# Patient Record
Sex: FEMALE | Race: WHITE | NOT HISPANIC OR LATINO | URBAN - METROPOLITAN AREA
[De-identification: names, ages, dates, MRNs, and addresses within clinical notes are randomized per-mention and may not be internally consistent; named-entity substitution may affect disease eponyms.]

---

## 2018-12-19 ENCOUNTER — INPATIENT (INPATIENT)
Facility: HOSPITAL | Age: 78
LOS: 7 days | DRG: 216 | End: 2018-12-27
Attending: THORACIC SURGERY (CARDIOTHORACIC VASCULAR SURGERY) | Admitting: THORACIC SURGERY (CARDIOTHORACIC VASCULAR SURGERY)
Payer: MEDICARE

## 2018-12-19 VITALS
DIASTOLIC BLOOD PRESSURE: 61 MMHG | TEMPERATURE: 100 F | HEART RATE: 110 BPM | RESPIRATION RATE: 16 BRPM | SYSTOLIC BLOOD PRESSURE: 83 MMHG

## 2018-12-19 DIAGNOSIS — J18.9 PNEUMONIA, UNSPECIFIED ORGANISM: ICD-10-CM

## 2018-12-19 DIAGNOSIS — K72.00 ACUTE AND SUBACUTE HEPATIC FAILURE WITHOUT COMA: ICD-10-CM

## 2018-12-19 DIAGNOSIS — E87.2 ACIDOSIS: ICD-10-CM

## 2018-12-19 DIAGNOSIS — J90 PLEURAL EFFUSION, NOT ELSEWHERE CLASSIFIED: ICD-10-CM

## 2018-12-19 DIAGNOSIS — J96.00 ACUTE RESPIRATORY FAILURE, UNSPECIFIED WHETHER WITH HYPOXIA OR HYPERCAPNIA: ICD-10-CM

## 2018-12-19 DIAGNOSIS — R04.2 HEMOPTYSIS: ICD-10-CM

## 2018-12-19 DIAGNOSIS — I51.1 RUPTURE OF CHORDAE TENDINEAE, NOT ELSEWHERE CLASSIFIED: ICD-10-CM

## 2018-12-19 DIAGNOSIS — N17.0 ACUTE KIDNEY FAILURE WITH TUBULAR NECROSIS: ICD-10-CM

## 2018-12-19 DIAGNOSIS — B44.9 ASPERGILLOSIS, UNSPECIFIED: ICD-10-CM

## 2018-12-19 DIAGNOSIS — I23.0 HEMOPERICARDIUM AS CURRENT COMPLICATION FOLLOWING ACUTE MYOCARDIAL INFARCTION: ICD-10-CM

## 2018-12-19 DIAGNOSIS — I23.5: ICD-10-CM

## 2018-12-19 LAB
ALBUMIN SERPL ELPH-MCNC: 2.7 G/DL — LOW (ref 3.3–5)
ALP SERPL-CCNC: 80 U/L — SIGNIFICANT CHANGE UP (ref 40–120)
ALT FLD-CCNC: 33 U/L — SIGNIFICANT CHANGE UP (ref 10–45)
ANION GAP SERPL CALC-SCNC: 15 MMOL/L — SIGNIFICANT CHANGE UP (ref 5–17)
APTT BLD: 32.1 SEC — SIGNIFICANT CHANGE UP (ref 27.5–36.3)
AST SERPL-CCNC: 78 U/L — HIGH (ref 10–40)
BASOPHILS NFR BLD AUTO: 0.1 % — SIGNIFICANT CHANGE UP (ref 0–2)
BILIRUB SERPL-MCNC: 0.9 MG/DL — SIGNIFICANT CHANGE UP (ref 0.2–1.2)
BLD GP AB SCN SERPL QL: NEGATIVE — SIGNIFICANT CHANGE UP
BLD GP AB SCN SERPL QL: NEGATIVE — SIGNIFICANT CHANGE UP
BUN SERPL-MCNC: 24 MG/DL — HIGH (ref 7–23)
CALCIUM SERPL-MCNC: 7.5 MG/DL — LOW (ref 8.4–10.5)
CHLORIDE SERPL-SCNC: 103 MMOL/L — SIGNIFICANT CHANGE UP (ref 96–108)
CHOLEST SERPL-MCNC: 106 MG/DL — SIGNIFICANT CHANGE UP (ref 10–199)
CK MB CFR SERPL CALC: 12.2 NG/ML — HIGH (ref 0–6.7)
CK MB CFR SERPL CALC: 17.8 NG/ML — HIGH (ref 0–6.7)
CK SERPL-CCNC: 559 U/L — HIGH (ref 25–170)
CK SERPL-CCNC: 724 U/L — HIGH (ref 25–170)
CO2 SERPL-SCNC: 20 MMOL/L — LOW (ref 22–31)
CREAT SERPL-MCNC: 1.2 MG/DL — SIGNIFICANT CHANGE UP (ref 0.5–1.3)
EOSINOPHIL NFR BLD AUTO: 0.1 % — SIGNIFICANT CHANGE UP (ref 0–6)
GLUCOSE SERPL-MCNC: 115 MG/DL — HIGH (ref 70–99)
HBA1C BLD-MCNC: 5 % — SIGNIFICANT CHANGE UP (ref 4–5.6)
HCT VFR BLD CALC: 31.2 % — LOW (ref 34.5–45)
HDLC SERPL-MCNC: 40 MG/DL — LOW
HGB BLD-MCNC: 10.2 G/DL — LOW (ref 11.5–15.5)
INR BLD: 1.32 — HIGH (ref 0.88–1.16)
LIPID PNL WITH DIRECT LDL SERPL: 54 MG/DL — SIGNIFICANT CHANGE UP
LYMPHOCYTES # BLD AUTO: 6.4 % — LOW (ref 13–44)
MAGNESIUM SERPL-MCNC: 2 MG/DL — SIGNIFICANT CHANGE UP (ref 1.6–2.6)
MCHC RBC-ENTMCNC: 31.5 PG — SIGNIFICANT CHANGE UP (ref 27–34)
MCHC RBC-ENTMCNC: 32.7 G/DL — SIGNIFICANT CHANGE UP (ref 32–36)
MCV RBC AUTO: 96.3 FL — SIGNIFICANT CHANGE UP (ref 80–100)
MONOCYTES NFR BLD AUTO: 13 % — SIGNIFICANT CHANGE UP (ref 2–14)
NEUTROPHILS NFR BLD AUTO: 80.4 % — HIGH (ref 43–77)
PLATELET # BLD AUTO: 143 K/UL — LOW (ref 150–400)
POTASSIUM SERPL-MCNC: 3.7 MMOL/L — SIGNIFICANT CHANGE UP (ref 3.5–5.3)
POTASSIUM SERPL-SCNC: 3.7 MMOL/L — SIGNIFICANT CHANGE UP (ref 3.5–5.3)
PROT SERPL-MCNC: 5.1 G/DL — LOW (ref 6–8.3)
PROTHROM AB SERPL-ACNC: 15.1 SEC — HIGH (ref 10–12.9)
RBC # BLD: 3.24 M/UL — LOW (ref 3.8–5.2)
RBC # FLD: 13.2 % — SIGNIFICANT CHANGE UP (ref 10.3–16.9)
RH IG SCN BLD-IMP: POSITIVE — SIGNIFICANT CHANGE UP
RH IG SCN BLD-IMP: POSITIVE — SIGNIFICANT CHANGE UP
SODIUM SERPL-SCNC: 138 MMOL/L — SIGNIFICANT CHANGE UP (ref 135–145)
TOTAL CHOLESTEROL/HDL RATIO MEASUREMENT: 2.6 RATIO — LOW (ref 3.3–7.1)
TRIGL SERPL-MCNC: 58 MG/DL — SIGNIFICANT CHANGE UP (ref 10–149)
TROPONIN T SERPL-MCNC: 2.42 NG/ML — CRITICAL HIGH (ref 0–0.01)
TROPONIN T SERPL-MCNC: 2.67 NG/ML — CRITICAL HIGH (ref 0–0.01)
TSH SERPL-MCNC: 0.56 UIU/ML — SIGNIFICANT CHANGE UP (ref 0.35–4.94)
WBC # BLD: 14.4 K/UL — HIGH (ref 3.8–10.5)
WBC # FLD AUTO: 14.4 K/UL — HIGH (ref 3.8–10.5)

## 2018-12-19 PROCEDURE — 93010 ELECTROCARDIOGRAM REPORT: CPT

## 2018-12-19 PROCEDURE — 99291 CRITICAL CARE FIRST HOUR: CPT

## 2018-12-19 PROCEDURE — 93306 TTE W/DOPPLER COMPLETE: CPT | Mod: 26

## 2018-12-19 PROCEDURE — 71045 X-RAY EXAM CHEST 1 VIEW: CPT | Mod: 26

## 2018-12-19 PROCEDURE — 36556 INSERT NON-TUNNEL CV CATH: CPT

## 2018-12-19 PROCEDURE — 99222 1ST HOSP IP/OBS MODERATE 55: CPT | Mod: GC

## 2018-12-19 PROCEDURE — 99223 1ST HOSP IP/OBS HIGH 75: CPT | Mod: 24

## 2018-12-19 RX ORDER — BUDESONIDE AND FORMOTEROL FUMARATE DIHYDRATE 160; 4.5 UG/1; UG/1
2 AEROSOL RESPIRATORY (INHALATION)
Qty: 0 | Refills: 0 | Status: DISCONTINUED | OUTPATIENT
Start: 2018-12-19 | End: 2018-12-24

## 2018-12-19 RX ORDER — HEPARIN SODIUM 5000 [USP'U]/ML
1000 INJECTION INTRAVENOUS; SUBCUTANEOUS
Qty: 25000 | Refills: 0 | Status: DISCONTINUED | OUTPATIENT
Start: 2018-12-19 | End: 2018-12-20

## 2018-12-19 RX ORDER — SODIUM CHLORIDE 9 MG/ML
500 INJECTION INTRAMUSCULAR; INTRAVENOUS; SUBCUTANEOUS ONCE
Qty: 0 | Refills: 0 | Status: COMPLETED | OUTPATIENT
Start: 2018-12-19 | End: 2018-12-19

## 2018-12-19 RX ORDER — ONDANSETRON 8 MG/1
1 TABLET, FILM COATED ORAL
Qty: 0 | Refills: 0 | COMMUNITY

## 2018-12-19 RX ORDER — VANCOMYCIN HCL 1 G
1000 VIAL (EA) INTRAVENOUS EVERY 24 HOURS
Qty: 0 | Refills: 0 | Status: DISCONTINUED | OUTPATIENT
Start: 2018-12-19 | End: 2018-12-27

## 2018-12-19 RX ORDER — FLECAINIDE ACETATE 50 MG
1 TABLET ORAL
Qty: 0 | Refills: 0 | COMMUNITY

## 2018-12-19 RX ORDER — MONTELUKAST 4 MG/1
1 TABLET, CHEWABLE ORAL
Qty: 0 | Refills: 0 | COMMUNITY

## 2018-12-19 RX ORDER — LOSARTAN POTASSIUM 100 MG/1
1 TABLET, FILM COATED ORAL
Qty: 0 | Refills: 0 | COMMUNITY

## 2018-12-19 RX ORDER — SODIUM CHLORIDE 9 MG/ML
1000 INJECTION INTRAMUSCULAR; INTRAVENOUS; SUBCUTANEOUS
Qty: 0 | Refills: 0 | Status: DISCONTINUED | OUTPATIENT
Start: 2018-12-19 | End: 2018-12-20

## 2018-12-19 RX ORDER — PANTOPRAZOLE SODIUM 20 MG/1
1 TABLET, DELAYED RELEASE ORAL
Qty: 0 | Refills: 0 | COMMUNITY

## 2018-12-19 RX ORDER — CLOPIDOGREL BISULFATE 75 MG/1
75 TABLET, FILM COATED ORAL DAILY
Qty: 0 | Refills: 0 | Status: DISCONTINUED | OUTPATIENT
Start: 2018-12-20 | End: 2018-12-20

## 2018-12-19 RX ORDER — PIPERACILLIN AND TAZOBACTAM 4; .5 G/20ML; G/20ML
2.25 INJECTION, POWDER, LYOPHILIZED, FOR SOLUTION INTRAVENOUS EVERY 6 HOURS
Qty: 0 | Refills: 0 | Status: DISCONTINUED | OUTPATIENT
Start: 2018-12-19 | End: 2018-12-20

## 2018-12-19 RX ORDER — MONTELUKAST 4 MG/1
10 TABLET, CHEWABLE ORAL DAILY
Qty: 0 | Refills: 0 | Status: DISCONTINUED | OUTPATIENT
Start: 2018-12-19 | End: 2018-12-22

## 2018-12-19 RX ORDER — IPRATROPIUM/ALBUTEROL SULFATE 18-103MCG
3 AEROSOL WITH ADAPTER (GRAM) INHALATION EVERY 6 HOURS
Qty: 0 | Refills: 0 | Status: DISCONTINUED | OUTPATIENT
Start: 2018-12-19 | End: 2018-12-19

## 2018-12-19 RX ORDER — ATORVASTATIN CALCIUM 80 MG/1
80 TABLET, FILM COATED ORAL AT BEDTIME
Qty: 0 | Refills: 0 | Status: DISCONTINUED | OUTPATIENT
Start: 2018-12-19 | End: 2018-12-25

## 2018-12-19 RX ORDER — VORICONAZOLE 10 MG/ML
1 INJECTION, POWDER, LYOPHILIZED, FOR SOLUTION INTRAVENOUS
Qty: 0 | Refills: 0 | COMMUNITY

## 2018-12-19 RX ORDER — NEBIVOLOL HYDROCHLORIDE 5 MG/1
1 TABLET ORAL
Qty: 0 | Refills: 0 | COMMUNITY

## 2018-12-19 RX ORDER — SIMVASTATIN 20 MG/1
1 TABLET, FILM COATED ORAL
Qty: 0 | Refills: 0 | COMMUNITY

## 2018-12-19 RX ORDER — ASPIRIN/CALCIUM CARB/MAGNESIUM 324 MG
81 TABLET ORAL DAILY
Qty: 0 | Refills: 0 | Status: DISCONTINUED | OUTPATIENT
Start: 2018-12-20 | End: 2018-12-20

## 2018-12-19 RX ORDER — POTASSIUM CHLORIDE 20 MEQ
40 PACKET (EA) ORAL ONCE
Qty: 0 | Refills: 0 | Status: COMPLETED | OUTPATIENT
Start: 2018-12-19 | End: 2018-12-19

## 2018-12-19 RX ORDER — TIOTROPIUM BROMIDE 18 UG/1
1 CAPSULE ORAL; RESPIRATORY (INHALATION) DAILY
Qty: 0 | Refills: 0 | Status: DISCONTINUED | OUTPATIENT
Start: 2018-12-19 | End: 2018-12-19

## 2018-12-19 RX ORDER — PANTOPRAZOLE SODIUM 20 MG/1
40 TABLET, DELAYED RELEASE ORAL
Qty: 0 | Refills: 0 | Status: DISCONTINUED | OUTPATIENT
Start: 2018-12-19 | End: 2018-12-20

## 2018-12-19 RX ORDER — IPRATROPIUM/ALBUTEROL SULFATE 18-103MCG
3 AEROSOL WITH ADAPTER (GRAM) INHALATION EVERY 6 HOURS
Qty: 0 | Refills: 0 | Status: DISCONTINUED | OUTPATIENT
Start: 2018-12-19 | End: 2018-12-27

## 2018-12-19 RX ORDER — ALBUTEROL 90 UG/1
1 AEROSOL, METERED ORAL EVERY 4 HOURS
Qty: 0 | Refills: 0 | Status: DISCONTINUED | OUTPATIENT
Start: 2018-12-19 | End: 2018-12-19

## 2018-12-19 RX ORDER — ALENDRONATE SODIUM 70 MG/1
1 TABLET ORAL
Qty: 0 | Refills: 0 | COMMUNITY

## 2018-12-19 RX ORDER — FLUTICASONE FUROATE AND VILANTEROL TRIFENATATE 100; 25 UG/1; UG/1
1 POWDER RESPIRATORY (INHALATION)
Qty: 0 | Refills: 0 | COMMUNITY

## 2018-12-19 RX ADMIN — Medication 40 MILLIEQUIVALENT(S): at 17:56

## 2018-12-19 RX ADMIN — HEPARIN SODIUM 10 UNIT(S)/HR: 5000 INJECTION INTRAVENOUS; SUBCUTANEOUS at 22:09

## 2018-12-19 RX ADMIN — SODIUM CHLORIDE 2000 MILLILITER(S): 9 INJECTION INTRAMUSCULAR; INTRAVENOUS; SUBCUTANEOUS at 16:05

## 2018-12-19 RX ADMIN — Medication 250 MILLIGRAM(S): at 21:42

## 2018-12-19 RX ADMIN — SODIUM CHLORIDE 125 MILLILITER(S): 9 INJECTION INTRAMUSCULAR; INTRAVENOUS; SUBCUTANEOUS at 17:16

## 2018-12-19 RX ADMIN — ATORVASTATIN CALCIUM 80 MILLIGRAM(S): 80 TABLET, FILM COATED ORAL at 21:42

## 2018-12-19 RX ADMIN — PIPERACILLIN AND TAZOBACTAM 200 GRAM(S): 4; .5 INJECTION, POWDER, LYOPHILIZED, FOR SOLUTION INTRAVENOUS at 21:42

## 2018-12-19 NOTE — H&P ADULT - ASSESSMENT
77 yo F w/ HTN, asthma, necrotizing PNA s/p biopsy showing aspergilloma transferred from [place] to [facility] CCU for NSTEMI for     cardiac cath.    Cardiovascular    #NSTEMI  pt had no CP, but had fever, nausea, vomiting, trops I 50, no ekg changes  -consider cardiac cath  -ASA, anti-platelets  -lipitor  -BB  -cbc, bmp, trops, tsh, lipid panel, a1c    #afib  paroxysmal, new onset, chadsvasc score 4  -tele monitoring  -c/w hep gtt    #HTN  -c/w     #HLD  -c/w statin    Pulmonary    #necrotizing PNA  s/p BAL, bx grew aspergillis, originally on amphoteracin c/b HOLA, swithced to voriconazole w/ imrpovement in cr  -c/w voriconazole  -ID consult  -pulm consult Dr. Saucedo  -consider vats  -hikadin for cough (to suppress cough)    #asthma  -duonebs q4 prn    #FENC/ misc  F- PO hydration  E- replete as needed  N- dash diet  C- FC  DVt ppx: hep drip  A as tolerated  dispo: CCU    #JULIO CESAR  Dr. Madeline Smith- [phone] 79 yo F w/ HTN, asthma, necrotizing PNA s/p biopsy showing aspergilloma transferred from [place] to [facility] CCU for NSTEMI for     cardiac cath.    Cardiovascular    #NSTEMI  pt had no CP, but had fever, nausea, vomiting, trops I 50, no ekg changes, transferred to [facility] for NSTEMI  -cardiac cath 12/20  -c/w ASA  -c/w plavix  -c/w lipitor 80  -BB  -cbc, bmp, trops, tsh, lipid panel, a1c    #afib  paroxysmal, new onset, chadsvasc score 4  -tele monitoring  -c/w hep gtt    #HTN  hx of hypertension, now norm/hypo-tensive  -hold anti-hypertensives in setting of normo-hypotension    #HLD  -c/w statin as above    Pulmonary    #necrotizing PNA  s/p BAL, bx grew aspergillis, originally on amphoteracin c/b HOLA, swithced to voriconazole w/ imrpovement in cr  -c/w voriconazole  -ID consult  -pulm consult Dr. Saucedo, f/u recs  -consider vats  -hikadin for cough (to suppress cough)    #asthma  -duonebs q4 prn    #FENC/ misc  F- PO hydration  E- replete as needed  N- dash diet  C- FC  DVt ppx: hep drip  A as tolerated  dispo: CCU    #JULIO CESAR  Dr. Madeline Smith- [phone] 79 yo F w/ HTN, asthma, necrotizing PNA s/p biopsy showing aspergilloma transferred from [place] to [facility] CCU for NSTEMI for     cardiac cath.    Cardiovascular    #NSTEMI  pt had no CP, but had fever, nausea, vomiting, trops I 50, no ekg changes, transferred to [facility] for NSTEMI  -cardiac cath 12/20  -c/w ASA  -c/w plavix  -c/w lipitor 80  -BB  -cbc, bmp, trops, tsh, lipid panel, a1c    #afib  paroxysmal, new onset, chadsvasc score 4  -tele monitoring  -c/w hep gtt    #HTN  hx of hypertension, now norm/hypo-tensive  -hold anti-hypertensives in setting of normo-hypotension    #HLD  -c/w statin as above    Pulmonary    #necrotizing PNA  s/p BAL, bx grew aspergillis, originally on amphoteracin c/b HOLA, swithced to voriconazole w/ imrpovement in cr  -c/w voriconazole  -ID consult  -fungitell, aspergillis galactomannan antigen  -pulm consult Dr. Saucedo, f/u recs  -consider vats  -hikadin for cough (to suppress cough)    #asthma  -duonebs q4 prn    #FENC/ misc  F- PO hydration  E- replete as needed  N- dash diet  C- FC  DVt ppx: hep drip  A as tolerated  dispo: CCU    #JULIO CESAR  Dr. Madeline Smith- [phone]  Pulmonologist- Dr. Escobar  Cardiologist- Dr. Taylor 77 yo F w/ HTN, asthma, necrotizing PNA s/p biopsy showing aspergilloma transferred from [place] to [facility] CCU for NSTEMI for     cardiac cath.    Cardiovascular    #NSTEMI  pt had no CP, but had fever, nausea, vomiting, trops I 50, no ekg changes, transferred to [facility] for NSTEMI  -cardiac cath 12/20  -c/w ASA  -c/w plavix  -c/w lipitor 80  -hold BB in setting of sepsis  -cbc, bmp, trops, tsh, lipid panel, a1c    #afib  paroxysmal, new onset, chadsvasc score 4  -tele monitoring  -c/w hep gtt    #HTN  hx of hypertension, now norm/hypo-tensive  -hold anti-hypertensives in setting of normo-hypotension    #HLD  -c/w statin as above    ID    #sepsis  POA, WBC elevated and tachycardia, source is likely lung infection. BCx ngtd at [place]  -ID consult  -hold BB, and losartan in setting of sepsis  -monitor CBC  -f/u Bcx from [facility] ([facility])    Pulmonary    #necrotizing PNA  s/p BAL, bx grew aspergillis, originally on amphoteracin c/b HOLA, swithced to voriconazole w/ imrpovement in cr  -ID consulted  -fungitell, aspergillis galactomannan antigen  -pulm consult Dr. Saucedo, f/u recs  -hycodan PRN for cough    #asthma  on home albuterol and brep-ellipta   -duonebs q4 prn  -symbicort 160 BID  (interchange for home breo-ellipta)    #FENC/ misc  F- IVF bolus now, then PO hydration  E- replete as needed  N- dash diet, npo at 12  C- FC  DVt ppx: hep drip  A as tolerated  dispo: CCU    #JULIO CESAR  Dr. Madeline Smith- [phone]  Pulmonologist- Dr. Escobar  Cardiologist- Dr. Taylor 77 yo F w/ HTN, asthma, necrotizing PNA s/p biopsy showing aspergilloma transferred from [place] to [facility] CCU for NSTEMI for     cardiac cath.    Cardiovascular    #NSTEMI  pt had no CP, but had fever, nausea, vomiting, trops I 50, no ekg changes, transferred to [facility] for NSTEMI  -cardiac cath 12/20  -npo after 12am  -c/w ASA  -c/w plavix  -c/w lipitor 80  -hold BB in setting of sepsis  -trops, tsh, lipid panel, a1c    #afib  paroxysmal, new onset, chadsvasc score 4  -tele monitoring  -c/w hep gtt    #HTN  hx of hypertension, now norm/hypo-tensive  -hold anti-hypertensives in setting of normo-hypotension    #HLD  -c/w statin as above    ID    #sepsis  POA, WBC elevated and tachycardia, source is likely lung infection. BCx ngtd at [place]. UA neg/ no urinary sx. lactate 1.6 at [place]  -ID consulted, f.u recs  -hold BB, and losartan in setting of sepsis and hypo-normotension  -monitor CBC, fever  -f/u Bcx from [facility] ([facility])    Pulmonary    #necrotizing PNA  CXR 10/31 showing RUL large opacity/consolidation. 12/13 CT chest showing focal consolidation in the right upper/middle lobes with adjacent tree-in-bud nodularity as well as mild bronchiectatic changes, suspicious for    a low-grade necrotizing pneumonia, a few mildly prominent low density lymph nodes measuring up to 7 mm. pt s/p Bronchoscopy w/ BAL and biopsy 10/31 neg for AFB. Repeat bronch at [facility] 1 week ago, unknown results at this time. pt was treated for aspergillosis at [place] w/ amphotericin c/b HOLA, switched to voriconazole w/ improvement in cr.  -call for collateral in am about repeat BAL results  -ID consulted  -fungitell, aspergillis galactomannan antigen  -pulm consult Dr. Saucedo, f/u recs  -hycodan PRN for cough  -no abx at this time as no culture/BAL data and aspergilloma not treated w/ po meds but with resection per ID    #asthma  on home albuterol and brep-ellipta   -duonebs q4 prn  -symbicort 160 BID  (interchange for home breo-ellipta)    #FENC/ misc  F- IVF bolus now, then PO hydration  E- replete as needed  N- dash diet, npo at 12  C- FC  DVt ppx: hep drip  A as tolerated  dispo: CCU    #JULIO CESAR  Dr. Madeline Smith- [phone]  Pulmonologist- Dr. Escobar  Cardiologist- Dr. Taylor 77 yo F w/ HTN, asthma, necrotizing PNA s/p biopsy showing aspergilloma transferred from [place] to [facility] CCU for NSTEMI for     cardiac cath.    Cardiovascular    #NSTEMI  pt had no CP, but had fever, nausea, vomiting, trops I 50, no ekg changes, transferred to [facility] for NSTEMI. trops T at [facility] ~2. a1c 5. ldl 54.  -cardiac cath 12/20  -npo after 12am  -c/w ASA  -c/w plavix  -c/w lipitor 80  -hold BB in setting of sepsis  - tsh  -trend trops to peak    #afib  paroxysmal, new onset, chadsvasc score 4  -tele monitoring  -c/w hep gtt    #HTN  hx of hypertension, now norm/hypo-tensive  -hold anti-hypertensives in setting of normo-hypotension    #HLD  -c/w statin as above    ID    #sepsis  POA, WBC elevated and tachycardia, source is likely lung infection. BCx ngtd at [place]. UA neg/ no urinary sx. lactate 1.6 at [place]  -ID consulted, f.u recs  -hold BB, and losartan in setting of sepsis and hypo-normotension  -monitor CBC, fever  -f/u Bcx from [facility] ([facility]  -see below under nec pna    #leukocytosis  likely 2/2 sepsis from invasive aspergillosis vs HCAP vs reactive from NSTEMI  -monitor CBC    Pulmonary    #necrotizing PNA  CXR 10/31 showing RUL large opacity/consolidation. 12/13 CT chest showing focal consolidation in the right upper/middle lobes with adjacent tree-in-bud nodularity as well as mild bronchiectatic changes, suspicious for    a low-grade necrotizing pneumonia, a few mildly prominent low density lymph nodes measuring up to 7 mm. pt s/p Bronchoscopy w/ BAL and biopsy 10/31 neg for AFB. Repeat bronch at [facility] 1 week ago, showed aspergillis, given hemoptysis pt has invasive aspergillosis. pt was treated for aspergillosis at [place] w/ amphotericin b (while flecanide cleared system) c/b HOLA, switched to voriconazole w/ improvement in cr.  -call for collateral in am about repeat BAL results  -ID consulted  -f/u fungitell, aspergillis galactomannan antigen  -pulm consult Dr. Saucedo, f/u recs  -hycodan PRN for cough  -empiric tx w/ vanc zosyn per pulm for HCAP  -hold flecainide as interacts w/ voriconazole    #asthma  on home albuterol and brep-ellipta   -duonebs q4 prn  -symbicort 160 BID  (interchange for home breo-ellipta)    Heme    #anemia  normocytic, no known baseline  -monitor CBC  -type and Screen    #thrombocytopenia  -monitor CBC    Other    #FENC/ misc  F- IVF bolus now, then PO hydration  E- replete as needed  N- dash diet, npo at 12  C- FC  DVt ppx: hep drip  A as tolerated  dispo: CCU    #JULIO CESAR  Dr. Madeline Smith- [phone]  Pulmonologist- Dr. Escobra  Cardiologist- Dr. Taylor 79 yo F w/ HTN, asthma, necrotizing PNA s/p biopsy showing aspergilloma transferred from [place] to [facility] CCU for NSTEMI for     cardiac cath.    Cardiovascular    #NSTEMI  pt had no CP, but had fever, nausea, vomiting, trops I 50, no ekg changes, transferred to [facility] for NSTEMI. trops T at [facility] ~2. a1c 5. ldl 54.  -cardiac cath 12/20  -npo after 12am  -c/w ASA  -c/w plavix  -c/w lipitor 80  -hold BB in setting of sepsis  - tsh  -trend trops to peak    #afib  paroxysmal, new onset, chadsvasc score 4  -tele monitoring  -c/w hep gtt    #HTN  hx of hypertension, now norm/hypo-tensive  -hold anti-hypertensives in setting of normo-hypotension    #HLD  -c/w statin as above    ID    #sepsis  POA, WBC elevated and tachycardia, source is likely lung infection. BCx ngtd at [place]. UA neg/ no urinary sx. lactate 1.6 at [place]  -ID consulted, f.u recs  -hold BB, and losartan in setting of sepsis and hypo-normotension  -monitor CBC, fever  -f/u Bcx from [facility] ([facility])  -see below under nec pna    #leukocytosis  likely 2/2 sepsis from invasive aspergillosis vs HCAP vs reactive from NSTEMI  -monitor CBC    Pulmonary    #necrotizing PNA  CXR 10/31 showing RUL large opacity/consolidation. 12/13 CT chest showing focal consolidation in the right upper/middle lobes with adjacent tree-in-bud nodularity as well as mild bronchiectatic changes, suspicious for    a low-grade necrotizing pneumonia, a few mildly prominent low density lymph nodes measuring up to 7 mm. pt s/p Bronchoscopy w/ BAL and biopsy 10/31 neg for AFB. Repeat bronch at [facility] 1 week ago, showed aspergillis, given hemoptysis pt has invasive aspergillosis. pt was treated for aspergillosis at [place] w/ amphotericin b (while flecanide cleared system) c/b HOLA, switched to voriconazole w/ improvement in cr.  -call for collateral in am about repeat BAL results  -ID consulted  -f/u fungitell, aspergillis galactomannan antigen  -pulm consult Dr. Saucedo, f/u recs  -hycodan PRN for cough  -empiric tx w/ vanc zosyn per pulm for HCAP  -hold flecainide as interacts w/ voriconazole    #asthma  on home albuterol and brep-ellipta   -duonebs q4 prn  -symbicort 160 BID  (interchange for home breo-ellipta)    Heme    #anemia  normocytic, 14 on admission to [place], pt s/p hemoptysis now resolved  -monitor CBC  -monitor for s/s bleed  -active type and Screen    #thrombocytopenia  baseline ~200 at [place] admission now 143, likely from acute illness causing myelosuppresion  -monitor CBC    Other    #FENC/ misc  F- IVF bolus now, then PO hydration  E- replete as needed  N- dash diet, npo at 12  C- FC  DVt ppx: hep drip  A as tolerated  dispo: CCU    #JULIO CESAR  Dr. Madeline Smith- [phone]  Pulmonologist- Dr. Escobar  Cardiologist- Dr. Taylor 79 yo F w/ HTN, asthma, HLD, GERD, SVT, MVP, osteoporosis, lung nodule (RUL, for 2 years), necrotizing PNA s/p biopsy showing aspergillosis transferred from [facility] w/ NSTEMI for cardiac cath.     Cardiovascular    #NSTEMI  pt had no CP, but had fever, nausea, vomiting, trops I 50, no ekg changes, transferred to [facility] for NSTEMI. trops T at [facility] ~2. a1c 5. ldl 54.  -cardiac cath 12/20  -npo after 12am  -c/w ASA  -c/w plavix  -c/w lipitor 80  -hold BB in setting of sepsis  - tsh  -trend trops to peak    #afib  paroxysmal, new onset, chadsvasc score 4  -tele monitoring  -c/w hep gtt    #HTN  hx of hypertension, now norm/hypo-tensive  -hold anti-hypertensives in setting of normo-hypotension    #HLD  -c/w statin as above    ID    #sepsis  POA, WBC elevated and tachycardia, source is likely lung infection. BCx ngtd, lactate 1.6 at [facility]. UA neg/ no urinary sx.   -ID consulted, f.u recs  -hold BB, and losartan in setting of sepsis and hypo-normotension  -monitor CBC, fever  -f/u Bcx from [facility] ([facility])  -see below under nec pna    #leukocytosis  likely 2/2 sepsis from invasive aspergillosis vs HCAP vs reactive from NSTEMI  -monitor CBC    Pulmonary    #necrotizing PNA  CXR 10/31 showing RUL large opacity/consolidation. 12/13 CT chest showing focal consolidation in the right upper/middle lobes with adjacent tree-in-bud nodularity as well as mild bronchiectatic changes, suspicious for    a low-grade necrotizing pneumonia, a few mildly prominent low density lymph nodes measuring up to 7 mm. pt s/p Bronchoscopy w/ BAL and biopsy 10/31 neg for AFB. Repeat bronch at [facility] 1 week ago, showed aspergillis, given hemoptysis pt has invasive aspergillosis. pt was treated for aspergillosis [facility] w/ amphotericin b (while flecanide cleared system) c/b HOLA, switched to voriconazole w/ improvement in cr.  -call for collateral in am about repeat BAL results  -ID consulted  -f/u fungitell, aspergillis galactomannan antigen  -pulm consult Dr. Saucedo, f/u recs  -hycodan PRN for cough  -empiric tx w/ vanc zosyn per pulm for HCAP  -hold flecainide as interacts w/ voriconazole    #asthma  on home albuterol and brep-ellipta   -duonebs q4 prn  -symbicort 160 BID  (interchange for home breo-ellipta)    Heme    #anemia  normocytic, 14 on admission to [facility], pt s/p hemoptysis now resolved  -monitor CBC  -monitor for s/s bleed  -active type and Screen    #thrombocytopenia  baseline ~200 at [facility] admission now 143, likely from acute illness causing myelosuppression  -monitor CBC    Other    #FENC/ misc  F- IVF bolus now, then PO hydration  E- replete as needed  N- dash diet, npo at 12  C- FC  DVt ppx: hep drip  A as tolerated  dispo: CCU    #JULIO CESAR  Dr. Madeline Smith- [phone]  Pulmonologist- Dr. Escobar  Cardiologist- Dr. Taylor 79 yo F w/ HTN, asthma, HLD, GERD, SVT, MVP, osteoporosis, lung nodule (RUL, for 2 years), necrotizing PNA s/p biopsy showing aspergillosis transferred from [facility] w/ NSTEMI for cardiac cath.     Cardiovascular    #NSTEMI  pt had no CP, but had fever, nausea, vomiting, trops I 50, no ekg changes, transferred to [facility] for NSTEMI. loaded w/ asa and plavix,  trops T at [facility] ~2. a1c 5. ldl 54.  -cardiac cath 12/20  -npo after 12am  -c/w ASA  -c/w plavix  -c/w lipitor 80  -hold BB in setting of sepsis  -f/u tsh  -trend trops to peak  -hep gtt     #afib  paroxysmal, s/p dilt 14mg IVPB x 1, new onset, chadsvasc score 4  -tele monitoring  -c/w hep gtt    #HTN  hx of hypertension, now norm/hypo-tensive  -hold anti-hypertensives in setting of normo-hypotension    #HLD  -c/w statin as above    ID    #sepsis  POA, WBC elevated and tachycardia, source is likely lung infection. BCx ngtd, lactate 1.6 at [facility]. UA neg/ no urinary sx.   -ID consulted, f.u recs  -hold BB, and losartan in setting of sepsis and hypo-normotension  -monitor CBC, fever  -f/u Bcx from [facility]  -see below under nec pna    #leukocytosis  likely 2/2 sepsis from invasive aspergillosis vs HCAP vs reactive from NSTEMI  -monitor CBC    Pulmonary    #necrotizing PNA  CXR 10/31 showing RUL large opacity/consolidation. bronch at [facility] 2 weeks ago, FNA cytopathology showed aspergillis.  12/13 CT chest showing focal consolidation in the right upper/middle lobes with adjacent tree-in-bud nodularity as well as mild bronchiectatic changes, suspicious foa low-grade necrotizing pneumonia, a few mildly prominent low density lymph nodes measuring up to 7 mm. given hemoptysis pt was treated for invasive aspergillosis, initially w/ amphotericin b (while flecainide cleared system) c/b HOLA, switched to voriconazole w/ improvement in cr.  -ID consulted, f/u recs  -f/u fungitell, aspergillis galactomannan antigen  -pulm consult Dr. Saucedo, f/u recs  -hycodan PRN for cough  -hold flecainide as interacts w/ voriconazole    #asthma  on home albuterol and breo-ellipta   -symbicort 160 BID  (interchange for home breo-ellipta)  -c/w singulair  -duonebs prn q4    Heme    #anemia  hb 10.2, normocytic, 14 on admission to [facility], pt s/p hemoptysis now resolved  -monitor CBC  -monitor for s/s bleed  -active type and Screen    #thrombocytopenia  baseline ~200 at [facility] admission now 143, likely from acute illness causing myelosuppression  -monitor CBC  -transfuse <10, or <50 w/ active bleeding    Other    #FENC/ misc  F- s/p 500cc bolus, IVF NS 125cc/hours for 4 hours  E- replete as needed  N- dash diet, npo at 12  C- Full Code  DVt ppx: hep drip  Activity as tolerated  dispo: CCU    #JULIO CESAR  Dr. Madeline Smith- [phone]  Pulmonologist- Dr. Escobar  Cardiologist- Dr. Taylor 79 yo F w/ HTN, asthma, HLD, GERD, SVT, MVP, osteoporosis, lung nodule (RUL, for 2 years), necrotizing PNA s/p biopsy showing aspergillosis transferred from [facility] w/ NSTEMI for cardiac cath.     Cardiovascular    #NSTEMI  pt had no CP, but had fever, nausea, vomiting, trops I 50, no ekg changes, transferred to [facility] for NSTEMI. loaded w/ asa and plavix,  trops T at [facility] ~2. a1c 5. ldl 54. tsh wnl. echo showed nl EF 60%, and nl except for atrial dilation b/l  -cardiac cath 12/20  -npo after 12am  -c/w ASA  -c/w plavix  -c/w lipitor 80  -hold BB in setting of sepsis  -trend trops to peak  -hep gtt     #afib  paroxysmal, s/p dilt 14mg IVPB x 1, new onset, chadsvasc score 4  -tele monitoring  -c/w hep gtt    #HTN  hx of hypertension, now norm/hypo-tensive  -hold anti-hypertensives in setting of normo-hypotension    #HLD  -c/w statin as above    ID    #sepsis  POA, WBC elevated and tachycardia, source is likely lung infection. BCx ngtd, lactate 1.6 at [facility]. UA neg/ no urinary sx.   -ID consulted, f.u recs  -hold BB, and losartan in setting of sepsis and hypo-normotension  -monitor CBC, fever  -f/u Bcx from [facility] ([facility])  -see below under nec pna    #leukocytosis  likely 2/2 sepsis from invasive aspergillosis vs HCAP vs reactive from NSTEMI  -monitor CBC    Pulmonary    #necrotizing PNA  CXR 10/31 showing RUL large opacity/consolidation. bronch at [facility] 2 weeks ago, FNA cytopathology showed aspergillis.  12/13 CT chest showing focal consolidation in the right upper/middle lobes with adjacent tree-in-bud nodularity as well as mild bronchiectatic changes, suspicious foa low-grade necrotizing pneumonia, a few mildly prominent low density lymph nodes measuring up to 7 mm. given hemoptysis pt was treated for invasive aspergillosis, initially w/ amphotericin b (while flecainide cleared system) c/b HOLA, switched to voriconazole w/ improvement in cr.  -ID consulted, f/u recs  -f/u fungitell, aspergillis galactomannan antigen  -pulm consult Dr. Saucedo, f/u recs  -hycodan PRN for cough  -hold flecainide as interacts w/ voriconazole    #asthma  on home albuterol and breo-ellipta   -symbicort 160 BID  (interchange for home breo-ellipta)  -c/w singulair  -duonebs prn q4    Heme    #anemia  hb 10.2, normocytic, 14 on admission to [facility], pt s/p hemoptysis now resolved  -monitor CBC  -monitor for s/s bleed  -active type and Screen    #thrombocytopenia  baseline ~200 at [facility] admission now 143, likely from acute illness causing myelosuppression  -monitor CBC  -transfuse <10, or <50 w/ active bleeding    Other    #FENC/ misc  F- s/p 500cc bolus, IVF NS 125cc/hours for 4 hours  E- replete as needed  N- dash diet, npo at 12  C- Full Code  DVt ppx: hep drip  Activity as tolerated  dispo: CCU    #JULIO CESAR  Dr. Madeline Smith- [phone]  Pulmonologist- Dr. Escobar  Cardiologist- Dr. Taylor

## 2018-12-19 NOTE — H&P ADULT - NSHPLABSRESULTS_GEN_ALL_CORE
.  LABS:                         RADIOLOGY, EKG & ADDITIONAL TESTS: Reviewed. .  LABS:                         10.2   14.4  )-----------( 143      ( 19 Dec 2018 17:10 )             31.2           PT/INR - ( 19 Dec 2018 17:09 )   PT: 15.1 sec;   INR: 1.32          PTT - ( 19 Dec 2018 17:09 )  PTT:32.1 sec              RADIOLOGY, EKG & ADDITIONAL TESTS: Reviewed. .  LABS:                         10.2   14.4  )-----------( 143      ( 19 Dec 2018 17:10 )             31.2     12-19    138  |  103  |  24<H>  ----------------------------<  115<H>  3.7   |  20<L>  |  1.20    Ca    7.5<L>      19 Dec 2018 17:09  Mg     2.0     12-19    TPro  5.1<L>  /  Alb  2.7<L>  /  TBili  0.9  /  DBili  x   /  AST  78<H>  /  ALT  33  /  AlkPhos  80  12-19    PT/INR - ( 19 Dec 2018 17:09 )   PT: 15.1 sec;   INR: 1.32          PTT - ( 19 Dec 2018 17:09 )  PTT:32.1 sec              RADIOLOGY, EKG & ADDITIONAL TESTS: Reviewed.

## 2018-12-19 NOTE — H&P ADULT - HISTORY OF PRESENT ILLNESS
77 yo F w/ HTN, asthma, HLD, necrotizing PNA s/p biopsy showing aspergilloma transferred from [place] w/ NSTEMI for cardiac cath. Pt had     cough w/ hemopytsis outpt, w/u revealed necrotizing PNA s/p biopsy showing aspergilloma. Started on amphoteracin c/b renal failure,     and n/v. Switched to Voriconazole, w/ improvment in Cr. Night of 12/18, pt spiked a fever to 102 with n/v. Bcx drawn, one set neg,     one pending. Trops I were 50, no EKG changes, no CP. Started on coreg, lipitor,lovenox, loaded w/ ASA, plavix. S/p zosyn for     empiric tx of fever, depsite voriconazole therapy. Defervesced, hemodynamicvally stable, 1 episode of paraoxysmal afib that self-    resolved, now in NSR. Hemopytsis resolved. Transferred to [facility] for cardiac cath.     On arrival, pt denied CP, SOB, fevers, chills, reports stable cough, no hemoptysis. Denies pain. 77 yo F w/ HTN, asthma, HLD, GERD, SVT, MVP, osteoporosis, lung nodule (RUL, for 2 years), necrotizing PNA s/p biopsy showing aspergilloma transferred from [place] w/ NSTEMI for cardiac cath. In October, pt had routine CT scan for monitoring of a lung nodule, that showed increase in size. She had a bronchoscopy 1 week ago at [facility] which revealed aspergilloma. She followed w/ her outpt pulmonologist 12/13 because was having hemoptysis and cough (filled 2 tissues with blood), who referred her to the ED. Pt went to [facility] for hemoptysis, pt w/ "moderate hemopysis" at [place]. CT chest noncon showing cavitary lesion concerning for necrotizing PNA. ID consulted at [place] Pt was started on amphotericin B, c/b HOLA, n/v so switched to voriconazole, w/ improvement    Cx from bronch  at [facility] neg for fungal and AFB    CT chest shows:     s/p BAL which revealed necrotizing PNA s/p biopsy showing aspergilloma. S   Night of 12/18, pt spiked a fever to 102 with n/v. Bcx drawn, one set neg,     one pending. Trops I were 50, no EKG changes, no CP. Started on coreg, lipitor,lovenox, loaded w/ ASA, plavix. S/p zosyn for     empiric tx of fever, despite voriconazole therapy. Defervesced, hemodynamicvally stable, 1 episode of paraoxysmal afib that self-    resolved, now in NSR. Hemopytsis resolved. Transferred to [facility] for cardiac cath.     Pt denied CP, SOB, fevers, chills, reports stable cough, no current hemoptysis. Denies pain, dysuria, urinary frequency change, diarrhea. 77 yo F w/ HTN, asthma, HLD, GERD, SVT, MVP, osteoporosis, lung nodule (RUL, for 2 years), necrotizing PNA s/p biopsy showing aspergilloma transferred from [place] w/ NSTEMI for cardiac cath. In October, pt had routine CXR  for monitoring of a RUL lung nodule, that showed increase in size, s/p bronchoscopy at that time w/ negative bx and cx. Pt had repeat bronchoscopy 1 week ago at [facility] which per [facility] revealed aspergillis infection. She followed up w/ her outpt pulmonologist 12/13 because she was having hemoptysis and cough (filled 2 tissues with blood), who referred her to the ED. Pt went to [facility] for hemoptysis, pt w/ "moderate hemopysis" at [facility]. CT chest noncon showing cavitary lesion concerning for necrotizing PNA. ID consulted at [place], Bcx neg, and pt was started on amphotericin B, c/b HOLA, n/v so switched to voriconazole, w/ improvement in Cr. On the night prior to transfer to [facility],, pt spiked fever of 102 with n/v. Bcx drawn, trops I were 50. Pt had no EKG changes, CP, or SOB. Started on coreg, lipitor, lovenox, loaded w/ ASA, plavix. S/p zosyn for fever. Pt defervesced, hemodynamically stable, 1 episode of paraoxysmal afib that self-resolved. Transferred to [facility] for cardiac cath in setting of NSTEMI.     On arrival, pt afebrile, tachycardic, with BP in 100s.  Pt denied CP, SOB, fevers, chills, reports stable cough, no current hemoptysis. Denies pain, dysuria, urinary frequency change, diarrhea. Pt denies cardiac history. 77 yo F w/ HTN, asthma, HLD, GERD, SVT, MVP, osteoporosis, lung nodule (RUL, for 2 years), necrotizing PNA s/p biopsy showing aspergillosis transferred from [place] w/ NSTEMI for cardiac cath. In october, pt had routine CXR for monitoring of a RUL lung nodule (that shes has had for two years), that showed increase in size. Pt s/p bronchoscopy 10/31 w/ negative biopsy and culture. Pt had repeat bronchoscopy 2 weeks ago at [facility] which revealed aspergillosis. She followed up w/ her outpt pulmonologist 12/13 because she was having hemoptysis and cough (filled 2 tissues with blood), who referred her to [place] ED. Pt admitted to [place], w/ "moderate hemopysis," and CT chest showing cavitary lesion concerning for necrotizing PNA. ID consulted at [place], Bcx neg, and pt was started on amphotericin B (bc voriconazole interacts w/ flecainide, so used while cleared her system) for invasive aspergillosis c/b HOLA, n/v. Switched to voriconazole when flecainide cleared her two days later, w/ improvement in Cr. On the night prior to transfer to [facility],, pt spiked fever of 102 with n/v. Bcx drawn, trops I were 50. Pt had no EKG changes, CP, or SOB. Started on coreg, lipitor, lovenox, loaded w/ ASA, plavix. S/p zosyn x1 am of admission for fever. Pt defervesced, hemodynamically stable, 1 episode of paroxysmal afib that resolved s/p diltiazem. Transferred to [facility] for cardiac cath in setting of NSTEMI. On arrival, pt afebrile, tachycardic, with BP in 100s.  Pt denied CP, SOB, fevers, chills, reports stable cough, no current hemoptysis. Denies pain, dysuria, urinary frequency change, diarrhea, cardiac history. 77 yo F w/ HTN, asthma, HLD, GERD, SVT, MVP, osteoporosis, lung nodule (RUL, for 2 years), necrotizing PNA s/p biopsy showing aspergillosis transferred from [facility] w/ NSTEMI for cardiac cath. In october, pt had routine CXR for monitoring of a RUL lung nodule (that shes has had for two years), that showed increase in size. Pt s/p bronchoscopy 10/31 w/ negative biopsy and culture. Pt had repeat bronchoscopy 2 weeks ago at [facility] which revealed aspergillosis. She followed up w/ her outpt pulmonologist 12/13 because she was having hemoptysis and cough (filled 2 tissues with blood), who referred her to [facility] ED. Pt admitted for w/ "moderate hemoptysis" and CT chest showing cavitary lesion concerning for necrotizing PNA. ID consulted, Bcx neg, and pt was started on amphotericin B (bc voriconazole interacts w/ flecainide, so used while cleared her system) for invasive aspergillosis c/b HOLA, n/v. Switched to voriconazole when flecainide cleared her two days later, w/ improvement in Cr. On the night prior to transfer to [facility],, pt spiked fever of 102 with n/v. Bcx drawn, trops I were 50. Pt had no EKG changes, CP, or SOB. Started on coreg, lipitor, lovenox, loaded w/ ASA, plavix. S/p zosyn x1 am of admission for fever. Pt defervesced, hemodynamically stable, 1 episode of paroxysmal afib that resolved s/p diltiazem. Transferred to [facility] for cardiac cath in setting of NSTEMI. On arrival, pt afebrile, tachycardic, with BP in 100s.  Pt denied CP, SOB, fevers, chills, reports stable cough, no current hemoptysis. Denies pain, dysuria, urinary frequency change, diarrhea, cardiac history.

## 2018-12-19 NOTE — CONSULT NOTE ADULT - SUBJECTIVE AND OBJECTIVE BOX
Surgeon: Dr. Christian    Requesting Physician: Dr. Olivier    HISTORY OF PRESENT ILLNESS: ***Incomplete note.  Pt to be seen tomorrow***  79 yo F w/ HTN, asthma, HLD, GERD, SVT, MVP, osteoporosis, lung nodule (RUL, for 2 years), necrotizing PNA s/p biopsy showing aspergillosis transferred from [facility] w/ NSTEMI for cardiac cath. In october, pt had routine CXR for monitoring of a RUL lung nodule (that shes has had for two years), that showed increase in size. Pt s/p bronchoscopy 10/31 w/ negative biopsy and culture. Pt had repeat bronchoscopy 2 weeks ago at [facility] which revealed aspergillosis. She followed up w/ her out pt pulmonologist 12/13 because she was having hemoptysis and cough (filled 2 tissues with blood), who referred her to [facility] ED. Pt admitted for w/ "moderate hemoptysis" and CT chest showing cavitary lesion concerning for necrotizing PNA. ID consulted, Bcx neg, and pt was started on amphotericin B (bc voriconazole interacts w/ flecainide, so used while cleared her system) for invasive aspergillosis c/b HOLA, n/v. Switched to voriconazole when flecainide cleared her two days later, w/ improvement in Cr. On the night prior to transfer to [facility],, pt spiked fever of 102 with n/v. Bcx drawn, trops I were 50. Pt had no EKG changes, CP, or SOB. Started on coreg, lipitor, lovenox, loaded w/ ASA, plavix. S/p zosyn x1 am of admission for fever. Pt defervesced, hemodynamically stable, 1 episode of paroxysmal afib that resolved s/p diltiazem. Transferred to [facility] for cardiac cath in setting of NSTEMI. On arrival, pt afebrile, tachycardic, with BP in 100s.  Pt denied CP, SOB, fevers, chills, reports stable cough, no current hemoptysis. Denies pain, dysuria, urinary frequency change, diarrhea, cardiac history.  Called to consult for the PNA/recent hemoptysis.      PAST MEDICAL & SURGICAL HISTORY:  Asthma, unspecified asthma severity, unspecified whether complicated, unspecified whether persistent  High cholesterol  Hypertension      MEDICATIONS  (STANDING):  atorvastatin 80 milliGRAM(s) Oral at bedtime  buDESOnide 160 MICROgram(s)/formoterol 4.5 MICROgram(s) Inhaler 2 Puff(s) Inhalation two times a day  heparin  Infusion 1000 Unit(s)/Hr (10 mL/Hr) IV Continuous <Continuous>  montelukast 10 milliGRAM(s) Oral daily  pantoprazole    Tablet 40 milliGRAM(s) Oral before breakfast  sodium chloride 0.9%. 1000 milliLiter(s) (125 mL/Hr) IV Continuous <Continuous>    MEDICATIONS  (PRN):  ALBUTerol/ipratropium for Nebulization 3 milliLiter(s) Nebulizer every 6 hours PRN Shortness of Breath and/or Wheezing  HYDROcodone/homatropine Syrup 5 milliLiter(s) Oral every 4 hours PRN Cough      Allergies    No Known Allergies    Intolerances        SOCIAL HISTORY:  No drug use, no alcohol use. Former smoker, quit 50 years ago. 7 pack year hx.    FAMILY HISTORY:  No pertinent family history in first degree relatives      Review of Systems  CONSTITUTIONAL:  Fevers / chills, sweats, fatigue, weight loss, weight gain                                    NEGATIVE  NEURO:  parathesias, seizures, syncope, confusion                                                                               NEGATIVE  EYES:  Blurry vision, discharge, pain, loss of vision                                                                                  NEGATIVE  ENMT:  Difficulty hearing, vertigo, dysphagia, epistaxis, recent dental work                                     NEGATIVE  CV:  Chest pain, palpitations, BARCENAS, orthopnea                                                                                         NEGATIVE  RESPIRATORY:  +hemoptysis. Wheezing, SOB, cough / sputum,                                                         POSITIVE  GI:  Nausea, vommiting, diarrhea, constipation, melena                                                                        NEGATIVE  : Hematuria, dysuria, urgency, incontinence                                                                                       NEGATIVE  MUSKULOSKELETAL:  arthritis, joint swelling, muscle weakness                                                           NEGATIVE  SKIN/BREAST:  rash, itching, ashley loss, masses                                                                                            NEGATIVE  PSYCH:  depresion, anxiety, suicidal ideation                                                                                             NEGATIVE  HEME/LYMPH:  bruises easily, enlarged lymph nodes, tender lymph nodes                                        NEGATIVE  ENDOCRINE:  cold intolerance, heat intolerance, polydipsia                                                                   NEGATIVE    PHYSICAL EXAM  Vital Signs Last 24 Hrs  T(C): 37.8 (19 Dec 2018 15:52), Max: 37.8 (19 Dec 2018 15:00)  T(F): 100 (19 Dec 2018 15:52), Max: 100 (19 Dec 2018 15:00)  HR: 108 (19 Dec 2018 17:00) (108 - 116)  BP: 105/75 (19 Dec 2018 17:00) (83/61 - 105/75)  BP(mean): 68 (19 Dec 2018 17:00) (67 - 85)  RR: 27 (19 Dec 2018 17:00) (16 - 27)  SpO2: 97% (19 Dec 2018 17:00) (96% - 97%)    To be completed  GEN: NAD, looks comfortable  Psych: Mood appropriate  Neuro: A&Ox3.  No focal deficits.  Moving all extremities.   HEENT: No obvious abnormalities  CV: S1S2, regular, no murmurs appreciated.  No carotid bruits.  No JVD  Lungs: Clear B/L.  No wheezing, rales or rhonchi  ABD: Soft, non-tender, non-distended.  +Bowel sounds  EXT: Warm and well perfused.  No peripheral edema noted  Musculoskeletal: Moving all extremities with normal ROM, no joint swelling  PV: Pedal pulses palpable                                                            LABS:                        10.2   14.4  )-----------( 143      ( 19 Dec 2018 17:10 )             31.2     12-19    138  |  103  |  24<H>  ----------------------------<  115<H>  3.7   |  20<L>  |  1.20    Ca    7.5<L>      19 Dec 2018 17:09  Mg     2.0     12-19    TPro  5.1<L>  /  Alb  2.7<L>  /  TBili  0.9  /  DBili  x   /  AST  78<H>  /  ALT  33  /  AlkPhos  80  12-19    PT/INR - ( 19 Dec 2018 17:09 )   PT: 15.1 sec;   INR: 1.32          PTT - ( 19 Dec 2018 17:09 )  PTT:32.1 sec    CARDIAC MARKERS ( 19 Dec 2018 17:09 )  x     / 2.67 ng/mL / 724 U/L / x     / 17.8 ng/mL          RADIOLOGY & ADDITIONAL STUDIES:  CXR shows B/L haziness in the bases; possible right sided mid-lung infiltrate; Official reading pending.     < from: Echocardiogram (12.19.18 @ 16:56) >  The left atrium is dilated. The right atrium is dilated.There is mild   aortic   valve thickening. No aortic regurgitation noted. No hemodynamically   significant valvular aortic stenosis.  There is mild mitral valve   thickening.   Posterior mitral leaflet not well visualized - probably diminutive.   There is   atleast moderate eccentric mitral regurgitation. Severity might be   underestimated due to eccentricity. The mean pressure gradient is 8 mmHg.   Structurally normal tricuspid valve. There is moderate to severe   tricuspid   regurgitation.  The pulmonary artery systolic pressure is estimated to   be 47   mmHg.The pulmonic valve is not well visualized.The right ventricle is   not well   visualized. The right ventricular systolic function is normal.  Left   ventricular hypertrophy present Accurate assessment of regional wall   motion   and LV ejection fraction is limited due to poor endocardial delineation,   off   axis views and tachycardia. The left ventricular ejection fraction is   estimated to be 55-60%  No aortic root dilatation.A trivial pericardial   effusion noted.Left pleural effusion noted.    < end of copied text >      Cardiac Cath: Planned for tomorrow.

## 2018-12-19 NOTE — PROGRESS NOTE ADULT - SUBJECTIVE AND OBJECTIVE BOX
INFECTIOUS DISEASE SERVICE INITIAL CONSULT NOTE    HPI:  77 yo F w/ HTN, Asthma, HLD, GERD, SVT, MVP, osteoporosis, lung nodule (RUL, for 2 years), necrotizing PNA s/p biopsy showing aspergillosis transferred from [facility] w/ NSTEMI for cardiac cath. In october, pt had routine CXR for monitoring of a RUL lung nodule (that shes has had for two years), that showed increase in size. Pt s/p bronchoscopy 10/31 w/ negative biopsy and culture. Pt had repeat bronchoscopy 2 weeks ago at [facility] which revealed aspergillosis. She followed up w/ her outpt pulmonologist 12/13 because she was having hemoptysis and cough (filled 2 tissues with blood), who referred her to [facility] ED. Pt admitted for w/ "moderate hemoptysis" and CT chest showing cavitary lesion concerning for necrotizing PNA. ID consulted, Bcx neg, and pt was started on amphotericin B (bc voriconazole interacts w/ flecainide, so used while cleared her system) for invasive aspergillosis c/b HOLA, n/v. Switched to voriconazole when flecainide cleared her two days later, w/ improvement in Cr. On the night prior to transfer to [facility],, pt spiked fever of 102 with n/v. Bcx drawn, trops I were 50. Pt had no EKG changes, CP, or SOB. Started on coreg, lipitor, lovenox, loaded w/ ASA, plavix. S/p zosyn x1 am of admission for fever. Pt defervesced, hemodynamically stable, 1 episode of paroxysmal afib that resolved s/p diltiazem. Transferred to [facility] for cardiac cath in setting of NSTEMI. On arrival, pt afebrile, tachycardic, with BP in 100s.  Pt denied CP, SOB, fevers, chills, reports stable cough, no current hemoptysis. Denies pain, dysuria, urinary frequency change, diarrhea, cardiac history. (19 Dec 2018 16:03)      ADDITIONAL ID HPI:  In talking to Pulmonology, who was able to retrieve some records - the second Bronchoscopy done at [facility] revealed Aspergillus based on an FNA obtained and showed fungal elements with 45 and 90 degree branching most consistent with aspergillus (information obtained from Dr Palacios from [facility]). No treatment was pursued at this time. Patient developed hemoptysis 12/13, with no further incidence after. She was initially on Amphotericin given c/f drug interaction between Voriconazole and Flecainide. Transitioned to Voriconazole after Flecainide was stopped, seems to have completed 6 days treatment until medication stopped due to N/V. Her CT at OSH was being read as having a cavitary lesion with aspergilloma, but this is not the case.      Patient has a history of Asthma but no risk factors for invasive Aspergillosus such as chronic steroid use or immunosuppression.      PAST MEDICAL & SURGICAL HISTORY:  Asthma, unspecified asthma severity, unspecified whether complicated, unspecified whether persistent  High cholesterol  Hypertension    REVIEW OF SYSTEMS:  Otherwise negative other than what is stated in the HPI.    ACTIVE ANTIMICROBIAL/ANTIBIOTIC MEDICATIONS:  S/p Amphotericin (2 days) and Voriconazole.     OTHER MEDICATIONS:  ALBUTerol/ipratropium for Nebulization 3 milliLiter(s) Nebulizer every 6 hours PRN  atorvastatin 80 milliGRAM(s) Oral at bedtime  buDESOnide 160 MICROgram(s)/formoterol 4.5 MICROgram(s) Inhaler 2 Puff(s) Inhalation two times a day  heparin  Infusion 1000 Unit(s)/Hr IV Continuous <Continuous>  HYDROcodone/homatropine Syrup 5 milliLiter(s) Oral every 4 hours PRN  montelukast 10 milliGRAM(s) Oral daily  pantoprazole    Tablet 40 milliGRAM(s) Oral before breakfast  sodium chloride 0.9%. 1000 milliLiter(s) IV Continuous <Continuous>      ALLERGIES:  Allergies  No Known Allergies  Intolerances      SOCIAL HISTORY:  , lives [facility]. No drug use, no alcohol use. Former smoker, quit 50 years ago. 7 pack year hx.    FAMILY HISTORY:  No pertinent family history in first degree relatives      VITAL SIGNS:  ICU Vital Signs Last 24 Hrs  T(C): 37.8 (19 Dec 2018 15:52), Max: 37.8 (19 Dec 2018 15:00)  T(F): 100 (19 Dec 2018 15:52), Max: 100 (19 Dec 2018 15:00)  HR: 120 (19 Dec 2018 18:00) (108 - 120)  BP: 87/72 (19 Dec 2018 18:00) (83/61 - 105/75)  BP(mean): 79 (19 Dec 2018 18:00) (67 - 85)  RR: 27 (19 Dec 2018 18:00) (16 - 27)  SpO2: 94% (19 Dec 2018 18:00) (94% - 97%)      PHYSICAL EXAM:  Constitutional: No acute distress.   Head: NC/AT  Eyes: PERRL; anicteric sclera  ENMT: no rhinorrhea; no sinus tenderness on palpation; no oropharyngeal lesions, erythema, or exudates  Neck: supple; prominent JVD  Respiratory: Normal inspiratory effort. Crackles at R lung base.   Cardiovascular: Tachycardic, irregular rhythm. S1S2, no murmurs.   Gastrointestinal: soft, NT/ND; intact BS; no HSM  Extremities: WWP; no clubbing, cyanosis, or edema  Vascular: 2+ radial, DP/PT pulses B/L  Dermatologic: skin warm and dry; no visible rashes or lesions  Neurologic: AAOx3; no focal deficits    LABS:                        10.2   14.4  )-----------( 143      ( 19 Dec 2018 17:10 )             31.2     12-19    138  |  103  |  24<H>  ----------------------------<  115<H>  3.7   |  20<L>  |  1.20    Ca    7.5<L>      19 Dec 2018 17:09  Mg     2.0     12-19    TPro  5.1<L>  /  Alb  2.7<L>  /  TBili  0.9  /  DBili  x   /  AST  78<H>  /  ALT  33  /  AlkPhos  80  12-19    PT/INR - ( 19 Dec 2018 17:09 )   PT: 15.1 sec;   INR: 1.32       PTT - ( 19 Dec 2018 17:09 )  PTT:32.1 sec      MICROBIOLOGY: None. Need to obtain records from second bronchoscopy in [facility].     RADIOLOGY & ADDITIONAL STUDIES:  < from: Echocardiogram (12.19.18 @ 16:56) >  The left atrium is dilated. The right atrium is dilated.There is mild   aortic   valve thickening. No aortic regurgitation noted. No hemodynamically   significant valvular aortic stenosis.  There is mild mitral valve   thickening.   Posterior mitral leaflet not well visualized - probably diminutive.   There is   atleast moderate eccentric mitral regurgitation. Severity might be   underestimated due to eccentricity. The mean pressure gradient is 8 mmHg.   Structurally normal tricuspid valve. There is moderate to severe   tricuspid   regurgitation.  The pulmonary artery systolic pressure is estimated to   be 47   mmHg.The pulmonic valve is not well visualized.The right ventricle is   not well   visualized. The right ventricular systolic function is normal.  Left   ventricular hypertrophy present Accurate assessment of regional wall   motion   and LV ejection fraction is limited due to poor endocardial delineation,   off   axis views and tachycardia. The left ventricular ejection fraction is   estimated to be 55-60%  No aortic root dilatation.A trivial pericardial   effusion noted.Left pleural effusion noted. INFECTIOUS DISEASE SERVICE INITIAL CONSULT NOTE    HPI:  79 yo F w/ HTN, Asthma, HLD, GERD, SVT, MVP, osteoporosis, lung nodule (RUL, for 2 years), necrotizing PNA s/p biopsy showing aspergillosis transferred from [facility] w/ NSTEMI for cardiac cath. In october, pt had routine CXR for monitoring of a RUL lung nodule (that shes has had for two years), that showed increase in size. Pt s/p bronchoscopy 10/31 w/ negative biopsy and culture. Pt had repeat bronchoscopy 2 weeks ago at [facility] which revealed aspergillosis. She followed up w/ her outpt pulmonologist 12/13 because she was having hemoptysis and cough (filled 2 tissues with blood), who referred her to [facility] ED. Pt admitted for w/ "moderate hemoptysis" and CT chest showing cavitary lesion concerning for necrotizing PNA. ID consulted, Bcx neg, and pt was started on amphotericin B (bc voriconazole interacts w/ flecainide, so used while cleared her system) for invasive aspergillosis c/b HOLA, n/v. Switched to voriconazole when flecainide cleared her two days later, w/ improvement in Cr. On the night prior to transfer to [facility],, pt spiked fever of 102 with n/v. Bcx drawn, trops I were 50. Pt had no EKG changes, CP, or SOB. Started on coreg, lipitor, lovenox, loaded w/ ASA, plavix. S/p zosyn x1 am of admission for fever. Pt defervesced, hemodynamically stable, 1 episode of paroxysmal afib that resolved s/p diltiazem. Transferred to [facility] for cardiac cath in setting of NSTEMI. On arrival, pt afebrile, tachycardic, with BP in 100s.  Pt denied CP, SOB, fevers, chills, reports stable cough, no current hemoptysis. Denies pain, dysuria, urinary frequency change, diarrhea, cardiac history. (19 Dec 2018 16:03)      ADDITIONAL ID HPI:  In talking to Pulmonology, who was able to retrieve some records - the second Bronchoscopy done at [facility] revealed Aspergillus based on an FNA obtained and showed fungal elements with 45 and 90 degree branching most consistent with aspergillus (information obtained from Dr Palacios from [facility]). No treatment was pursued at this time. Patient developed hemoptysis 12/13, with no further incidence after. Her CT at OSH was being read as having a focal pneumonia in the RUL/RML suspicious for a low grade necrotizing pneumonia. She was initially on Amphotericin given c/f drug interaction between Voriconazole and Flecainide. Transitioned to Voriconazole after Flecainide was stopped, seems to have completed 6 days treatment until medication stopped due to N/V.      Patient has a history of Asthma but no risk factors for invasive Aspergillosus such as chronic steroid use or immunosuppression.      PAST MEDICAL & SURGICAL HISTORY:  Asthma, unspecified asthma severity, unspecified whether complicated, unspecified whether persistent  High cholesterol  Hypertension    REVIEW OF SYSTEMS:  Otherwise negative other than what is stated in the HPI.    ACTIVE ANTIMICROBIAL/ANTIBIOTIC MEDICATIONS:  S/p Amphotericin (2 days) and Voriconazole.     OTHER MEDICATIONS:  ALBUTerol/ipratropium for Nebulization 3 milliLiter(s) Nebulizer every 6 hours PRN  atorvastatin 80 milliGRAM(s) Oral at bedtime  buDESOnide 160 MICROgram(s)/formoterol 4.5 MICROgram(s) Inhaler 2 Puff(s) Inhalation two times a day  heparin  Infusion 1000 Unit(s)/Hr IV Continuous <Continuous>  HYDROcodone/homatropine Syrup 5 milliLiter(s) Oral every 4 hours PRN  montelukast 10 milliGRAM(s) Oral daily  pantoprazole    Tablet 40 milliGRAM(s) Oral before breakfast  sodium chloride 0.9%. 1000 milliLiter(s) IV Continuous <Continuous>      ALLERGIES:  Allergies  No Known Allergies  Intolerances      SOCIAL HISTORY:  , lives [facility]. No drug use, no alcohol use. Former smoker, quit 50 years ago. 7 pack year hx.    FAMILY HISTORY:  No pertinent family history in first degree relatives      VITAL SIGNS:  ICU Vital Signs Last 24 Hrs  T(C): 37.8 (19 Dec 2018 15:52), Max: 37.8 (19 Dec 2018 15:00)  T(F): 100 (19 Dec 2018 15:52), Max: 100 (19 Dec 2018 15:00)  HR: 120 (19 Dec 2018 18:00) (108 - 120)  BP: 87/72 (19 Dec 2018 18:00) (83/61 - 105/75)  BP(mean): 79 (19 Dec 2018 18:00) (67 - 85)  RR: 27 (19 Dec 2018 18:00) (16 - 27)  SpO2: 94% (19 Dec 2018 18:00) (94% - 97%)      PHYSICAL EXAM:  Constitutional: No acute distress.   Head: NC/AT  Eyes: PERRL; anicteric sclera  ENMT: no rhinorrhea; no sinus tenderness on palpation; no oropharyngeal lesions, erythema, or exudates  Neck: supple; prominent JVD  Respiratory: Normal inspiratory effort. Crackles at R lung base.   Cardiovascular: Tachycardic, irregular rhythm. S1S2, no murmurs.   Gastrointestinal: soft, NT/ND; intact BS; no HSM  Extremities: WWP; no clubbing, cyanosis, or edema  Vascular: 2+ radial, DP/PT pulses B/L  Dermatologic: skin warm and dry; no visible rashes or lesions  Neurologic: AAOx3; no focal deficits    LABS:                        10.2   14.4  )-----------( 143      ( 19 Dec 2018 17:10 )             31.2     12-19    138  |  103  |  24<H>  ----------------------------<  115<H>  3.7   |  20<L>  |  1.20    Ca    7.5<L>      19 Dec 2018 17:09  Mg     2.0     12-19    TPro  5.1<L>  /  Alb  2.7<L>  /  TBili  0.9  /  DBili  x   /  AST  78<H>  /  ALT  33  /  AlkPhos  80  12-19    PT/INR - ( 19 Dec 2018 17:09 )   PT: 15.1 sec;   INR: 1.32       PTT - ( 19 Dec 2018 17:09 )  PTT:32.1 sec      MICROBIOLOGY: None. Need to obtain records from second bronchoscopy in [facility].     RADIOLOGY & ADDITIONAL STUDIES:  < from: Echocardiogram (12.19.18 @ 16:56) >  The left atrium is dilated. The right atrium is dilated.There is mild   aortic   valve thickening. No aortic regurgitation noted. No hemodynamically   significant valvular aortic stenosis.  There is mild mitral valve   thickening.   Posterior mitral leaflet not well visualized - probably diminutive.   There is   atleast moderate eccentric mitral regurgitation. Severity might be   underestimated due to eccentricity. The mean pressure gradient is 8 mmHg.   Structurally normal tricuspid valve. There is moderate to severe   tricuspid   regurgitation.  The pulmonary artery systolic pressure is estimated to   be 47   mmHg.The pulmonic valve is not well visualized.The right ventricle is   not well   visualized. The right ventricular systolic function is normal.  Left   ventricular hypertrophy present Accurate assessment of regional wall   motion   and LV ejection fraction is limited due to poor endocardial delineation,   off   axis views and tachycardia. The left ventricular ejection fraction is   estimated to be 55-60%  No aortic root dilatation.A trivial pericardial   effusion noted.Left pleural effusion noted.

## 2018-12-19 NOTE — PATIENT PROFILE ADULT - NSPROSPIRITUALVALUESFT_GEN_A_NUR
CARDIOVASCULAR - ADULT     Maintains optimal cardiac output and hemodynamic stability Progressing     Absence of cardiac dysrhythmias or at baseline rhythm Progressing        DISCHARGE PLANNING     Discharge to home or other facility with appropriate resources Progressing        HEMATOLOGIC - ADULT     Maintains hematologic stability Progressing        Knowledge Deficit     Patient/family/caregiver demonstrates understanding of disease process, treatment plan, medications, and discharge instructions Progressing        PAIN - ADULT     Verbalizes/displays adequate comfort level or baseline comfort level Progressing        SAFETY ADULT     Patient will remain free of falls Progressing     Maintain or return to baseline ADL function Progressing     Maintain or return mobility status to optimal level Progressing
none

## 2018-12-19 NOTE — CONSULT NOTE ADULT - SUBJECTIVE AND OBJECTIVE BOX
Patient is a 78y old  Female who presents with a chief complaint of NSTEMI.      HPI:  This is a 77 y/o woman with a hx of  HTN, asthma, HLD, and recently diagnosed Aspergillus necrotizing PNA  that was diagnosed in early december on bronchoscopic biopsy. The patient had a hx of a RUL pulmonary nodule that was being followed by her pulmonologist and on 10/31/18 she had a repeat CXR that showed progression of the nodule.         cough w/ hemopytsis outpt, w/u revealed necrotizing PNA s/p biopsy showing aspergilloma. Started on amphoteracin c/b renal failure,     and n/v. Switched to Voriconazole, w/ improvment in Cr. Night of 12/18, pt spiked a fever to 102 with n/v. Bcx drawn, one set neg,     one pending. Trops I were 50, no EKG changes, no CP. Started on coreg, lipitor,lovenox, loaded w/ ASA, plavix. S/p zosyn for     empiric tx of fever, depsite voriconazole therapy. Defervesced, hemodynamicvally stable, 1 episode of paraoxysmal afib that self-    resolved, now in NSR. Hemopytsis resolved. Transferred to [facility] for cardiac cath.     On arrival, pt denied CP, SOB, fevers, chills, reports stable cough, no hemoptysis. Denies pain. (19 Dec 2018 16:03)      PAST MEDICAL & SURGICAL HISTORY:  Asthma, unspecified asthma severity, unspecified whether complicated, unspecified whether persistent  High cholesterol  Hypertension      FAMILY HISTORY:  No pertinent family history in first degree relatives      SOCIAL HISTORY:  Smoking Status: [ ] Current, [ ] Former, [ ] Never  Pack Years:    MEDICATIONS:  Pulmonary:  buDESOnide 160 MICROgram(s)/formoterol 4.5 MICROgram(s) Inhaler 2 Puff(s) Inhalation two times a day  HYDROcodone/homatropine Syrup 5 milliLiter(s) Oral every 4 hours PRN  montelukast 10 milliGRAM(s) Oral daily    Antimicrobials:    Anticoagulants:    Onc:    GI/:  pantoprazole    Tablet 40 milliGRAM(s) Oral before breakfast    Endocrine:  atorvastatin 80 milliGRAM(s) Oral at bedtime    Cardiac:    Other Medications:  atorvastatin 80 milliGRAM(s) Oral at bedtime  sodium chloride 0.9% Bolus 500 milliLiter(s) IV Bolus once  sodium chloride 0.9%. 1000 milliLiter(s) IV Continuous <Continuous>      Allergies    No Known Allergies    Intolerances        Vital Signs Last 24 Hrs  T(C): 37.8 (19 Dec 2018 15:52), Max: 37.8 (19 Dec 2018 15:00)  T(F): 100 (19 Dec 2018 15:52), Max: 100 (19 Dec 2018 15:00)  HR: 116 (19 Dec 2018 16:00) (110 - 116)  BP: 105/75 (19 Dec 2018 16:00) (83/61 - 105/75)  BP(mean): 85 (19 Dec 2018 16:00) (67 - 85)  RR: 21 (19 Dec 2018 16:00) (16 - 21)  SpO2: 96% (19 Dec 2018 16:00) (96% - 96%)        LABS:                                RADIOLOGY & ADDITIONAL STUDIES (The following images were personally reviewed): Patient is a 78y old  Female who presents with a chief complaint of NSTEMI.      HPI:  This is a 79 y/o woman with a hx of  HTN, asthma, HLD, and recently diagnosed Aspergillus necrotizing PNA  that was diagnosed in early december on bronchoscopic biopsy. The patient had a hx of a RUL pulmonary nodule that was being followed by her pulmonologist and on 10/31/18 she had a repeat CXR that showed progression of the nodule. The patient went to her pulmonologists office and was found to have worsening of her X-ray and was sent to  ED for further workup. Per the records at [facility] the patient was found to have Aspergillus on a biopsy. The patient was treated with Amphotericin, and had her flecainide discontinued. The patient delevoped        cough w/ hemopytsis outpt, w/u revealed necrotizing PNA s/p biopsy showing aspergilloma. Started on amphoteracin c/b renal failure,     and n/v. Switched to Voriconazole, w/ improvment in Cr. Night of 12/18, pt spiked a fever to 102 with n/v. Bcx drawn, one set neg,     one pending. Trops I were 50, no EKG changes, no CP. Started on coreg, lipitor,lovenox, loaded w/ ASA, plavix. S/p zosyn for     empiric tx of fever, depsite voriconazole therapy. Defervesced, hemodynamicvally stable, 1 episode of paraoxysmal afib that self-    resolved, now in NSR. Hemopytsis resolved. Transferred to [facility] for cardiac cath.     On arrival, pt denied CP, SOB, fevers, chills, reports stable cough, no hemoptysis. Denies pain. (19 Dec 2018 16:03)      PAST MEDICAL & SURGICAL HISTORY:  Asthma, unspecified asthma severity, unspecified whether complicated, unspecified whether persistent  High cholesterol  Hypertension      FAMILY HISTORY:  No pertinent family history in first degree relatives      SOCIAL HISTORY:  Smoking Status: [ ] Current, [ ] Former, [ ] Never  Pack Years:    MEDICATIONS:  Pulmonary:  buDESOnide 160 MICROgram(s)/formoterol 4.5 MICROgram(s) Inhaler 2 Puff(s) Inhalation two times a day  HYDROcodone/homatropine Syrup 5 milliLiter(s) Oral every 4 hours PRN  montelukast 10 milliGRAM(s) Oral daily    Antimicrobials:    Anticoagulants:    Onc:    GI/:  pantoprazole    Tablet 40 milliGRAM(s) Oral before breakfast    Endocrine:  atorvastatin 80 milliGRAM(s) Oral at bedtime    Cardiac:    Other Medications:  atorvastatin 80 milliGRAM(s) Oral at bedtime  sodium chloride 0.9% Bolus 500 milliLiter(s) IV Bolus once  sodium chloride 0.9%. 1000 milliLiter(s) IV Continuous <Continuous>      Allergies    No Known Allergies    Intolerances        Vital Signs Last 24 Hrs  T(C): 37.8 (19 Dec 2018 15:52), Max: 37.8 (19 Dec 2018 15:00)  T(F): 100 (19 Dec 2018 15:52), Max: 100 (19 Dec 2018 15:00)  HR: 116 (19 Dec 2018 16:00) (110 - 116)  BP: 105/75 (19 Dec 2018 16:00) (83/61 - 105/75)  BP(mean): 85 (19 Dec 2018 16:00) (67 - 85)  RR: 21 (19 Dec 2018 16:00) (16 - 21)  SpO2: 96% (19 Dec 2018 16:00) (96% - 96%)        LABS:                                RADIOLOGY & ADDITIONAL STUDIES (The following images were personally reviewed): Patient is a 78y old  Female who presents with a chief complaint of NSTEMI.      HPI:  This is a 77 y/o woman with a hx of  HTN, asthma, HLD, and recently diagnosed Aspergillus necrotizing PNA  that was diagnosed in early december on a radial EBUS Transbronchial done at [facility]. The patient had a hx of a RUL pulmonary nodule that was being followed by her pulmonologist and on 10/31/18 she had a repeat CXR that showed progression of the nodule. A bronchoscopy was done with BAL in November which was negative and thus the Dr. Palacios (Pulmonologist) referred the pt to thoracic surgery at [facility] for biopsy. The biopsy done in early December showed fungal elements with 45 and 90 degree branching most consistent with aspergillus (per discussion on phone with Dr. Palacios who will fax over the records). The patient then presented with hemoptysis to his office and given the recent diagnosis the patient was sent to the ED. The patient was on flecanide for her hx of SVT and due to concern for drug interactions with voriconazole the flecainide was held with the intention of starting voriconazole after 48 hours off of flecainide. The patient was started on amphotericin in the interim given the patients hemoptysis which was noted to be a moderate amount of angelo blood. The patient developed HOLA on amphotericin and was switched to voriconazole which the patient received for 6 days. The patient overall tolerated the medication however did suffer from nausea while on it. The patient developed Afib RVR, had fever (was started on zosyn), and was found to have a troponin of 50 and was found to have an NSTEMI and thus was transferred to [facility] for further management of her cardiac issues. The pt has been ASA and Plavix loaded and received a dose of lovenox and is to be started on a heparin gtt for her NSTEMI. The pulmonary service was consulted to help assess this patients pneumonia. The patient overall has a flat affect and her Ex- was present at bedside and assisted in providing much of the history.       The patient was denies any acute complaints of SOB, however she does note a slight cough. The patient notes that the last episode of hemoptysis was at this point almost a week ago. The patient denies feeling feverish, chills, nausea, chest pain, palpitations, or abdominal pain. Patient denies wheezing.     ROS:  A 14 point ROS was reviewed with the patient and was negative except for what is mentioned above.       PAST MEDICAL & SURGICAL HISTORY:  Asthma, unspecified asthma severity, unspecified whether complicated, unspecified whether persistent  High cholesterol  Hypertension      FAMILY HISTORY:  No pertinent family history in first degree relatives      SOCIAL HISTORY:  Smoking Status:  [X ] Former, quit over 50 years ago.     MEDICATIONS:  Pulmonary:  buDESOnide 160 MICROgram(s)/formoterol 4.5 MICROgram(s) Inhaler 2 Puff(s) Inhalation two times a day  HYDROcodone/homatropine Syrup 5 milliLiter(s) Oral every 4 hours PRN  montelukast 10 milliGRAM(s) Oral daily    Antimicrobials:    Anticoagulants:    Onc:    GI/:  pantoprazole    Tablet 40 milliGRAM(s) Oral before breakfast    Endocrine:  atorvastatin 80 milliGRAM(s) Oral at bedtime    Cardiac:    Other Medications:  atorvastatin 80 milliGRAM(s) Oral at bedtime  sodium chloride 0.9% Bolus 500 milliLiter(s) IV Bolus once  sodium chloride 0.9%. 1000 milliLiter(s) IV Continuous <Continuous>      Allergies    No Known Allergies    Intolerances        Vital Signs Last 24 Hrs  T(C): 37.8 (19 Dec 2018 15:52), Max: 37.8 (19 Dec 2018 15:00)  T(F): 100 (19 Dec 2018 15:52), Max: 100 (19 Dec 2018 15:00)  HR: 116 (19 Dec 2018 16:00) (110 - 116)  BP: 105/75 (19 Dec 2018 16:00) (83/61 - 105/75)  BP(mean): 85 (19 Dec 2018 16:00) (67 - 85)  RR: 21 (19 Dec 2018 16:00) (16 - 21)  SpO2: 96% (19 Dec 2018 16:00) (96% - 96%)    General: NAD, AAO x3, Flat affect  HEENT: No icterus,. Moist mucous membranes, Mallampati IV.   Neck: Mild JVD noted. Supple, no meningismus  Cardio: S1, S2 noted, irregularly irregular, tachycardic. No murmurs, rubs or gallops  Resp: Slight rales at right base, no wheezes, rales, or rhonchi. No adventitious sounds  Abdo: Soft, NT, bowel sounds present. No organomegaly  Extremities: No edema noted. Pulses present b/l  Neuro: AAO x3, grossly normal motor strength.  Lymphnodes: no lymphadenopathy identified.  Skin: Dry, no rashes    LABS:                                RADIOLOGY & ADDITIONAL STUDIES (The following images were personally reviewed):

## 2018-12-19 NOTE — H&P ADULT - PMH
Asthma, unspecified asthma severity, unspecified whether complicated, unspecified whether persistent    High cholesterol    Hypertension

## 2018-12-19 NOTE — PROGRESS NOTE ADULT - ASSESSMENT
77 yo F w/ HTN, Asthma (not on steroids), HLD, GERD, SVT (on Flecainide), MVP, Osteoporosis, known Lung nodule (RUL, for 2 years) - who presents as a transfer from OSH for NSTEMI management. Patient presented initially to OSH for hemoptysis in the context of a Bronchoscopy performed 2 weeks prior that reportedly was positive on FNA/tissue biopsy for Aspergillus. This pulmonary workup was being performed for evaluation of the known lung nodule that had been found to be enlarging. During hospitalization, patient was started on anti-fungal treatment (Amphotericin initially, then switched to Voriconazole) given c/f invasive aspergillosus based on report of prior bronch, but treatments were halted 2/2 intolerance of side effects. Patient has no obvious risk factors for the development of an invasive aspergillosus (no chronic steroid use, immunocompromise). Would also persist with HCAP coverage at this time as it can not be excluded that patient has a bacterial pneumonia.     Suggest:  - Hold off on anti-fungal therapy at this time until records can be obtained from the second bronchoscopy from [facility]. Team will also work on trying to obtain these.     - C/w HCAP treatment - Vancomycin 1g q 24 based on Cr Cl and Zosyn 2.25q6 hours.

## 2018-12-19 NOTE — H&P ADULT - NSHPPHYSICALEXAM_GEN_ALL_CORE
.  VITAL SIGNS:  T(C): 37.8 (12-19-18 @ 15:52), Max: 37.8 (12-19-18 @ 15:52)  T(F): 100 (12-19-18 @ 15:52), Max: 100 (12-19-18 @ 15:52)  HR: --  BP: --  BP(mean): --  RR: --  SpO2: --  Wt(kg): --    PHYSICAL EXAM:    Constitutional: WDWN resting comfortably in bed; NAD  Head: NC/AT  Eyes: PERRL, EOMI, clear conjunctiva  ENT: no nasal discharge; uvula midline, no oropharyngeal erythema or exudates; MMM  Neck: supple; no JVD or thyromegaly  Respiratory: CTA B/L; no W/R/R, no retractions  Cardiac: +S1/S2; RRR; no M/R/G;  Gastrointestinal: soft, NT/ND; no rebound or guarding; +BSx4  Extremities: WWP, no clubbing or cyanosis; no peripheral edema  Musculoskeletal: NROM x4; no joint swelling, tenderness or erythema  Vascular: 2+ radial, femoral, DP/PT pulses B/L  Dermatologic: skin warm, dry and intact; no rashes, wounds, or scars  Neurologic: AAOx3; CNII-XII grossly intact; no focal deficits  Psychiatric: affect and characteristics of appearance, verbalizations, behaviors are appropriate Vitals:  T(C): 37.8 (12-19-18 @ 15:52), Max: 37.8 (12-19-18 @ 15:00)  HR: 116 (12-19-18 @ 16:00) (110 - 116)  BP: 105/75 (12-19-18 @ 16:00) (83/61 - 105/75)  RR: 21 (12-19-18 @ 16:00) (16 - 21)  SpO2: 96% (12-19-18 @ 16:00) (96% - 96%)      PHYSICAL EXAM:  Constitutional: WDWN resting comfortably in bed; NAD  Head: NC/AT  Eyes: PERRL, EOMI, clear conjunctiva  ENT: no nasal discharge; uvula midline, no oropharyngeal erythema or exudates; MMM  Neck: supple; no JVD or thyromegaly  Respiratory: CTA B/L; no W/R/R, no retractions  Cardiac: +S1/S2; RRR; no M/R/G;  Gastrointestinal: soft, NT/ND; no rebound or guarding; +BSx4  Extremities: WWP, no clubbing or cyanosis; no peripheral edema  Musculoskeletal: NROM x4; no joint swelling, tenderness or erythema  Vascular: 2+ radial, femoral, DP/PT pulses B/L  Dermatologic: skin warm, dry and intact; no rashes, wounds, or scars  Neurologic: AAOx3; CNII-XII grossly intact; no focal deficits  Psychiatric: affect and characteristics of appearance, verbalizations, behaviors are appropriate Vitals:  T(C): 37.8 (12-19-18 @ 15:52), Max: 37.8 (12-19-18 @ 15:00)  HR: 116 (12-19-18 @ 16:00) (110 - 116)  BP: 105/75 (12-19-18 @ 16:00) (83/61 - 105/75)  RR: 21 (12-19-18 @ 16:00) (16 - 21)  SpO2: 96% (12-19-18 @ 16:00) (96% - 96%)      PHYSICAL EXAM:  Constitutional: WDWN resting comfortably in bed; NAD  Head: NC/AT  Eyes: PERRL, EOMI, clear conjunctiva  ENT: no nasal discharge; uvula midline, no oropharyngeal erythema or exudates; MMM  Neck: supple; no JVD or thyromegaly  Respiratory: CTA B/L; no W/R/R, no retractions  Cardiac: +S1/S2; irregular rhythm; no M/R/G;  Gastrointestinal: soft, NT/ND; no rebound or guarding; +BSx4  Extremities: WWP, no clubbing or cyanosis; 1+ peripheral edema  Vascular: 2+ radial, femoral, DP/PT pulses B/L  Dermatologic: skin warm, dry and intact; no rashes, wounds, or scars  Neurologic: AAOx3; CNII-XII grossly intact; no focal deficits  Psychiatric: odd affect

## 2018-12-20 ENCOUNTER — RESULT REVIEW (OUTPATIENT)
Age: 78
End: 2018-12-20

## 2018-12-20 ENCOUNTER — APPOINTMENT (OUTPATIENT)
Dept: CARDIOTHORACIC SURGERY | Facility: HOSPITAL | Age: 78
End: 2018-12-20
Payer: MEDICARE

## 2018-12-20 PROBLEM — Z00.00 ENCOUNTER FOR PREVENTIVE HEALTH EXAMINATION: Status: ACTIVE | Noted: 2018-12-20

## 2018-12-20 LAB
ALBUMIN SERPL ELPH-MCNC: 2 G/DL — LOW (ref 3.3–5)
ALBUMIN SERPL ELPH-MCNC: 2.2 G/DL — LOW (ref 3.3–5)
ALBUMIN SERPL ELPH-MCNC: 2.6 G/DL — LOW (ref 3.3–5)
ALP SERPL-CCNC: 112 U/L — SIGNIFICANT CHANGE UP (ref 40–120)
ALP SERPL-CCNC: 80 U/L — SIGNIFICANT CHANGE UP (ref 40–120)
ALP SERPL-CCNC: 97 U/L — SIGNIFICANT CHANGE UP (ref 40–120)
ALT FLD-CCNC: 1711 U/L — HIGH (ref 10–45)
ALT FLD-CCNC: 4487 U/L — HIGH (ref 10–45)
ALT FLD-CCNC: 5274 U/L — HIGH (ref 10–45)
ANION GAP SERPL CALC-SCNC: 15 MMOL/L — SIGNIFICANT CHANGE UP (ref 5–17)
ANION GAP SERPL CALC-SCNC: 16 MMOL/L — SIGNIFICANT CHANGE UP (ref 5–17)
ANION GAP SERPL CALC-SCNC: 18 MMOL/L — HIGH (ref 5–17)
ANION GAP SERPL CALC-SCNC: 21 MMOL/L — HIGH (ref 5–17)
ANION GAP SERPL CALC-SCNC: 23 MMOL/L — HIGH (ref 5–17)
APPEARANCE UR: CLEAR — SIGNIFICANT CHANGE UP
APTT BLD: 36.8 SEC — HIGH (ref 27.5–36.3)
APTT BLD: 40.3 SEC — HIGH (ref 27.5–36.3)
APTT BLD: 47.1 SEC — HIGH (ref 27.5–36.3)
APTT BLD: 87 SEC — HIGH (ref 27.5–36.3)
AST SERPL-CCNC: 2205 U/L — HIGH (ref 10–40)
AST SERPL-CCNC: >7000 U/L — HIGH (ref 10–40)
AST SERPL-CCNC: >7000 U/L — SIGNIFICANT CHANGE UP (ref 10–40)
BASE EXCESS BLDA CALC-SCNC: -14.4 MMOL/L — LOW (ref -2–3)
BASE EXCESS BLDA CALC-SCNC: -15.7 MMOL/L — LOW (ref -2–3)
BASE EXCESS BLDA CALC-SCNC: -16.3 MMOL/L — LOW (ref -2–3)
BASE EXCESS BLDMV CALC-SCNC: -14.7 MMOL/L — SIGNIFICANT CHANGE UP
BASE EXCESS BLDV CALC-SCNC: -3.9 MMOL/L — SIGNIFICANT CHANGE UP
BASOPHILS NFR BLD AUTO: 0.1 % — SIGNIFICANT CHANGE UP (ref 0–2)
BILIRUB SERPL-MCNC: 1.3 MG/DL — HIGH (ref 0.2–1.2)
BILIRUB SERPL-MCNC: 2.8 MG/DL — HIGH (ref 0.2–1.2)
BILIRUB SERPL-MCNC: 3.1 MG/DL — HIGH (ref 0.2–1.2)
BILIRUB UR-MCNC: NEGATIVE — SIGNIFICANT CHANGE UP
BUN SERPL-MCNC: 25 MG/DL — HIGH (ref 7–23)
BUN SERPL-MCNC: 26 MG/DL — HIGH (ref 7–23)
BUN SERPL-MCNC: 27 MG/DL — HIGH (ref 7–23)
BUN SERPL-MCNC: 28 MG/DL — HIGH (ref 7–23)
BUN SERPL-MCNC: 30 MG/DL — HIGH (ref 7–23)
CALCIUM SERPL-MCNC: 6.4 MG/DL — CRITICAL LOW (ref 8.4–10.5)
CALCIUM SERPL-MCNC: 7.1 MG/DL — LOW (ref 8.4–10.5)
CALCIUM SERPL-MCNC: 8.4 MG/DL — SIGNIFICANT CHANGE UP (ref 8.4–10.5)
CALCIUM SERPL-MCNC: 8.7 MG/DL — SIGNIFICANT CHANGE UP (ref 8.4–10.5)
CALCIUM SERPL-MCNC: 8.7 MG/DL — SIGNIFICANT CHANGE UP (ref 8.4–10.5)
CHLORIDE SERPL-SCNC: 105 MMOL/L — SIGNIFICANT CHANGE UP (ref 96–108)
CHLORIDE SERPL-SCNC: 108 MMOL/L — SIGNIFICANT CHANGE UP (ref 96–108)
CHLORIDE SERPL-SCNC: 109 MMOL/L — HIGH (ref 96–108)
CHLORIDE SERPL-SCNC: 110 MMOL/L — HIGH (ref 96–108)
CHLORIDE SERPL-SCNC: 112 MMOL/L — HIGH (ref 96–108)
CO2 SERPL-SCNC: 11 MMOL/L — LOW (ref 22–31)
CO2 SERPL-SCNC: 13 MMOL/L — LOW (ref 22–31)
CO2 SERPL-SCNC: 18 MMOL/L — LOW (ref 22–31)
CO2 SERPL-SCNC: 18 MMOL/L — LOW (ref 22–31)
CO2 SERPL-SCNC: 19 MMOL/L — LOW (ref 22–31)
COLOR SPEC: YELLOW — SIGNIFICANT CHANGE UP
CREAT SERPL-MCNC: 1.41 MG/DL — HIGH (ref 0.5–1.3)
CREAT SERPL-MCNC: 1.51 MG/DL — HIGH (ref 0.5–1.3)
CREAT SERPL-MCNC: 1.63 MG/DL — HIGH (ref 0.5–1.3)
CREAT SERPL-MCNC: 1.63 MG/DL — HIGH (ref 0.5–1.3)
CREAT SERPL-MCNC: 1.66 MG/DL — HIGH (ref 0.5–1.3)
DIFF PNL FLD: ABNORMAL
EOSINOPHIL NFR BLD AUTO: 0.1 % — SIGNIFICANT CHANGE UP (ref 0–6)
GAS PNL BLDA: SIGNIFICANT CHANGE UP
GAS PNL BLDMV: SIGNIFICANT CHANGE UP
GLUCOSE BLDC GLUCOMTR-MCNC: 198 MG/DL — HIGH (ref 70–99)
GLUCOSE BLDC GLUCOMTR-MCNC: 210 MG/DL — HIGH (ref 70–99)
GLUCOSE SERPL-MCNC: 122 MG/DL — HIGH (ref 70–99)
GLUCOSE SERPL-MCNC: 134 MG/DL — HIGH (ref 70–99)
GLUCOSE SERPL-MCNC: 149 MG/DL — HIGH (ref 70–99)
GLUCOSE SERPL-MCNC: 204 MG/DL — HIGH (ref 70–99)
GLUCOSE SERPL-MCNC: 453 MG/DL — CRITICAL HIGH (ref 70–99)
GLUCOSE UR QL: NEGATIVE — SIGNIFICANT CHANGE UP
HCO3 BLDA-SCNC: 11 MMOL/L — LOW (ref 21–28)
HCO3 BLDA-SCNC: 11 MMOL/L — LOW (ref 21–28)
HCO3 BLDA-SCNC: 12 MMOL/L — LOW (ref 21–28)
HCO3 BLDMV-SCNC: 15 MMOL/L — SIGNIFICANT CHANGE UP
HCO3 BLDV-SCNC: 21 MMOL/L — SIGNIFICANT CHANGE UP (ref 20–27)
HCT VFR BLD CALC: 27.8 % — LOW (ref 34.5–45)
HCT VFR BLD CALC: 33.4 % — LOW (ref 34.5–45)
HCT VFR BLD CALC: 33.5 % — LOW (ref 34.5–45)
HCT VFR BLD CALC: 33.6 % — LOW (ref 34.5–45)
HCT VFR BLD CALC: 34.7 % — SIGNIFICANT CHANGE UP (ref 34.5–45)
HGB BLD-MCNC: 10.5 G/DL — LOW (ref 11.5–15.5)
HGB BLD-MCNC: 11.1 G/DL — LOW (ref 11.5–15.5)
HGB BLD-MCNC: 11.4 G/DL — LOW (ref 11.5–15.5)
HGB BLD-MCNC: 11.5 G/DL — SIGNIFICANT CHANGE UP (ref 11.5–15.5)
HGB BLD-MCNC: 9.6 G/DL — LOW (ref 11.5–15.5)
INR BLD: 2.36 — HIGH (ref 0.88–1.16)
INR BLD: 2.52 — HIGH (ref 0.88–1.16)
INR BLD: 2.62 — HIGH (ref 0.88–1.16)
KETONES UR-MCNC: NEGATIVE — SIGNIFICANT CHANGE UP
LACTATE SERPL-SCNC: 6.1 MMOL/L — CRITICAL HIGH (ref 0.5–2)
LACTATE SERPL-SCNC: 6.5 MMOL/L — CRITICAL HIGH (ref 0.5–2)
LACTATE SERPL-SCNC: 6.9 MMOL/L — CRITICAL HIGH (ref 0.5–2)
LACTATE SERPL-SCNC: 7 MMOL/L — CRITICAL HIGH (ref 0.5–2)
LEUKOCYTE ESTERASE UR-ACNC: NEGATIVE — SIGNIFICANT CHANGE UP
LYMPHOCYTES # BLD AUTO: 8.2 % — LOW (ref 13–44)
MAGNESIUM SERPL-MCNC: 2.3 MG/DL — SIGNIFICANT CHANGE UP (ref 1.6–2.6)
MAGNESIUM SERPL-MCNC: 2.3 MG/DL — SIGNIFICANT CHANGE UP (ref 1.6–2.6)
MAGNESIUM SERPL-MCNC: 2.5 MG/DL — SIGNIFICANT CHANGE UP (ref 1.6–2.6)
MCHC RBC-ENTMCNC: 29.6 PG — SIGNIFICANT CHANGE UP (ref 27–34)
MCHC RBC-ENTMCNC: 29.9 PG — SIGNIFICANT CHANGE UP (ref 27–34)
MCHC RBC-ENTMCNC: 30.2 PG — SIGNIFICANT CHANGE UP (ref 27–34)
MCHC RBC-ENTMCNC: 31.3 G/DL — LOW (ref 32–36)
MCHC RBC-ENTMCNC: 31.3 PG — SIGNIFICANT CHANGE UP (ref 27–34)
MCHC RBC-ENTMCNC: 31.6 PG — SIGNIFICANT CHANGE UP (ref 27–34)
MCHC RBC-ENTMCNC: 32 G/DL — SIGNIFICANT CHANGE UP (ref 32–36)
MCHC RBC-ENTMCNC: 34 G/DL — SIGNIFICANT CHANGE UP (ref 32–36)
MCHC RBC-ENTMCNC: 34.4 G/DL — SIGNIFICANT CHANGE UP (ref 32–36)
MCHC RBC-ENTMCNC: 34.5 G/DL — SIGNIFICANT CHANGE UP (ref 32–36)
MCV RBC AUTO: 100.3 FL — HIGH (ref 80–100)
MCV RBC AUTO: 86.1 FL — SIGNIFICANT CHANGE UP (ref 80–100)
MCV RBC AUTO: 86.6 FL — SIGNIFICANT CHANGE UP (ref 80–100)
MCV RBC AUTO: 88.6 FL — SIGNIFICANT CHANGE UP (ref 80–100)
MCV RBC AUTO: 98.9 FL — SIGNIFICANT CHANGE UP (ref 80–100)
MONOCYTES NFR BLD AUTO: 1.3 % — LOW (ref 2–14)
NEUTROPHILS NFR BLD AUTO: 90.3 % — HIGH (ref 43–77)
NITRITE UR-MCNC: NEGATIVE — SIGNIFICANT CHANGE UP
O2 CT VFR BLD CALC: 35 MMHG — SIGNIFICANT CHANGE UP (ref 30–65)
PCO2 BLDA: 30 MMHG — LOW (ref 32–45)
PCO2 BLDA: 33 MMHG — SIGNIFICANT CHANGE UP (ref 32–45)
PCO2 BLDA: 33 MMHG — SIGNIFICANT CHANGE UP (ref 32–45)
PCO2 BLDMV: 48 MMHG — SIGNIFICANT CHANGE UP (ref 30–65)
PCO2 BLDV: 37 MMHG — LOW (ref 41–51)
PH BLDA: 7.15 — CRITICAL LOW (ref 7.35–7.45)
PH BLDA: 7.19 — CRITICAL LOW (ref 7.35–7.45)
PH BLDA: 7.2 — CRITICAL LOW (ref 7.35–7.45)
PH BLDMV: 7.1 — LOW (ref 7.2–7.45)
PH BLDV: 7.37 — SIGNIFICANT CHANGE UP (ref 7.32–7.43)
PH UR: 5.5 — SIGNIFICANT CHANGE UP (ref 5–8)
PHOSPHATE SERPL-MCNC: 5.6 MG/DL — HIGH (ref 2.5–4.5)
PHOSPHATE SERPL-MCNC: 6 MG/DL — HIGH (ref 2.5–4.5)
PLATELET # BLD AUTO: 112 K/UL — LOW (ref 150–400)
PLATELET # BLD AUTO: 152 K/UL — SIGNIFICANT CHANGE UP (ref 150–400)
PLATELET # BLD AUTO: 39 K/UL — LOW (ref 150–400)
PLATELET # BLD AUTO: 48 K/UL — LOW (ref 150–400)
PLATELET # BLD AUTO: 62 K/UL — LOW (ref 150–400)
PO2 BLDA: 74 MMHG — LOW (ref 83–108)
PO2 BLDA: 75 MMHG — LOW (ref 83–108)
PO2 BLDA: 88 MMHG — SIGNIFICANT CHANGE UP (ref 83–108)
PO2 BLDV: 40 MMHG — SIGNIFICANT CHANGE UP
POTASSIUM SERPL-MCNC: 4 MMOL/L — SIGNIFICANT CHANGE UP (ref 3.5–5.3)
POTASSIUM SERPL-MCNC: 4.2 MMOL/L — SIGNIFICANT CHANGE UP (ref 3.5–5.3)
POTASSIUM SERPL-MCNC: 4.2 MMOL/L — SIGNIFICANT CHANGE UP (ref 3.5–5.3)
POTASSIUM SERPL-MCNC: 5.8 MMOL/L — HIGH (ref 3.5–5.3)
POTASSIUM SERPL-MCNC: 6.1 MMOL/L — HIGH (ref 3.5–5.3)
POTASSIUM SERPL-SCNC: 4 MMOL/L — SIGNIFICANT CHANGE UP (ref 3.5–5.3)
POTASSIUM SERPL-SCNC: 4.2 MMOL/L — SIGNIFICANT CHANGE UP (ref 3.5–5.3)
POTASSIUM SERPL-SCNC: 4.2 MMOL/L — SIGNIFICANT CHANGE UP (ref 3.5–5.3)
POTASSIUM SERPL-SCNC: 5.8 MMOL/L — HIGH (ref 3.5–5.3)
POTASSIUM SERPL-SCNC: 6.1 MMOL/L — HIGH (ref 3.5–5.3)
PROT SERPL-MCNC: 3.8 G/DL — LOW (ref 6–8.3)
PROT SERPL-MCNC: 3.8 G/DL — LOW (ref 6–8.3)
PROT SERPL-MCNC: 4.2 G/DL — LOW (ref 6–8.3)
PROT UR-MCNC: NEGATIVE MG/DL — SIGNIFICANT CHANGE UP
PROTHROM AB SERPL-ACNC: 27.4 SEC — HIGH (ref 10–12.9)
PROTHROM AB SERPL-ACNC: 29.3 SEC — HIGH (ref 10–12.9)
PROTHROM AB SERPL-ACNC: 30.5 SEC — HIGH (ref 10–12.9)
RBC # BLD: 3.21 M/UL — LOW (ref 3.8–5.2)
RBC # BLD: 3.35 M/UL — LOW (ref 3.8–5.2)
RBC # BLD: 3.51 M/UL — LOW (ref 3.8–5.2)
RBC # BLD: 3.78 M/UL — LOW (ref 3.8–5.2)
RBC # BLD: 3.88 M/UL — SIGNIFICANT CHANGE UP (ref 3.8–5.2)
RBC # FLD: 13.4 % — SIGNIFICANT CHANGE UP (ref 10.3–16.9)
RBC # FLD: 13.4 % — SIGNIFICANT CHANGE UP (ref 10.3–16.9)
RBC # FLD: 16.4 % — SIGNIFICANT CHANGE UP (ref 10.3–16.9)
RBC # FLD: 16.5 % — SIGNIFICANT CHANGE UP (ref 10.3–16.9)
RBC # FLD: 16.8 % — SIGNIFICANT CHANGE UP (ref 10.3–16.9)
SAO2 % BLDA: 91 % — LOW (ref 95–100)
SAO2 % BLDA: 92 % — LOW (ref 95–100)
SAO2 % BLDA: 94 % — LOW (ref 95–100)
SAO2 % BLDMV: 48 % — SIGNIFICANT CHANGE UP
SAO2 % BLDV: 74 % — SIGNIFICANT CHANGE UP
SODIUM SERPL-SCNC: 137 MMOL/L — SIGNIFICANT CHANGE UP (ref 135–145)
SODIUM SERPL-SCNC: 139 MMOL/L — SIGNIFICANT CHANGE UP (ref 135–145)
SODIUM SERPL-SCNC: 146 MMOL/L — HIGH (ref 135–145)
SODIUM SERPL-SCNC: 147 MMOL/L — HIGH (ref 135–145)
SODIUM SERPL-SCNC: 147 MMOL/L — HIGH (ref 135–145)
SP GR SPEC: 1.02 — SIGNIFICANT CHANGE UP (ref 1–1.03)
UROBILINOGEN FLD QL: 0.2 E.U./DL — SIGNIFICANT CHANGE UP
WBC # BLD: 12.9 K/UL — HIGH (ref 3.8–10.5)
WBC # BLD: 12.9 K/UL — HIGH (ref 3.8–10.5)
WBC # BLD: 13.1 K/UL — HIGH (ref 3.8–10.5)
WBC # BLD: 13.2 K/UL — HIGH (ref 3.8–10.5)
WBC # BLD: 15 K/UL — HIGH (ref 3.8–10.5)
WBC # FLD AUTO: 12.9 K/UL — HIGH (ref 3.8–10.5)
WBC # FLD AUTO: 12.9 K/UL — HIGH (ref 3.8–10.5)
WBC # FLD AUTO: 13.1 K/UL — HIGH (ref 3.8–10.5)
WBC # FLD AUTO: 13.2 K/UL — HIGH (ref 3.8–10.5)
WBC # FLD AUTO: 15 K/UL — HIGH (ref 3.8–10.5)

## 2018-12-20 PROCEDURE — 71045 X-RAY EXAM CHEST 1 VIEW: CPT | Mod: 26,77,76

## 2018-12-20 PROCEDURE — 99233 SBSQ HOSP IP/OBS HIGH 50: CPT | Mod: GC

## 2018-12-20 PROCEDURE — 99291 CRITICAL CARE FIRST HOUR: CPT

## 2018-12-20 PROCEDURE — 33430 REPLACEMENT OF MITRAL VALVE: CPT

## 2018-12-20 PROCEDURE — 93306 TTE W/DOPPLER COMPLETE: CPT | Mod: 26

## 2018-12-20 PROCEDURE — 71045 X-RAY EXAM CHEST 1 VIEW: CPT | Mod: 26,76

## 2018-12-20 PROCEDURE — 33967 INSERT I-AORT PERCUT DEVICE: CPT

## 2018-12-20 PROCEDURE — 99292 CRITICAL CARE ADDL 30 MIN: CPT

## 2018-12-20 PROCEDURE — 33430 REPLACEMENT OF MITRAL VALVE: CPT | Mod: 80

## 2018-12-20 PROCEDURE — 93454 CORONARY ARTERY ANGIO S&I: CPT | Mod: 26

## 2018-12-20 PROCEDURE — 93010 ELECTROCARDIOGRAM REPORT: CPT

## 2018-12-20 PROCEDURE — 93010 ELECTROCARDIOGRAM REPORT: CPT | Mod: 77

## 2018-12-20 RX ORDER — MAGNESIUM SULFATE 500 MG/ML
2 VIAL (ML) INJECTION ONCE
Qty: 0 | Refills: 0 | Status: COMPLETED | OUTPATIENT
Start: 2018-12-20 | End: 2018-12-20

## 2018-12-20 RX ORDER — HEPARIN SODIUM 5000 [USP'U]/ML
5000 INJECTION INTRAVENOUS; SUBCUTANEOUS EVERY 8 HOURS
Qty: 0 | Refills: 0 | Status: DISCONTINUED | OUTPATIENT
Start: 2018-12-20 | End: 2018-12-20

## 2018-12-20 RX ORDER — PROPOFOL 10 MG/ML
10 INJECTION, EMULSION INTRAVENOUS
Qty: 1000 | Refills: 0 | Status: DISCONTINUED | OUTPATIENT
Start: 2018-12-20 | End: 2018-12-24

## 2018-12-20 RX ORDER — SODIUM BICARBONATE 1 MEQ/ML
50 SYRINGE (ML) INTRAVENOUS ONCE
Qty: 0 | Refills: 0 | Status: COMPLETED | OUTPATIENT
Start: 2018-12-20 | End: 2018-12-20

## 2018-12-20 RX ORDER — PANTOPRAZOLE SODIUM 20 MG/1
8 TABLET, DELAYED RELEASE ORAL
Qty: 80 | Refills: 0 | Status: DISCONTINUED | OUTPATIENT
Start: 2018-12-20 | End: 2018-12-26

## 2018-12-20 RX ORDER — CALCIUM CHLORIDE
100 POWDER (GRAM) MISCELLANEOUS ONCE
Qty: 0 | Refills: 0 | Status: COMPLETED | OUTPATIENT
Start: 2018-12-20 | End: 2018-12-20

## 2018-12-20 RX ORDER — DOBUTAMINE HCL 250MG/20ML
3 VIAL (ML) INTRAVENOUS
Qty: 500 | Refills: 0 | Status: DISCONTINUED | OUTPATIENT
Start: 2018-12-20 | End: 2018-12-27

## 2018-12-20 RX ORDER — MILRINONE LACTATE 1 MG/ML
0.25 INJECTION, SOLUTION INTRAVENOUS
Qty: 20 | Refills: 0 | Status: DISCONTINUED | OUTPATIENT
Start: 2018-12-20 | End: 2018-12-22

## 2018-12-20 RX ORDER — PROPOFOL 10 MG/ML
5 INJECTION, EMULSION INTRAVENOUS
Qty: 1000 | Refills: 0 | Status: DISCONTINUED | OUTPATIENT
Start: 2018-12-20 | End: 2018-12-20

## 2018-12-20 RX ORDER — IPRATROPIUM/ALBUTEROL SULFATE 18-103MCG
3 AEROSOL WITH ADAPTER (GRAM) INHALATION ONCE
Qty: 0 | Refills: 0 | Status: COMPLETED | OUTPATIENT
Start: 2018-12-20 | End: 2018-12-20

## 2018-12-20 RX ORDER — CALCIUM GLUCONATE 100 MG/ML
2 VIAL (ML) INTRAVENOUS ONCE
Qty: 0 | Refills: 0 | Status: COMPLETED | OUTPATIENT
Start: 2018-12-20 | End: 2018-12-20

## 2018-12-20 RX ORDER — MIDAZOLAM HYDROCHLORIDE 1 MG/ML
2 INJECTION, SOLUTION INTRAMUSCULAR; INTRAVENOUS ONCE
Qty: 0 | Refills: 0 | Status: DISCONTINUED | OUTPATIENT
Start: 2018-12-20 | End: 2018-12-20

## 2018-12-20 RX ORDER — MEROPENEM 1 G/30ML
1000 INJECTION INTRAVENOUS EVERY 8 HOURS
Qty: 0 | Refills: 0 | Status: DISCONTINUED | OUTPATIENT
Start: 2018-12-20 | End: 2018-12-20

## 2018-12-20 RX ORDER — SODIUM CHLORIDE 9 MG/ML
1000 INJECTION INTRAMUSCULAR; INTRAVENOUS; SUBCUTANEOUS
Qty: 0 | Refills: 0 | Status: DISCONTINUED | OUTPATIENT
Start: 2018-12-20 | End: 2018-12-27

## 2018-12-20 RX ORDER — EPINEPHRINE 0.3 MG/.3ML
0.04 INJECTION INTRAMUSCULAR; SUBCUTANEOUS
Qty: 4 | Refills: 0 | Status: DISCONTINUED | OUTPATIENT
Start: 2018-12-20 | End: 2018-12-21

## 2018-12-20 RX ORDER — MEROPENEM 1 G/30ML
1000 INJECTION INTRAVENOUS EVERY 8 HOURS
Qty: 0 | Refills: 0 | Status: DISCONTINUED | OUTPATIENT
Start: 2018-12-20 | End: 2018-12-21

## 2018-12-20 RX ORDER — HYDROCORTISONE 20 MG
50 TABLET ORAL ONCE
Qty: 0 | Refills: 0 | Status: COMPLETED | OUTPATIENT
Start: 2018-12-20 | End: 2018-12-20

## 2018-12-20 RX ORDER — NOREPINEPHRINE BITARTRATE/D5W 8 MG/250ML
0.05 PLASTIC BAG, INJECTION (ML) INTRAVENOUS
Qty: 16 | Refills: 0 | Status: DISCONTINUED | OUTPATIENT
Start: 2018-12-20 | End: 2018-12-24

## 2018-12-20 RX ORDER — ALBUMIN HUMAN 25 %
250 VIAL (ML) INTRAVENOUS ONCE
Qty: 0 | Refills: 0 | Status: COMPLETED | OUTPATIENT
Start: 2018-12-20 | End: 2018-12-20

## 2018-12-20 RX ORDER — AMIODARONE HYDROCHLORIDE 400 MG/1
150 TABLET ORAL ONCE
Qty: 0 | Refills: 0 | Status: COMPLETED | OUTPATIENT
Start: 2018-12-20 | End: 2018-12-20

## 2018-12-20 RX ORDER — DEXTROSE 50 % IN WATER 50 %
50 SYRINGE (ML) INTRAVENOUS
Qty: 0 | Refills: 0 | Status: DISCONTINUED | OUTPATIENT
Start: 2018-12-20 | End: 2018-12-27

## 2018-12-20 RX ORDER — VASOPRESSIN 20 [USP'U]/ML
0.01 INJECTION INTRAVENOUS
Qty: 100 | Refills: 0 | Status: DISCONTINUED | OUTPATIENT
Start: 2018-12-20 | End: 2018-12-27

## 2018-12-20 RX ORDER — PHENYLEPHRINE HYDROCHLORIDE 10 MG/ML
0.5 INJECTION INTRAVENOUS
Qty: 40 | Refills: 0 | Status: DISCONTINUED | OUTPATIENT
Start: 2018-12-20 | End: 2018-12-20

## 2018-12-20 RX ORDER — INSULIN HUMAN 100 [IU]/ML
10 INJECTION, SOLUTION SUBCUTANEOUS ONCE
Qty: 0 | Refills: 0 | Status: COMPLETED | OUTPATIENT
Start: 2018-12-20 | End: 2018-12-20

## 2018-12-20 RX ORDER — DEXTROSE 50 % IN WATER 50 %
50 SYRINGE (ML) INTRAVENOUS ONCE
Qty: 0 | Refills: 0 | Status: COMPLETED | OUTPATIENT
Start: 2018-12-20 | End: 2018-12-20

## 2018-12-20 RX ORDER — PHENYLEPHRINE HYDROCHLORIDE 10 MG/ML
0.5 INJECTION INTRAVENOUS
Qty: 160 | Refills: 0 | Status: DISCONTINUED | OUTPATIENT
Start: 2018-12-20 | End: 2018-12-20

## 2018-12-20 RX ORDER — HYDROCORTISONE 20 MG
75 TABLET ORAL EVERY 8 HOURS
Qty: 0 | Refills: 0 | Status: DISCONTINUED | OUTPATIENT
Start: 2018-12-20 | End: 2018-12-26

## 2018-12-20 RX ORDER — ALBUMIN HUMAN 25 %
250 VIAL (ML) INTRAVENOUS
Qty: 0 | Refills: 0 | Status: COMPLETED | OUTPATIENT
Start: 2018-12-20 | End: 2018-12-20

## 2018-12-20 RX ORDER — INSULIN HUMAN 100 [IU]/ML
2 INJECTION, SOLUTION SUBCUTANEOUS
Qty: 100 | Refills: 0 | Status: DISCONTINUED | OUTPATIENT
Start: 2018-12-20 | End: 2018-12-27

## 2018-12-20 RX ORDER — NOREPINEPHRINE BITARTRATE/D5W 8 MG/250ML
0.05 PLASTIC BAG, INJECTION (ML) INTRAVENOUS
Qty: 8 | Refills: 0 | Status: DISCONTINUED | OUTPATIENT
Start: 2018-12-20 | End: 2018-12-20

## 2018-12-20 RX ORDER — SODIUM CHLORIDE 9 MG/ML
1000 INJECTION INTRAMUSCULAR; INTRAVENOUS; SUBCUTANEOUS ONCE
Qty: 0 | Refills: 0 | Status: COMPLETED | OUTPATIENT
Start: 2018-12-20 | End: 2018-12-20

## 2018-12-20 RX ORDER — FUROSEMIDE 40 MG
40 TABLET ORAL ONCE
Qty: 0 | Refills: 0 | Status: COMPLETED | OUTPATIENT
Start: 2018-12-20 | End: 2018-12-20

## 2018-12-20 RX ORDER — PANTOPRAZOLE SODIUM 20 MG/1
40 TABLET, DELAYED RELEASE ORAL DAILY
Qty: 0 | Refills: 0 | Status: DISCONTINUED | OUTPATIENT
Start: 2018-12-20 | End: 2018-12-20

## 2018-12-20 RX ORDER — DOBUTAMINE HCL 250MG/20ML
2.5 VIAL (ML) INTRAVENOUS
Qty: 500 | Refills: 0 | Status: DISCONTINUED | OUTPATIENT
Start: 2018-12-20 | End: 2018-12-20

## 2018-12-20 RX ORDER — CALCIUM GLUCONATE 100 MG/ML
2 VIAL (ML) INTRAVENOUS ONCE
Qty: 0 | Refills: 0 | Status: COMPLETED | OUTPATIENT
Start: 2018-12-20 | End: 2018-12-21

## 2018-12-20 RX ADMIN — AMIODARONE HYDROCHLORIDE 200 MILLIGRAM(S): 400 TABLET ORAL at 19:17

## 2018-12-20 RX ADMIN — Medication 50 GRAM(S): at 03:47

## 2018-12-20 RX ADMIN — VASOPRESSIN 0.6 UNIT(S)/MIN: 20 INJECTION INTRAVENOUS at 23:30

## 2018-12-20 RX ADMIN — SODIUM CHLORIDE 4000 MILLILITER(S): 9 INJECTION INTRAMUSCULAR; INTRAVENOUS; SUBCUTANEOUS at 03:30

## 2018-12-20 RX ADMIN — Medication 40 MILLIGRAM(S): at 06:56

## 2018-12-20 RX ADMIN — MIDAZOLAM HYDROCHLORIDE 2 MILLIGRAM(S): 1 INJECTION, SOLUTION INTRAMUSCULAR; INTRAVENOUS at 06:15

## 2018-12-20 RX ADMIN — PROPOFOL 3.62 MICROGRAM(S)/KG/MIN: 10 INJECTION, EMULSION INTRAVENOUS at 19:16

## 2018-12-20 RX ADMIN — PROPOFOL 1.81 MICROGRAM(S)/KG/MIN: 10 INJECTION, EMULSION INTRAVENOUS at 03:49

## 2018-12-20 RX ADMIN — INSULIN HUMAN 10 UNIT(S): 100 INJECTION, SOLUTION SUBCUTANEOUS at 08:09

## 2018-12-20 RX ADMIN — PIPERACILLIN AND TAZOBACTAM 200 GRAM(S): 4; .5 INJECTION, POWDER, LYOPHILIZED, FOR SOLUTION INTRAVENOUS at 01:03

## 2018-12-20 RX ADMIN — Medication 250 MILLIGRAM(S): at 20:07

## 2018-12-20 RX ADMIN — VASOPRESSIN 0.6 UNIT(S)/MIN: 20 INJECTION INTRAVENOUS at 07:16

## 2018-12-20 RX ADMIN — Medication 50 MILLIEQUIVALENT(S): at 08:39

## 2018-12-20 RX ADMIN — Medication 4.52 MICROGRAM(S)/KG/MIN: at 07:20

## 2018-12-20 RX ADMIN — Medication 200 GRAM(S): at 17:54

## 2018-12-20 RX ADMIN — PIPERACILLIN AND TAZOBACTAM 200 GRAM(S): 4; .5 INJECTION, POWDER, LYOPHILIZED, FOR SOLUTION INTRAVENOUS at 19:35

## 2018-12-20 RX ADMIN — Medication 5.65 MICROGRAM(S)/KG/MIN: at 03:59

## 2018-12-20 RX ADMIN — Medication 3 MILLILITER(S): at 02:55

## 2018-12-20 RX ADMIN — Medication 125 MILLILITER(S): at 19:18

## 2018-12-20 RX ADMIN — MILRINONE LACTATE 4.52 MICROGRAM(S)/KG/MIN: 1 INJECTION, SOLUTION INTRAVENOUS at 23:29

## 2018-12-20 RX ADMIN — Medication 75 MILLIGRAM(S): at 23:28

## 2018-12-20 RX ADMIN — EPINEPHRINE 9.04 MICROGRAM(S)/KG/MIN: 0.3 INJECTION INTRAMUSCULAR; SUBCUTANEOUS at 19:18

## 2018-12-20 RX ADMIN — MILRINONE LACTATE 4.52 MICROGRAM(S)/KG/MIN: 1 INJECTION, SOLUTION INTRAVENOUS at 18:02

## 2018-12-20 RX ADMIN — Medication 2.83 MICROGRAM(S)/KG/MIN: at 19:13

## 2018-12-20 RX ADMIN — Medication 2.83 MICROGRAM(S)/KG/MIN: at 08:32

## 2018-12-20 RX ADMIN — PANTOPRAZOLE SODIUM 10 MG/HR: 20 TABLET, DELAYED RELEASE ORAL at 23:29

## 2018-12-20 RX ADMIN — Medication 50 MILLILITER(S): at 08:08

## 2018-12-20 RX ADMIN — VASOPRESSIN 0.6 UNIT(S)/MIN: 20 INJECTION INTRAVENOUS at 19:14

## 2018-12-20 RX ADMIN — INSULIN HUMAN 2 UNIT(S)/HR: 100 INJECTION, SOLUTION SUBCUTANEOUS at 23:29

## 2018-12-20 RX ADMIN — Medication 125 MILLILITER(S): at 14:00

## 2018-12-20 RX ADMIN — Medication 40 MILLIGRAM(S): at 03:47

## 2018-12-20 RX ADMIN — Medication 125 MILLILITER(S): at 19:45

## 2018-12-20 RX ADMIN — PANTOPRAZOLE SODIUM 40 MILLIGRAM(S): 20 TABLET, DELAYED RELEASE ORAL at 17:09

## 2018-12-20 RX ADMIN — Medication 50 MILLIEQUIVALENT(S): at 07:15

## 2018-12-20 RX ADMIN — Medication 125 MILLILITER(S): at 23:00

## 2018-12-20 RX ADMIN — Medication 125 MILLILITER(S): at 22:30

## 2018-12-20 RX ADMIN — Medication 50 MILLIGRAM(S): at 06:56

## 2018-12-20 RX ADMIN — Medication 100 MILLIGRAM(S): at 07:01

## 2018-12-20 RX ADMIN — Medication 50 MILLIEQUIVALENT(S): at 08:40

## 2018-12-20 RX ADMIN — PIPERACILLIN AND TAZOBACTAM 200 GRAM(S): 4; .5 INJECTION, POWDER, LYOPHILIZED, FOR SOLUTION INTRAVENOUS at 06:56

## 2018-12-20 NOTE — PROGRESS NOTE ADULT - SUBJECTIVE AND OBJECTIVE BOX
CTICU  CRITICAL  CARE  attending     Hand off received 					   Pertinent clinical, laboratory, radiographic, hemodynamic, echocardiographic, respiratory data, microbiologic data and chart were reviewed and analyzed frequently throughout the course of the day and night  Patient seen and examined with CTS/ SH attending at bedside    Pt is a 78y , Female, s/p emergent MVR for acute MR with cardiogenic shock;  h/o recent NSTEMI  HLOA prior to OR      Hemodynamic instability  admitted with multiple inotropes/pressors  IABP placed preop  lactic acidosis  Anupam      Hemoptysis: Hemoptysis  Pneumonia: Pneumonia      , FAMILY HISTORY:  No pertinent family history in first degree relatives  PAST MEDICAL & SURGICAL HISTORY:  Asthma, unspecified asthma severity, unspecified whether complicated, unspecified whether persistent  High cholesterol  Hypertension    Patient is a 78y old  Female who presents with a chief complaint of NSTEMI (20 Dec 2018 13:34)      14 system review was unremarkable  acute changes include acute respiratory failure  Vital signs, hemodynamic and respiratory parameters were reviewed from the bedside nursing flowsheet.  ICU Vital Signs Last 24 Hrs  T(C): 36.6 (20 Dec 2018 06:00), Max: 37.1 (20 Dec 2018 00:52)  T(F): 97.8 (20 Dec 2018 06:00), Max: 98.8 (20 Dec 2018 00:52)  HR: 64 (20 Dec 2018 17:00) (64 - 120)  BP: 73/52 (20 Dec 2018 05:00) (59/29 - 124/83)  BP(mean): 64 (20 Dec 2018 14:30) (28 - 108)  ABP: 148/38 (20 Dec 2018 17:00) (56/34 - 164/43)  ABP(mean): 84 (20 Dec 2018 17:00) (42 - 99)  RR: 14 (20 Dec 2018 17:00) (14 - 41)  SpO2: 100% (20 Dec 2018 17:00) (73% - 100%)    Adult Advanced Hemodynamics Last 24 Hrs  CVP(mm Hg): --  CVP(cm H2O): --  CO: --  CI: --  PA: --  PA(mean): --  PCWP: --  SVR: --  SVRI: --  PVR: --  PVRI: --, ABG - ( 20 Dec 2018 15:08 )  pH, Arterial: 7.44  pH, Blood: x     /  pCO2: 30    /  pO2: 117   / HCO3: 20    / Base Excess: -3.7  /  SaO2: 98                Mode: AC/ CMV (Assist Control/ Continuous Mandatory Ventilation)  RR (machine): 16  TV (machine): 450  FiO2: 100  PEEP: 8  ITime: 1  MAP: 12  PIP: 23    Intake and output was reviewed and the fluid balance was calculated  Daily     Daily   I&O's Summary    19 Dec 2018 07:01  -  20 Dec 2018 07:00  --------------------------------------------------------  IN: 1775.4 mL / OUT: 270 mL / NET: 1505.4 mL    20 Dec 2018 07:01  -  20 Dec 2018 17:05  --------------------------------------------------------  IN: 406.5 mL / OUT: 1085 mL / NET: -678.5 mL        All lines and drain sites were assessed  Glycemic trend was reviewedCAPILLARY BLOOD GLUCOSE        No acute change in mental status  Auscultation of the chest reveals equal bs  Abdomen is soft  Extremities are warm and well perfused  Wounds appear clean and unremarkable  Antibiotics are periop    labs  CBC Full  -  ( 20 Dec 2018 14:13 )  WBC Count : 13.2 K/uL  Hemoglobin : 11.4 g/dL  Hematocrit : 33.5 %  Platelet Count - Automated : 62 K/uL  Mean Cell Volume : 88.6 fL  Mean Cell Hemoglobin : 30.2 pg  Mean Cell Hemoglobin Concentration : 34.0 g/dL  Auto Neutrophil # : x  Auto Lymphocyte # : x  Auto Monocyte # : x  Auto Eosinophil # : x  Auto Basophil # : x  Auto Neutrophil % : x  Auto Lymphocyte % : x  Auto Monocyte % : x  Auto Eosinophil % : x  Auto Basophil % : x    12-20    147<H>  |  108  |  25<H>  ----------------------------<  122<H>  4.2   |  18<L>  |  1.41<H>    Ca    8.7      20 Dec 2018 14:13  Mg     2.5     12-20    TPro  4.2<L>  /  Alb  2.0<L>  /  TBili  1.3<H>  /  DBili  x   /  AST  2205<H>  /  ALT  1711<H>  /  AlkPhos  112  12-20    PT/INR - ( 20 Dec 2018 14:13 )   PT: 27.4 sec;   INR: 2.36          PTT - ( 20 Dec 2018 14:13 )  PTT:47.1 sec  The current medications were reviewed   MEDICATIONS  (STANDING):  atorvastatin 80 milliGRAM(s) Oral at bedtime  buDESOnide 160 MICROgram(s)/formoterol 4.5 MICROgram(s) Inhaler 2 Puff(s) Inhalation two times a day  calcium gluconate IVPB 2 Gram(s) IV Intermittent once  dextrose 50% Injectable 50 milliLiter(s) IV Push every 15 minutes  DOBUTamine Infusion 2.5 MICROgram(s)/kG/Min (4.522 mL/Hr) IV Continuous <Continuous>  heparin  Injectable 5000 Unit(s) SubCutaneous every 8 hours  insulin Infusion 2 Unit(s)/Hr (2 mL/Hr) IV Continuous <Continuous>  montelukast 10 milliGRAM(s) Oral daily  norepinephrine Infusion 0.05 MICROgram(s)/kG/Min (2.827 mL/Hr) IV Continuous <Continuous>  pantoprazole  Injectable 40 milliGRAM(s) IV Push daily  piperacillin/tazobactam IVPB. 2.25 Gram(s) IV Intermittent every 6 hours  sodium chloride 0.9%. 1000 milliLiter(s) (10 mL/Hr) IV Continuous <Continuous>  vancomycin  IVPB 1000 milliGRAM(s) IV Intermittent every 24 hours  vasopressin Infusion 0.01 Unit(s)/Min (0.6 mL/Hr) IV Continuous <Continuous>    MEDICATIONS  (PRN):  ALBUTerol/ipratropium for Nebulization 3 milliLiter(s) Nebulizer every 6 hours PRN Shortness of Breath and/or Wheezing       PROBLEM LIST/ ASSESSMENT:  HEALTH ISSUES - PROBLEM Dx:    Hemodynamic instability  lactic acidosis  s/p MVR  Hemoptysis: Hemoptysis  Pneumonia: Pneumonia      ,   Patient is a 78y old  Female who presents with a chief complaint of NSTEMI (20 Dec 2018 13:34)     s/p MVR ( emergent for acute MR)      My plan includes :  close hemodynamic, ventilatory and drain monitoring and management per post op routine    Monitor for arrhythmias and monitor parameters for organ perfusion  monitor neurologic status  Head of the bed should remain elevated to 45 deg .   chest PT and IS will be encouraged  monitor adequacy of oxygenation and ventilation and attempt to wean oxygen  Nutritional goals will be met using po eventually , ensure adequate caloric intake and montior the same  Stress ulcer and VTE prophylaxis will be achieved    Glycemic control is satisfactory  Electrolytes have been repleted as necessary and wound care has been carried out. Pain control has been achieved.   agressive physical therapy and early mobility and ambulation goals will be met   The family was updated about the course and plan  CRITICAL CARE TIME SPENT in evaluation and management, reassessments, review and interpretation of labs and x-rays, ventilator and hemodynamic management, formulating a plan and coordinating care: ___90____ MIN.  Time does not include procedural time.  CTICU ATTENDING     					    James Londono MD

## 2018-12-20 NOTE — PROGRESS NOTE ADULT - SUBJECTIVE AND OBJECTIVE BOX
INFECTIOUS DISEASE CONSULT PROGRESS NOTE    INTERVAL HPI/OVERNIGHT EVENTS:  Patient went into Cardiogenic Shock in the setting of NSTEMI w/ e/o mitral valve papillary muscle rupture on ECHO. Patient with minimal improvement on pressors and ionotrope. Underwent mitral valve repair in OR this morning with CT Surgery, returned with b/l mediastinal drains after estimated 1L blood loss.       ACTIVE ANTIMICROBIALS/ANTIBIOTICS:  piperacillin/tazobactam IVPB. 2.25 Gram(s) IV Intermittent every 6 hours  vancomycin  IVPB 1000 milliGRAM(s) IV Intermittent every 24 hours    VITAL SIGNS:  T(C): 36.6 (20 Dec 2018 06:00), Max: 37.8 (19 Dec 2018 15:00)  T(F): 97.8 (20 Dec 2018 06:00), Max: 100 (19 Dec 2018 15:00)  HR: 74 (20 Dec 2018 08:40) (72 - 120)  BP: 73/52 (20 Dec 2018 05:00) (59/29 - 124/83)  BP(mean): 66 (20 Dec 2018 05:00) (28 - 108)  ABP: 78/50 (20 Dec 2018 08:00) (56/34 - 84/62)  ABP(mean): 58 (20 Dec 2018 08:00) (42 - 70)  RR: 30 (20 Dec 2018 08:40) (16 - 41)  SpO2: 100% (20 Dec 2018 08:40) (73% - 100%)      PHYSICAL EXAM:  Constitutional: WDWN  Head: NC/AT  Eyes: PERRL, anicteric sclera  ENMT: no rhinorrhea; clear oropharynx; MMM  Neck: supple  Respiratory: CTA B/L  Cardiovascular: +S1/S2, RRR  Gastrointestinal: soft, NT/ND; intact BS  Extremities: WWP; no clubbing, cyanosis, or edema  Vascular: 2+ radial, DP/PT pulses B/L  Dermatologic: skin warm and dry; no visible rashes or lesions  Neurologic: AAOx3    LABS:                        10.5   13.1  )-----------( 112      ( 20 Dec 2018 08:28 )             33.6       12-20    137  |  109<H>  |  27<H>  ----------------------------<  453<HH>  5.8<H>   |  13<L>  |  1.63<H>    Ca    7.1<L>      20 Dec 2018 08:28  Mg     2.5         TPro  4.2<L>  /  Alb  2.0<L>  /  TBili  1.3<H>  /  DBili  x   /  AST  2205<H>  /  ALT  1711<H>  /  AlkPhos  112  -    Urinalysis Basic - ( 20 Dec 2018 04:08 )    Color: Yellow / Appearance: Clear / S.020 / pH: x  Gluc: x / Ketone: NEGATIVE  / Bili: Negative / Urobili: 0.2 E.U./dL   Blood: x / Protein: NEGATIVE mg/dL / Nitrite: NEGATIVE   Leuk Esterase: NEGATIVE / RBC: < 5 /HPF / WBC < 5 /HPF   Sq Epi: x / Non Sq Epi: 0-5 /HPF / Bacteria: Present /HPF    Lactate, Blood: 6.9 mmoL/L (18 @ 13:01)  Lactate, Blood: 6.1 mmoL/L (18 @ 08:27)  Lactate, Blood: 6.5 mmoL/L (18 @ 05:45)      MICROBIOLOGY:      RADIOLOGY & ADDITIONAL STUDIES: INFECTIOUS DISEASE CONSULT PROGRESS NOTE    INTERVAL HPI/OVERNIGHT EVENTS:  Patient went into Cardiogenic Shock in the setting of NSTEMI w/ e/o mitral valve papillary muscle rupture on ECHO. Patient with minimal improvement on pressors and ionotrope. Underwent mitral valve repair and IABP placement given cardiogenic shock in OR this morning with CT Surgery, returned with b/l mediastinal drains after estimated 1L blood loss, w/ active transfusion of 6u PRBC. Patient is intubated, sedated - on Levophed, Vasopressin - Milrinone being started in the evening.     Reports recovered from [facility] with confirmation on tissue sample of Aspergillus. To discuss with Pulmonology regarding anti-fungal therapy.      ACTIVE ANTIMICROBIALS/ANTIBIOTICS:  piperacillin/tazobactam IVPB. 2.25 Gram(s) IV Intermittent every 6 hours  vancomycin  IVPB 1000 milliGRAM(s) IV Intermittent every 24 hours    VITAL SIGNS:  T(C): 36.6 (20 Dec 2018 06:00), Max: 37.8 (19 Dec 2018 15:00)  T(F): 97.8 (20 Dec 2018 06:00), Max: 100 (19 Dec 2018 15:00)  HR: 74 (20 Dec 2018 08:40) (72 - 120)  BP: 73/52 (20 Dec 2018 05:00) (59/29 - 124/83)  BP(mean): 66 (20 Dec 2018 05:00) (28 - 108)  ABP: 78/50 (20 Dec 2018 08:00) (56/34 - 84/62)  ABP(mean): 58 (20 Dec 2018 08:00) (42 - 70)  RR: 30 (20 Dec 2018 08:40) (16 - 41)  SpO2: 100% (20 Dec 2018 08:40) (73% - 100%)      PHYSICAL EXAM:  Constitutional: Patient is intubated and sedated.   Head: NC/AT  Eyes: PERRL, anicteric sclera  ENMT: Edematous, bruised tongue.   Neck: No LAD  Respiratory: Coarse breath sounds b/l - no e/o wheeze   Cardiovascular: Loud S1 S2 likely amplified by presence of IABP   Gastrointestinal: 3 Mediastinal/pleural drains, draining blood. via MAURICE drains.   Extremities: Cool to touch. Non-edematous   Vascular: 2+ PT B/l, DP difficult to appreciate.   Dermatologic: skin warm and dry; no visible rashes or lesions  Neurologic: Sedated. No obvious cranial nerve deficit on observation.     LABS:                        10.5   13.1  )-----------( 112      ( 20 Dec 2018 08:28 )             33.6           137  |  109<H>  |  27<H>  ----------------------------<  453<HH>  5.8<H>   |  13<L>  |  1.63<H>    Ca    7.1<L>      20 Dec 2018 08:28  Mg     2.5         TPro  4.2<L>  /  Alb  2.0<L>  /  TBili  1.3<H>  /  DBili  x   /  AST  2205<H>  /  ALT  1711<H>  /  AlkPhos  112      Urinalysis Basic - ( 20 Dec 2018 04:08 )    Color: Yellow / Appearance: Clear / S.020 / pH: x  Gluc: x / Ketone: NEGATIVE  / Bili: Negative / Urobili: 0.2 E.U./dL   Blood: x / Protein: NEGATIVE mg/dL / Nitrite: NEGATIVE   Leuk Esterase: NEGATIVE / RBC: < 5 /HPF / WBC < 5 /HPF   Sq Epi: x / Non Sq Epi: 0-5 /HPF / Bacteria: Present /HPF    Lactate, Blood: 6.9 mmoL/L (18 @ 13:01)  Lactate, Blood: 6.1 mmoL/L (18 @ 08:27)  Lactate, Blood: 6.5 mmoL/L (18 @ 05:45)      MICROBIOLOGY:  Pending.     RADIOLOGY & ADDITIONAL STUDIES:  < from: Xray Chest 1 View- PORTABLE-Urgent (18 @ 07:21) >  FINDINGS: Appropriate positioning right IJ central venous catheter, ETT   and enteric tube. The cardiovascular silhouette is unchanged. No change   in severe pulmonary edema. No pleural effusions.

## 2018-12-20 NOTE — PROGRESS NOTE ADULT - SUBJECTIVE AND OBJECTIVE BOX
Surgeon: Gary    Requesting Physician: Soy    HISTORY OF PRESENT ILLNESS:  79 y/o female with hx asthma, HLD, GERD, SVG, MVP, Osteoporosis, Lung Nodule, Necrotizing Pneumonia s/p biopsy showing aspergillosis who was transferred from [facility] with NSTEMI.  Patient was admitted to [facility] with hemoptysis and cough.  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  Patient switched to Voriconazole.  Patient became unstable at [facility], Troponins drawn and peaked at 50.  Patient had no EKG changes at that time.  Prior to transfer patient did have 1 intermittent episode of A.Fib which converted with initiation of Cardizem.  Patient did remain hemodynamically stable.  Patient was then transferred to [facility] under care of CCU.      Patient arrived to CCU.  Arterial line placed.  Patient decompensated acutely overnight 18 into 18.  Patient required high doses of vasopressors and inotropes.  Patient was emergently intubated.  Bedside echocardiogram showed severe MR and acute rupture of papillary muscle.  Cardiac surgery emergently consulted for evaluation.      On initial evaluation, patient intubated, ABG 7.15/33/75/11.  O2 sat 80.  Patient requiring Vasopressin 0.06, Norepinephrine 1.6 mcg/kg/min, Dobutamine 5 and systolic blood pressure 80mmHg.  Patient not responding hemodynamically to high dose pressors.  Patient lactate 6.5.      Patient taken emergently to cath lab for emergent cath and IABP placement.        PAST MEDICAL & SURGICAL HISTORY:  Asthma, unspecified asthma severity, unspecified whether complicated, unspecified whether persistent  High cholesterol  Hypertension      MEDICATIONS  (STANDING):  aspirin enteric coated 81 milliGRAM(s) Oral daily  atorvastatin 80 milliGRAM(s) Oral at bedtime  buDESOnide 160 MICROgram(s)/formoterol 4.5 MICROgram(s) Inhaler 2 Puff(s) Inhalation two times a day  clopidogrel Tablet 75 milliGRAM(s) Oral daily  DOBUTamine Infusion 2.5 MICROgram(s)/kG/Min (4.522 mL/Hr) IV Continuous <Continuous>  heparin  Infusion 1000 Unit(s)/Hr (10 mL/Hr) IV Continuous <Continuous>  montelukast 10 milliGRAM(s) Oral daily  norepinephrine Infusion 0.05 MICROgram(s)/kG/Min (2.827 mL/Hr) IV Continuous <Continuous>  pantoprazole    Tablet 40 milliGRAM(s) Oral before breakfast  piperacillin/tazobactam IVPB. 2.25 Gram(s) IV Intermittent every 6 hours  propofol Infusion 5 MICROgram(s)/kG/Min (1.809 mL/Hr) IV Continuous <Continuous>  vancomycin  IVPB 1000 milliGRAM(s) IV Intermittent every 24 hours  vasopressin Infusion 0.01 Unit(s)/Min (0.6 mL/Hr) IV Continuous <Continuous>    MEDICATIONS  (PRN):  ALBUTerol/ipratropium for Nebulization 3 milliLiter(s) Nebulizer every 6 hours PRN Shortness of Breath and/or Wheezing  HYDROcodone/homatropine Syrup 5 milliLiter(s) Oral every 4 hours PRN Cough      Allergies    No Known Allergies    Intolerances        SOCIAL HISTORY:  Unable to obtain as patient intubated/sedated.    Ex  is healthcare proxy    FAMILY HISTORY:  No pertinent family history in first degree relatives      Review of Systems  Unable to obtain as patient intubated/sedated     PHYSICAL EXAM  Vital Signs Last 24 Hrs  T(C): 36.6 (20 Dec 2018 06:00), Max: 37.8 (19 Dec 2018 15:00)  T(F): 97.8 (20 Dec 2018 06:00), Max: 100 (19 Dec 2018 15:00)  HR: 92 (20 Dec 2018 08:00) (72 - 120)  BP: 73/52 (20 Dec 2018 05:00) (59/29 - 124/83)  BP(mean): 66 (20 Dec 2018 05:00) (28 - 108)  RR: 30 (20 Dec 2018 08:00) (16 - 41)  SpO2: 96% (20 Dec 2018 07:00) (73% - 100%)    CONSTITUTIONAL:                                                                          intubated/sedated   NEURO:                                                                                            intubated/sedated                       EYES:                                                                                                 PERRL  ENMT:                                                                                                WNL  CV:                                                                                                     RRR +HIREN L sternal border   RESPIRATORY:                                                                                 coarse breath sounds throughout   GI:                                                                                                       soft, non-tender   : TAYLOR + / -                                                                                 WNL  MUSKULOSKELETAL:                                                                       WNL  SKIN / BREAST:                                                                                 WNL                                                          LABS:                        10.5   13.1  )-----------( 112      ( 20 Dec 2018 08:28 )             33.6     12-20    139  |  112<H>  |  26<H>  ----------------------------<  134<H>  6.1<H>   |  11<L>  |  1.51<H>    Ca    6.4<LL>      20 Dec 2018 05:43  Mg     2.5     1220    TPro  4.6<L>  /  Alb  2.2<L>  /  TBili  1.2  /  DBili  0.4<H>  /  AST  698<H>  /  ALT  547<H>  /  AlkPhos  105  12-20    PT/INR - ( 19 Dec 2018 17:09 )   PT: 15.1 sec;   INR: 1.32          PTT - ( 20 Dec 2018 05:43 )  PTT:87.0 sec  Urinalysis Basic - ( 20 Dec 2018 04:08 )    Color: Yellow / Appearance: Clear / S.020 / pH: x  Gluc: x / Ketone: NEGATIVE  / Bili: Negative / Urobili: 0.2 E.U./dL   Blood: x / Protein: NEGATIVE mg/dL / Nitrite: NEGATIVE   Leuk Esterase: NEGATIVE / RBC: < 5 /HPF / WBC < 5 /HPF   Sq Epi: x / Non Sq Epi: 0-5 /HPF / Bacteria: Present /HPF      CARDIAC MARKERS ( 20 Dec 2018 05:43 )  x     / x     / 450 U/L / x     / 8.8 ng/mL  CARDIAC MARKERS ( 19 Dec 2018 22:48 )  x     / 2.42 ng/mL / 559 U/L / x     / 12.2 ng/mL  CARDIAC MARKERS ( 19 Dec 2018 17:09 )  x     / 2.67 ng/mL / 724 U/L / x     / 17.8 ng/mL          RADIOLOGY & ADDITIONAL STUDIES:  CAROTID U/S:    CXR:    CT Scan:    EKG:    TTE / BRIAN:    Cardiac Cath:

## 2018-12-20 NOTE — PROVIDER CONTACT NOTE (OTHER) - SITUATION
Pt complains of difficulty of breathing and shortness of breath. Pt started desaturating to high 80s. Dr Ansari called at bedside. Nebs treatment given as ordered. Xray done. BP starts trending down.
pt had BM and when cleaning, noticed bloody stool.

## 2018-12-20 NOTE — PROVIDER CONTACT NOTE (CRITICAL VALUE NOTIFICATION) - DATE AND TIME:
20-Dec-2018 07:36 NEPHROLOGY INTERVAL HPI/OVERNIGHT EVENTS:  comfortably resting.  SOB after startled.  uncomfortable with position placed    HPI:  This patient is a 69 year old male with PMH significant for:                     Hodgkings Disease treated with Radiation and RT                     CAD - s/p cath in , cabg x 4                     chronic pleural effusion Right chest(s/p VATS 2016)                     aspiration Pneumonia 2016,                      ESRD on Dialysis since 2016                     Rash to vanco, or zoloft on steroid taper                     chronic vent dependence                     hypothyroidism  Today in nursing home developed increased HR with cyanosis sat ok  In ED there was high peak pressures, trach may have been positional but wbc has increased to 30, was [facility] at [facility] (31 Aug 2017 16:24)      Patient is a 68y old  Male who presents with a chief complaint of sob, difficulty ventilating (31 Aug 2017 16:24)      MEDICATIONS  (STANDING):  metoprolol 12.5 milliGRAM(s) Oral two times a day  levothyroxine 150 MICROGram(s) Oral daily  heparin  Injectable 5000 Unit(s) SubCutaneous every 12 hours  predniSONE   Tablet 50 milliGRAM(s) Oral daily  DAPTOmycin IVPB 460 milliGRAM(s) IV Intermittent every 48 hours  DAPTOmycin IVPB   IV Intermittent   omeprazole Suspension 40 milliGRAM(s) Oral daily  chlorhexidine 0.12% Liquid 5 milliLiter(s) Swish and Spit two times a day  multivitamin   Solution 5 milliLiter(s) Oral daily  torsemide 40 milliGRAM(s) Oral two times a day  epoetin ramón Injectable 4000 Unit(s) IV Push <User Schedule>  ipratropium 17 MICROgram(s) HFA Inhaler 1 Puff(s) Inhalation every 4 hours    MEDICATIONS  (PRN):  diphenhydrAMINE   Injectable 25 milliGRAM(s) IV Push every 6 hours PRN Rash and/or Itching  ALPRAZolam 0.5 milliGRAM(s) Oral at bedtime PRN sleeep  oxyCODONE    5 mG/acetaminophen 325 mG 1 Tablet(s) Oral every 6 hours PRN Moderate Pain (4 - 6)  polyethylene glycol 3350 17 Gram(s) Oral daily PRN Constipation  ALBUTerol    90 MICROgram(s) HFA Inhaler 2 Puff(s) Inhalation every 6 hours PRN Bronchospasm      Allergies    epinephrine (Unknown)  fentanyl (Unknown)  Reglan (Unknown)  Vancomycin Hydrochloride (Rash)  Zoloft (Unknown)    Intolerances        I&O's Detail    14 Sep 2017 07:01  -  15 Sep 2017 07:00  --------------------------------------------------------  IN:    Vital HN: 480 mL  Total IN: 480 mL    OUT:    PEG (Percutaneous Endoscopic Gastrostomy) Tube: 950 mL    Voided: 750 mL  Total OUT: 1700 mL    Total NET: -1220 mL      15 Sep 2017 07:01  -  15 Sep 2017 12:58  --------------------------------------------------------  IN:  Total IN: 0 mL    OUT:    Voided: 200 mL  Total OUT: 200 mL    Total NET: -200 mL    Vital Signs Last 24 Hrs  T(C): 36.8 (15 Sep 2017 10:12), Max: 36.8 (15 Sep 2017 10:12)  T(F): 98.2 (15 Sep 2017 10:12), Max: 98.2 (15 Sep 2017 10:12)  HR: 95 (15 Sep 2017 12:33) (75 - 114)  BP: 97/59 (15 Sep 2017 12:33) (97/59 - 131/85)  BP(mean): 49 (15 Sep 2017 10:00) (49 - 95)  RR: 11 (15 Sep 2017 12:33) (0 - 34)  SpO2: 100% (15 Sep 2017 10:58) (98% - 100%)  Daily     Daily Weight in k (15 Sep 2017 03:01)    PHYSICAL EXAM:  General: alert. awake Ox3  HEENT: MMM  CV: s1s2 rrr  LUNGS: B/L CTA  EXT: trace edema    LABS:                        9.6    11.1  )-----------( 347      ( 15 Sep 2017 05:30 )             29.4     09-15    135  |  91<L>  |  97<H>  ----------------------------<  83  4.3   |  34<H>  |  2.99<H>    Ca    8.7      15 Sep 2017 05:30  Phos  5.9     09-15    TPro  x   /  Alb  2.3<L>  /  TBili  x   /  DBili  x   /  AST  x   /  ALT  x   /  AlkPhos  x   09-15        Phosphorus Level, Serum: 5.9 mg/dL (09-15 @ 05:30)    seen at HD tolerating tx, revaclear with goal uf fo 850 ml  on 2k bath

## 2018-12-20 NOTE — BRIEF OPERATIVE NOTE - PRE-OP DX
Cardiogenic pulmonary edema  12/20/2018    Oneil Malik  Cardiogenic shock  12/20/2018    Oneil Malik  Mitral valve regurgitation  12/20/2018    Active  Oneil Mar  Ruptured papillary muscle complicating acute MI  12/20/2018    Oneil Malik

## 2018-12-20 NOTE — PROGRESS NOTE ADULT - ASSESSMENT
79 yo F w/ HTN, Asthma (not on steroids), HLD, GERD, SVT (on Flecainide), MVP, Osteoporosis, known Lung nodule (RUL, for 2 years) - who presents as a transfer from OSH for NSTEMI management. Patient presented initially to OSH for hemoptysis in the context of a Bronchoscopy performed 2 weeks prior that reportedly was positive on FNA/tissue biopsy for Aspergillus. This pulmonary workup was being performed for evaluation of the known lung nodule that had been found to be enlarging. During hospitalization, patient was started on anti-fungal treatment (Amphotericin initially, then switched to Voriconazole) given c/f invasive aspergillosus based on report of prior bronch, but treatments were halted 2/2 intolerance of side effects. Reports now obtained from [facility] indicating tissue confirmation of Aspergillus - indicating need for treatment of invasive aspergillus. Patient has no obvious risk factors for the development of an invasive aspergillosus (no chronic steroid use, immunocompromise). Would also persist with HCAP coverage at this time as it can not be excluded that patient has a bacterial pneumonia. Patient decompensated acutely, developing cardiogenic shock 2/2 ruptured papillary muscle in the context of w/u for NSTEMI. Patient emergently taken to OR for mitral valve repair, returned with IABP in place for CO support.     Suggest:  - C/w coverage for Bacterial Pneumonia - Zosyn 2.25 q6 and Vancomycin 1g q 24  - To discuss with Pulmonology regarding starting anti-fungal therapy for the treatment of invasive aspergillosus - concern present for the nephrotoxic nature of IV Voriconazole in a patient with likely ATN in the setting of cardiogenic shock and depleted forward flow for a number of hours, leading to probable ischemic renal injury. 79 yo F w/ HTN, Asthma (not on steroids), HLD, GERD, SVT (on Flecainide), MVP, Osteoporosis, known Lung nodule (RUL, for 2 years) - who presents as a transfer from OSH for NSTEMI management. Patient presented initially to OSH for hemoptysis in the context of a Bronchoscopy performed 2 weeks prior that reportedly was positive on FNA/tissue biopsy for Aspergillus. This pulmonary workup was being performed for evaluation of the known lung nodule that had been found to be enlarging. During hospitalization, patient was started on anti-fungal treatment (Amphotericin initially, then switched to Voriconazole) given c/f invasive aspergillosus based on report of prior bronch, but treatments were halted 2/2 intolerance of side effects. Reports now obtained from [facility] indicating tissue confirmation of Aspergillus - indicating need for treatment of invasive aspergillus. Patient has no obvious risk factors for the development of an invasive aspergillosus (no chronic steroid use, immunocompromise). Would also persist with HCAP coverage at this time as it can not be excluded that patient has a bacterial pneumonia. Patient decompensated acutely, developing cardiogenic shock 2/2 ruptured papillary muscle in the context of w/u for NSTEMI. Patient emergently taken to OR for mitral valve repair, returned with IABP in place for CO support.     Suggest:  - C/w coverage for Bacterial Pneumonia - Zosyn 2.25 q6 and Vancomycin 1g q 24  - To discuss with Pulmonology regarding starting anti-fungal therapy for the treatment of invasive aspergillosis - concern present for the many potential drug interactions of voriconazole in this very critically ill patient, as well as potential for dysrhythmia (QTc 0.47 prior to surgery this morning).  Will consider starting tomorrow if she stabilizes.  Micafungin is an alternative but much less effective.

## 2018-12-20 NOTE — BRIEF OPERATIVE NOTE - PROCEDURE
<<-----Click on this checkbox to enter Procedure Mitral valve replacement  12/20/2018    Active  DARYA Mitral valve replacement  12/20/2018    Active  Oneil Mar

## 2018-12-20 NOTE — BRIEF OPERATIVE NOTE - POST-OP DX
Cardiogenic pulmonary edema  12/20/2018    Active  MacoskeyOneil  Cardiogenic pulmonary edema  12/20/2018    Active  MacpoornimakeyOneil  Mitral regurgitation  12/20/2018    Active  MacoskeyOneil  Papillary muscle rupture  12/20/2018    Active  MacOneil lora

## 2018-12-20 NOTE — CHART NOTE - NSCHARTNOTEFT_GEN_A_CORE
This patient was admitted to CCU for NSTEMI, was increased for high WOB, her BP was low This patient was admitted to CCU for NSTEMI, was increased for high WOB, her BP was low, she was bolused IVF, arterial line placed, levophed increased, vasopressin increased, CVP 12, bicarb given for metabolic acidosis.  The patient was managed at bedside with the cardio team.    A/P  -shock, prob mixed component   -acute respiratory failure  >avoid sedative and opiates  >d/c propofol; may use versed for sedation  >decrease TV to 380ml, increase the RR  >give bicarb and f/u ABG  >MAP 65 to 70mmhg, c/w levophed  >CVP 10 to 12   >give steroids as she was induced with etomidate  >the cardiology fellows are placing a PA catheter  Continued management by cardiology and MICU assistance  D/w MICU fellow and cardiology fellows  CC time 50 mins.

## 2018-12-20 NOTE — PROGRESS NOTE ADULT - ASSESSMENT
77 y/o female with hx asthma, HLD, GERD, SVG, MVP, Osteoporosis, Lung Nodule, Necrotizing Pneumonia s/p biopsy showing aspergillosis who was transferred from [facility] with NSTEMI.  Patient was admitted to [facility] with hemoptysis and cough.  Patient had acute decompensation 12/19/18.  Patient hemodynamically unstable.      1.  Patient seen and assessed by Dr. Vega.  Due to the recent hemoptysis, respiratory failure, acute kidney injury, aspergillins, and current cardiogenic shock requiring Vasopressin, Norepinephrine, Dobutamine, patient has mortality of at least 60%.  Patient is not responding to Vasopressin, Norepinephrine, or Dobutamine and despite treatement with all agents, systolic blood pressure remains 80mmHg.  Dr. Vega and myself spoke with patient's healthcare proxy, her Ex-, Angel Reynolds.  He consented for emergent heart catheterization, intraaortic balloon pump placement, emergent cardiac surgery, mitral valve replacement, possible CABG, possible Impella, possible ECMO.  Patient's  understood risks and consented which is in chart.

## 2018-12-20 NOTE — CHART NOTE - NSCHARTNOTEFT_GEN_A_CORE
Called to bedside at approximately 3am 2/2 to patient complaining of experiencing shortness of breath. Patient's BP was at 59/23 and was saturating at about 88%. On physical exam patient had expiratory wheezes and was using accessory respiratory muscles. Patient was given 40mg of solumedrol and duoneb treatment. Patient was placed on 6L nasal cannula with out improvement in oxygen saturation. Patient w/ hx significant for necrotizing pna w/ hemoptysis so instead of BIPAP patient was sedated w/ etomidate and propofol and was intubated. Placement was confirmed w/ CXR. After intubation oxygen saturation weren't obtainable with pulse ox. Patient's blood pressure continued to be low so was started on levophed drip at 0.05mcg/kg/hr. Arterial line was placed in right femoral artery in right groin BP: 91/62 MAP: 72. Blood gas was obtained and s/f pH: 7.24 pCO2: 29 HCO3: 12 pO2: 66 and lactate: 6.5. Labs were obtained and s/f ........ Called to bedside at approximately 3am 2/2 to patient complaining of experiencing shortness of breath. Patient's BP was at 59/23 and was saturating at about 88%. On physical exam patient had expiratory wheezes and was using accessory respiratory muscles. Patient was given 40mg of solumedrol and duoneb treatment. Patient was placed on 6L nasal cannula with out improvement in oxygen saturation. Patient w/ hx significant for necrotizing pna w/ hemoptysis so instead of BIPAP patient was sedated w/ etomidate and propofol and was intubated. Placement was confirmed w/ CXR. After intubation oxygen saturation weren't obtainable with pulse ox. Patient's blood pressure continued to be low so was started on levophed drip at 0.05mcg/kg/hr. Arterial line was placed in right femoral artery in right groin BP: 91/62 MAP: 72. Blood gas was obtained and s/f pH: 7.24 pCO2: 29 HCO3: 12 pO2: 66 and lactate: 6.5. Labs were obtained and s/f Central line placed in right IJ. Called to bedside at approximately 3am 2/2 to patient complaining of experiencing shortness of breath. Patient's BP was at 59/23 and was desaturating in low 80s and high 70s. On physical exam patient had expiratory wheezes and was using accessory respiratory muscles. Patient was given 40mg of solumedrol and duoneb treatment. Patient was placed on 6L nasal cannula with out improvement in oxygen saturation. Patient w/ hx significant for necrotizing pna w/ hemoptysis so instead of BIPAP patient was sedated w/ etomidate and propofol and was intubated. Placement was confirmed w/ CXR. After intubation oxygen saturation weren't obtainable with pulse ox. Patient's blood pressure continued to be low so was started on levophed drip at 0.05mcg/kg/hr. Arterial line was placed in right femoral artery in right groin BP: 91/62 MAP: 72. Blood gas was obtained and s/f pH: 7.24 pCO2: 29 HCO3: 12 pO2: 66 and lactate: 6.5. Labs were obtained and central line placed in right IJ.

## 2018-12-21 DIAGNOSIS — N17.9 ACUTE KIDNEY FAILURE, UNSPECIFIED: ICD-10-CM

## 2018-12-21 DIAGNOSIS — R57.0 CARDIOGENIC SHOCK: ICD-10-CM

## 2018-12-21 DIAGNOSIS — A41.9 SEPSIS, UNSPECIFIED ORGANISM: ICD-10-CM

## 2018-12-21 LAB
ALBUMIN SERPL ELPH-MCNC: 2.8 G/DL — LOW (ref 3.3–5)
ALBUMIN SERPL ELPH-MCNC: 2.9 G/DL — LOW (ref 3.3–5)
ALBUMIN SERPL ELPH-MCNC: 3.2 G/DL — LOW (ref 3.3–5)
ALBUMIN SERPL ELPH-MCNC: 3.2 G/DL — LOW (ref 3.3–5)
ALP SERPL-CCNC: 111 U/L — SIGNIFICANT CHANGE UP (ref 40–120)
ALP SERPL-CCNC: 82 U/L — SIGNIFICANT CHANGE UP (ref 40–120)
ALP SERPL-CCNC: 89 U/L — SIGNIFICANT CHANGE UP (ref 40–120)
ALP SERPL-CCNC: 97 U/L — SIGNIFICANT CHANGE UP (ref 40–120)
ALT FLD-CCNC: 3850 U/L — HIGH (ref 10–45)
ALT FLD-CCNC: 3873 U/L — HIGH (ref 10–45)
ALT FLD-CCNC: 3948 U/L — HIGH (ref 10–45)
ALT FLD-CCNC: 4062 U/L — HIGH (ref 10–45)
ANION GAP SERPL CALC-SCNC: 18 MMOL/L — HIGH (ref 5–17)
ANION GAP SERPL CALC-SCNC: 20 MMOL/L — HIGH (ref 5–17)
ANION GAP SERPL CALC-SCNC: 21 MMOL/L — HIGH (ref 5–17)
ANION GAP SERPL CALC-SCNC: 22 MMOL/L — HIGH (ref 5–17)
APTT BLD: 33.8 SEC — SIGNIFICANT CHANGE UP (ref 27.5–36.3)
APTT BLD: 36.2 SEC — SIGNIFICANT CHANGE UP (ref 27.5–36.3)
APTT BLD: 37.4 SEC — HIGH (ref 27.5–36.3)
APTT BLD: 38.1 SEC — HIGH (ref 27.5–36.3)
APTT BLD: 49.8 SEC — HIGH (ref 27.5–36.3)
APTT BLD: 50.7 SEC — HIGH (ref 27.5–36.3)
AST SERPL-CCNC: >7000 U/L — HIGH (ref 10–40)
AST SERPL-CCNC: >7000 U/L — SIGNIFICANT CHANGE UP (ref 10–40)
BASE EXCESS BLDA CALC-SCNC: -3.8 MMOL/L — LOW (ref -2–3)
BASE EXCESS BLDA CALC-SCNC: -4 MMOL/L — LOW (ref -2–3)
BASE EXCESS BLDV CALC-SCNC: -12.1 MMOL/L — SIGNIFICANT CHANGE UP
BASE EXCESS BLDV CALC-SCNC: -3.2 MMOL/L — SIGNIFICANT CHANGE UP
BASE EXCESS BLDV CALC-SCNC: -3.8 MMOL/L — SIGNIFICANT CHANGE UP
BASE EXCESS BLDV CALC-SCNC: -4.3 MMOL/L — SIGNIFICANT CHANGE UP
BASE EXCESS BLDV CALC-SCNC: -5.4 MMOL/L — SIGNIFICANT CHANGE UP
BILIRUB SERPL-MCNC: 3.2 MG/DL — HIGH (ref 0.2–1.2)
BILIRUB SERPL-MCNC: 3.5 MG/DL — HIGH (ref 0.2–1.2)
BILIRUB SERPL-MCNC: 3.8 MG/DL — HIGH (ref 0.2–1.2)
BILIRUB SERPL-MCNC: 4.5 MG/DL — HIGH (ref 0.2–1.2)
BUN SERPL-MCNC: 32 MG/DL — HIGH (ref 7–23)
BUN SERPL-MCNC: 33 MG/DL — HIGH (ref 7–23)
BUN SERPL-MCNC: 34 MG/DL — HIGH (ref 7–23)
BUN SERPL-MCNC: 37 MG/DL — HIGH (ref 7–23)
CALCIUM SERPL-MCNC: 8.3 MG/DL — LOW (ref 8.4–10.5)
CALCIUM SERPL-MCNC: 8.5 MG/DL — SIGNIFICANT CHANGE UP (ref 8.4–10.5)
CALCIUM SERPL-MCNC: 8.7 MG/DL — SIGNIFICANT CHANGE UP (ref 8.4–10.5)
CALCIUM SERPL-MCNC: 8.9 MG/DL — SIGNIFICANT CHANGE UP (ref 8.4–10.5)
CHLORIDE SERPL-SCNC: 107 MMOL/L — SIGNIFICANT CHANGE UP (ref 96–108)
CHLORIDE SERPL-SCNC: 108 MMOL/L — SIGNIFICANT CHANGE UP (ref 96–108)
CO2 SERPL-SCNC: 17 MMOL/L — LOW (ref 22–31)
CO2 SERPL-SCNC: 18 MMOL/L — LOW (ref 22–31)
CREAT SERPL-MCNC: 1.74 MG/DL — HIGH (ref 0.5–1.3)
CREAT SERPL-MCNC: 1.85 MG/DL — HIGH (ref 0.5–1.3)
CREAT SERPL-MCNC: 1.98 MG/DL — HIGH (ref 0.5–1.3)
CREAT SERPL-MCNC: 2.13 MG/DL — HIGH (ref 0.5–1.3)
GAS PNL BLDA: SIGNIFICANT CHANGE UP
GAS PNL BLDV: SIGNIFICANT CHANGE UP
GLUCOSE BLDC GLUCOMTR-MCNC: 106 MG/DL — HIGH (ref 70–99)
GLUCOSE BLDC GLUCOMTR-MCNC: 122 MG/DL — HIGH (ref 70–99)
GLUCOSE BLDC GLUCOMTR-MCNC: 125 MG/DL — HIGH (ref 70–99)
GLUCOSE BLDC GLUCOMTR-MCNC: 131 MG/DL — HIGH (ref 70–99)
GLUCOSE BLDC GLUCOMTR-MCNC: 166 MG/DL — HIGH (ref 70–99)
GLUCOSE BLDC GLUCOMTR-MCNC: 169 MG/DL — HIGH (ref 70–99)
GLUCOSE BLDC GLUCOMTR-MCNC: 179 MG/DL — HIGH (ref 70–99)
GLUCOSE BLDC GLUCOMTR-MCNC: 181 MG/DL — HIGH (ref 70–99)
GLUCOSE BLDC GLUCOMTR-MCNC: 181 MG/DL — HIGH (ref 70–99)
GLUCOSE SERPL-MCNC: 131 MG/DL — HIGH (ref 70–99)
GLUCOSE SERPL-MCNC: 139 MG/DL — HIGH (ref 70–99)
GLUCOSE SERPL-MCNC: 160 MG/DL — HIGH (ref 70–99)
GLUCOSE SERPL-MCNC: 179 MG/DL — HIGH (ref 70–99)
HCO3 BLDA-SCNC: 18 MMOL/L — LOW (ref 21–28)
HCO3 BLDA-SCNC: 18 MMOL/L — LOW (ref 21–28)
HCO3 BLDV-SCNC: 18 MMOL/L — LOW (ref 20–27)
HCO3 BLDV-SCNC: 19 MMOL/L — LOW (ref 20–27)
HCO3 BLDV-SCNC: 19 MMOL/L — LOW (ref 20–27)
HCO3 BLDV-SCNC: 21 MMOL/L — SIGNIFICANT CHANGE UP (ref 20–27)
HCO3 BLDV-SCNC: 8 MMOL/L — CRITICAL LOW (ref 20–27)
HCT VFR BLD CALC: 24.8 % — LOW (ref 34.5–45)
HCT VFR BLD CALC: 26.4 % — LOW (ref 34.5–45)
HCT VFR BLD CALC: 28.5 % — LOW (ref 34.5–45)
HCT VFR BLD CALC: 28.9 % — LOW (ref 34.5–45)
HCT VFR BLD CALC: 30.8 % — LOW (ref 34.5–45)
HCT VFR BLD CALC: 32.1 % — LOW (ref 34.5–45)
HGB BLD-MCNC: 10.3 G/DL — LOW (ref 11.5–15.5)
HGB BLD-MCNC: 10.7 G/DL — LOW (ref 11.5–15.5)
HGB BLD-MCNC: 10.9 G/DL — LOW (ref 11.5–15.5)
HGB BLD-MCNC: 9.1 G/DL — LOW (ref 11.5–15.5)
HGB BLD-MCNC: 9.2 G/DL — LOW (ref 11.5–15.5)
HGB BLD-MCNC: 9.9 G/DL — LOW (ref 11.5–15.5)
INR BLD: 2.31 — HIGH (ref 0.88–1.16)
INR BLD: 2.38 — HIGH (ref 0.88–1.16)
INR BLD: 2.5 — HIGH (ref 0.88–1.16)
INR BLD: 2.51 — HIGH (ref 0.88–1.16)
INR BLD: 2.99 — HIGH (ref 0.88–1.16)
INR BLD: 3.47 — HIGH (ref 0.88–1.16)
LACTATE SERPL-SCNC: 4.7 MMOL/L — CRITICAL HIGH (ref 0.5–2)
LACTATE SERPL-SCNC: 4.8 MMOL/L — CRITICAL HIGH (ref 0.5–2)
LACTATE SERPL-SCNC: 5.4 MMOL/L — CRITICAL HIGH (ref 0.5–2)
LACTATE SERPL-SCNC: 6.8 MMOL/L — CRITICAL HIGH (ref 0.5–2)
LACTATE SERPL-SCNC: 7.8 MMOL/L — CRITICAL HIGH (ref 0.5–2)
LACTATE SERPL-SCNC: 7.9 MMOL/L — CRITICAL HIGH (ref 0.5–2)
MAGNESIUM SERPL-MCNC: 2.1 MG/DL — SIGNIFICANT CHANGE UP (ref 1.6–2.6)
MCHC RBC-ENTMCNC: 26.5 PG — LOW (ref 27–34)
MCHC RBC-ENTMCNC: 27.2 PG — SIGNIFICANT CHANGE UP (ref 27–34)
MCHC RBC-ENTMCNC: 27.3 PG — SIGNIFICANT CHANGE UP (ref 27–34)
MCHC RBC-ENTMCNC: 27.3 PG — SIGNIFICANT CHANGE UP (ref 27–34)
MCHC RBC-ENTMCNC: 28.2 PG — SIGNIFICANT CHANGE UP (ref 27–34)
MCHC RBC-ENTMCNC: 28.9 PG — SIGNIFICANT CHANGE UP (ref 27–34)
MCHC RBC-ENTMCNC: 34 G/DL — SIGNIFICANT CHANGE UP (ref 32–36)
MCHC RBC-ENTMCNC: 34.3 G/DL — SIGNIFICANT CHANGE UP (ref 32–36)
MCHC RBC-ENTMCNC: 34.5 G/DL — SIGNIFICANT CHANGE UP (ref 32–36)
MCHC RBC-ENTMCNC: 34.7 G/DL — SIGNIFICANT CHANGE UP (ref 32–36)
MCHC RBC-ENTMCNC: 36.1 G/DL — HIGH (ref 32–36)
MCHC RBC-ENTMCNC: 37.1 G/DL — HIGH (ref 32–36)
MCV RBC AUTO: 78 FL — LOW (ref 80–100)
MCV RBC AUTO: 78.1 FL — LOW (ref 80–100)
MCV RBC AUTO: 78.1 FL — LOW (ref 80–100)
MCV RBC AUTO: 78.2 FL — LOW (ref 80–100)
MCV RBC AUTO: 79.3 FL — LOW (ref 80–100)
MCV RBC AUTO: 79.6 FL — LOW (ref 80–100)
PCO2 BLDA: 24 MMHG — LOW (ref 32–45)
PCO2 BLDA: 24 MMHG — LOW (ref 32–45)
PCO2 BLDV: 12 MMHG — LOW (ref 41–51)
PCO2 BLDV: 28 MMHG — LOW (ref 41–51)
PCO2 BLDV: 29 MMHG — LOW (ref 41–51)
PCO2 BLDV: 29 MMHG — LOW (ref 41–51)
PCO2 BLDV: 34 MMHG — LOW (ref 41–51)
PH BLDA: 7.49 — HIGH (ref 7.35–7.45)
PH BLDA: 7.5 — HIGH (ref 7.35–7.45)
PH BLDV: 7.4 — SIGNIFICANT CHANGE UP (ref 7.32–7.43)
PH BLDV: 7.41 — SIGNIFICANT CHANGE UP (ref 7.32–7.43)
PH BLDV: 7.44 — HIGH (ref 7.32–7.43)
PH BLDV: 7.45 — HIGH (ref 7.32–7.43)
PH BLDV: 7.46 — HIGH (ref 7.32–7.43)
PHOSPHATE SERPL-MCNC: 4.4 MG/DL — SIGNIFICANT CHANGE UP (ref 2.5–4.5)
PHOSPHATE SERPL-MCNC: 4.5 MG/DL — SIGNIFICANT CHANGE UP (ref 2.5–4.5)
PHOSPHATE SERPL-MCNC: 4.8 MG/DL — HIGH (ref 2.5–4.5)
PHOSPHATE SERPL-MCNC: 5.5 MG/DL — HIGH (ref 2.5–4.5)
PLATELET # BLD AUTO: 101 K/UL — LOW (ref 150–400)
PLATELET # BLD AUTO: 58 K/UL — LOW (ref 150–400)
PLATELET # BLD AUTO: 60 K/UL — LOW (ref 150–400)
PLATELET # BLD AUTO: 68 K/UL — LOW (ref 150–400)
PLATELET # BLD AUTO: 69 K/UL — LOW (ref 150–400)
PLATELET # BLD AUTO: 83 K/UL — LOW (ref 150–400)
PO2 BLDA: 114 MMHG — HIGH (ref 83–108)
PO2 BLDA: 94 MMHG — SIGNIFICANT CHANGE UP (ref 83–108)
PO2 BLDV: 32 MMHG — SIGNIFICANT CHANGE UP
PO2 BLDV: 34 MMHG — SIGNIFICANT CHANGE UP
PO2 BLDV: 36 MMHG — SIGNIFICANT CHANGE UP
PO2 BLDV: 37 MMHG — SIGNIFICANT CHANGE UP
PO2 BLDV: 43 MMHG — SIGNIFICANT CHANGE UP
POTASSIUM SERPL-MCNC: 3.7 MMOL/L — SIGNIFICANT CHANGE UP (ref 3.5–5.3)
POTASSIUM SERPL-MCNC: 4.1 MMOL/L — SIGNIFICANT CHANGE UP (ref 3.5–5.3)
POTASSIUM SERPL-MCNC: 4.1 MMOL/L — SIGNIFICANT CHANGE UP (ref 3.5–5.3)
POTASSIUM SERPL-MCNC: 4.2 MMOL/L — SIGNIFICANT CHANGE UP (ref 3.5–5.3)
POTASSIUM SERPL-SCNC: 3.7 MMOL/L — SIGNIFICANT CHANGE UP (ref 3.5–5.3)
POTASSIUM SERPL-SCNC: 4.1 MMOL/L — SIGNIFICANT CHANGE UP (ref 3.5–5.3)
POTASSIUM SERPL-SCNC: 4.1 MMOL/L — SIGNIFICANT CHANGE UP (ref 3.5–5.3)
POTASSIUM SERPL-SCNC: 4.2 MMOL/L — SIGNIFICANT CHANGE UP (ref 3.5–5.3)
PROT SERPL-MCNC: 3.8 G/DL — LOW (ref 6–8.3)
PROT SERPL-MCNC: 3.9 G/DL — LOW (ref 6–8.3)
PROT SERPL-MCNC: 4.1 G/DL — LOW (ref 6–8.3)
PROT SERPL-MCNC: 4.2 G/DL — LOW (ref 6–8.3)
PROTHROM AB SERPL-ACNC: 26.8 SEC — HIGH (ref 10–12.9)
PROTHROM AB SERPL-ACNC: 27.6 SEC — HIGH (ref 10–12.9)
PROTHROM AB SERPL-ACNC: 29.1 SEC — HIGH (ref 10–12.9)
PROTHROM AB SERPL-ACNC: 29.2 SEC — HIGH (ref 10–12.9)
PROTHROM AB SERPL-ACNC: 35 SEC — HIGH (ref 10–12.9)
PROTHROM AB SERPL-ACNC: 40.8 SEC — HIGH (ref 10–12.9)
RBC # BLD: 3.18 M/UL — LOW (ref 3.8–5.2)
RBC # BLD: 3.33 M/UL — LOW (ref 3.8–5.2)
RBC # BLD: 3.63 M/UL — LOW (ref 3.8–5.2)
RBC # BLD: 3.65 M/UL — LOW (ref 3.8–5.2)
RBC # BLD: 3.94 M/UL — SIGNIFICANT CHANGE UP (ref 3.8–5.2)
RBC # BLD: 4.11 M/UL — SIGNIFICANT CHANGE UP (ref 3.8–5.2)
RBC # FLD: 22.2 % — HIGH (ref 10.3–16.9)
RBC # FLD: 22.4 % — HIGH (ref 10.3–16.9)
RBC # FLD: 22.5 % — HIGH (ref 10.3–16.9)
RBC # FLD: 22.6 % — HIGH (ref 10.3–16.9)
RBC # FLD: 23.1 % — HIGH (ref 10.3–16.9)
RBC # FLD: 24.4 % — HIGH (ref 10.3–16.9)
SAO2 % BLDA: 97 % — SIGNIFICANT CHANGE UP (ref 95–100)
SAO2 % BLDA: 98 % — SIGNIFICANT CHANGE UP (ref 95–100)
SAO2 % BLDV: 58 % — SIGNIFICANT CHANGE UP
SAO2 % BLDV: 65 % — SIGNIFICANT CHANGE UP
SAO2 % BLDV: 70 % — SIGNIFICANT CHANGE UP
SAO2 % BLDV: 73 % — SIGNIFICANT CHANGE UP
SODIUM SERPL-SCNC: 144 MMOL/L — SIGNIFICANT CHANGE UP (ref 135–145)
SODIUM SERPL-SCNC: 144 MMOL/L — SIGNIFICANT CHANGE UP (ref 135–145)
SODIUM SERPL-SCNC: 145 MMOL/L — SIGNIFICANT CHANGE UP (ref 135–145)
SODIUM SERPL-SCNC: 146 MMOL/L — HIGH (ref 135–145)
WBC # BLD: 11.8 K/UL — HIGH (ref 3.8–10.5)
WBC # BLD: 12.2 K/UL — HIGH (ref 3.8–10.5)
WBC # BLD: 12.2 K/UL — HIGH (ref 3.8–10.5)
WBC # BLD: 13.1 K/UL — HIGH (ref 3.8–10.5)
WBC # BLD: 14.8 K/UL — HIGH (ref 3.8–10.5)
WBC # BLD: 16.5 K/UL — HIGH (ref 3.8–10.5)
WBC # FLD AUTO: 11.8 K/UL — HIGH (ref 3.8–10.5)
WBC # FLD AUTO: 12.2 K/UL — HIGH (ref 3.8–10.5)
WBC # FLD AUTO: 12.2 K/UL — HIGH (ref 3.8–10.5)
WBC # FLD AUTO: 13.1 K/UL — HIGH (ref 3.8–10.5)
WBC # FLD AUTO: 14.8 K/UL — HIGH (ref 3.8–10.5)
WBC # FLD AUTO: 16.5 K/UL — HIGH (ref 3.8–10.5)

## 2018-12-21 PROCEDURE — 93503 INSERT/PLACE HEART CATHETER: CPT

## 2018-12-21 PROCEDURE — 71045 X-RAY EXAM CHEST 1 VIEW: CPT | Mod: 26,76

## 2018-12-21 PROCEDURE — 99233 SBSQ HOSP IP/OBS HIGH 50: CPT | Mod: GC

## 2018-12-21 PROCEDURE — 71045 X-RAY EXAM CHEST 1 VIEW: CPT | Mod: 26,77

## 2018-12-21 PROCEDURE — 76937 US GUIDE VASCULAR ACCESS: CPT | Mod: 26

## 2018-12-21 PROCEDURE — 32551 INSERTION OF CHEST TUBE: CPT

## 2018-12-21 PROCEDURE — 99292 CRITICAL CARE ADDL 30 MIN: CPT | Mod: 25

## 2018-12-21 PROCEDURE — 99291 CRITICAL CARE FIRST HOUR: CPT | Mod: 25

## 2018-12-21 PROCEDURE — 36556 INSERT NON-TUNNEL CV CATH: CPT | Mod: 59

## 2018-12-21 PROCEDURE — 99232 SBSQ HOSP IP/OBS MODERATE 35: CPT

## 2018-12-21 RX ORDER — FUROSEMIDE 40 MG
20 TABLET ORAL ONCE
Qty: 0 | Refills: 0 | Status: COMPLETED | OUTPATIENT
Start: 2018-12-21 | End: 2018-12-21

## 2018-12-21 RX ORDER — FUROSEMIDE 40 MG
100 TABLET ORAL ONCE
Qty: 0 | Refills: 0 | Status: COMPLETED | OUTPATIENT
Start: 2018-12-21 | End: 2018-12-21

## 2018-12-21 RX ORDER — ALBUMIN HUMAN 25 %
250 VIAL (ML) INTRAVENOUS ONCE
Qty: 0 | Refills: 0 | Status: COMPLETED | OUTPATIENT
Start: 2018-12-21 | End: 2018-12-21

## 2018-12-21 RX ORDER — MICAFUNGIN SODIUM 100 MG/1
100 INJECTION, POWDER, LYOPHILIZED, FOR SOLUTION INTRAVENOUS EVERY 24 HOURS
Qty: 0 | Refills: 0 | Status: DISCONTINUED | OUTPATIENT
Start: 2018-12-21 | End: 2018-12-27

## 2018-12-21 RX ORDER — AMIODARONE HYDROCHLORIDE 400 MG/1
150 TABLET ORAL ONCE
Qty: 0 | Refills: 0 | Status: COMPLETED | OUTPATIENT
Start: 2018-12-21 | End: 2018-12-21

## 2018-12-21 RX ORDER — AMIODARONE HYDROCHLORIDE 400 MG/1
1 TABLET ORAL
Qty: 900 | Refills: 0 | Status: DISCONTINUED | OUTPATIENT
Start: 2018-12-21 | End: 2018-12-25

## 2018-12-21 RX ORDER — FUROSEMIDE 40 MG
60 TABLET ORAL ONCE
Qty: 0 | Refills: 0 | Status: COMPLETED | OUTPATIENT
Start: 2018-12-21 | End: 2018-12-21

## 2018-12-21 RX ORDER — PIPERACILLIN AND TAZOBACTAM 4; .5 G/20ML; G/20ML
3.38 INJECTION, POWDER, LYOPHILIZED, FOR SOLUTION INTRAVENOUS EVERY 6 HOURS
Qty: 0 | Refills: 0 | Status: DISCONTINUED | OUTPATIENT
Start: 2018-12-21 | End: 2018-12-27

## 2018-12-21 RX ORDER — CHLORHEXIDINE GLUCONATE 213 G/1000ML
1 SOLUTION TOPICAL
Qty: 0 | Refills: 0 | Status: DISCONTINUED | OUTPATIENT
Start: 2018-12-21 | End: 2018-12-27

## 2018-12-21 RX ORDER — CHLOROTHIAZIDE 500 MG
500 TABLET ORAL ONCE
Qty: 0 | Refills: 0 | Status: COMPLETED | OUTPATIENT
Start: 2018-12-21 | End: 2018-12-21

## 2018-12-21 RX ORDER — FUROSEMIDE 40 MG
20 TABLET ORAL
Qty: 500 | Refills: 0 | Status: DISCONTINUED | OUTPATIENT
Start: 2018-12-21 | End: 2018-12-24

## 2018-12-21 RX ORDER — FENTANYL CITRATE 50 UG/ML
25 INJECTION INTRAVENOUS ONCE
Qty: 0 | Refills: 0 | Status: DISCONTINUED | OUTPATIENT
Start: 2018-12-21 | End: 2018-12-21

## 2018-12-21 RX ADMIN — Medication 250 MILLIGRAM(S): at 19:40

## 2018-12-21 RX ADMIN — FENTANYL CITRATE 25 MICROGRAM(S): 50 INJECTION INTRAVENOUS at 18:55

## 2018-12-21 RX ADMIN — MICAFUNGIN SODIUM 105 MILLIGRAM(S): 100 INJECTION, POWDER, LYOPHILIZED, FOR SOLUTION INTRAVENOUS at 23:38

## 2018-12-21 RX ADMIN — Medication 125 MILLILITER(S): at 05:30

## 2018-12-21 RX ADMIN — PIPERACILLIN AND TAZOBACTAM 200 GRAM(S): 4; .5 INJECTION, POWDER, LYOPHILIZED, FOR SOLUTION INTRAVENOUS at 18:32

## 2018-12-21 RX ADMIN — Medication 200 GRAM(S): at 02:26

## 2018-12-21 RX ADMIN — Medication 60 MILLIGRAM(S): at 01:10

## 2018-12-21 RX ADMIN — PIPERACILLIN AND TAZOBACTAM 200 GRAM(S): 4; .5 INJECTION, POWDER, LYOPHILIZED, FOR SOLUTION INTRAVENOUS at 11:39

## 2018-12-21 RX ADMIN — AMIODARONE HYDROCHLORIDE 133.33 MILLIGRAM(S): 400 TABLET ORAL at 04:20

## 2018-12-21 RX ADMIN — Medication 75 MILLIGRAM(S): at 22:58

## 2018-12-21 RX ADMIN — AMIODARONE HYDROCHLORIDE 200 MILLIGRAM(S): 400 TABLET ORAL at 14:26

## 2018-12-21 RX ADMIN — FENTANYL CITRATE 25 MICROGRAM(S): 50 INJECTION INTRAVENOUS at 19:38

## 2018-12-21 RX ADMIN — Medication 20 MILLIGRAM(S): at 00:30

## 2018-12-21 RX ADMIN — PIPERACILLIN AND TAZOBACTAM 200 GRAM(S): 4; .5 INJECTION, POWDER, LYOPHILIZED, FOR SOLUTION INTRAVENOUS at 07:44

## 2018-12-21 RX ADMIN — AMIODARONE HYDROCHLORIDE 133.33 MILLIGRAM(S): 400 TABLET ORAL at 03:20

## 2018-12-21 RX ADMIN — PIPERACILLIN AND TAZOBACTAM 200 GRAM(S): 4; .5 INJECTION, POWDER, LYOPHILIZED, FOR SOLUTION INTRAVENOUS at 00:20

## 2018-12-21 RX ADMIN — Medication 75 MILLIGRAM(S): at 07:44

## 2018-12-21 RX ADMIN — Medication 120 MILLIGRAM(S): at 04:00

## 2018-12-21 RX ADMIN — Medication 75 MILLIGRAM(S): at 14:46

## 2018-12-21 RX ADMIN — Medication 100 MILLIGRAM(S): at 02:25

## 2018-12-21 NOTE — CONSULT NOTE ADULT - ASSESSMENT
77 yo F w/ HTN, asthma, HLD, GERD, SVT, MVP, osteoporosis, lung nodule (RUL, for 2 years), necrotizing PNA s/p biopsy showing aspergillosis transferred from [facility] w/ NSTEMI for cardiac cath. In october, pt had routine CXR for monitoring of a RUL lung nodule (that shes has had for two years), that showed increase in size. Pt s/p bronchoscopy 10/31 w/ negative biopsy and culture. Patient treated for invasive aspergillosis requiring antifungal treatment. Patient had NSTEMI requiring cardiac cath and followed by Acute severe MR requiring mitral valve replacement followed by cardiogenic shock requiring multiple IV pressors. Nephrology consulted for CVVHD.
This is a 77 y/o woman with a hx of recently diagnosed invasive aspergillus pneumonia, who presents as a transfer from an outside hospital with an NSTEMI.

## 2018-12-21 NOTE — PROGRESS NOTE ADULT - SUBJECTIVE AND OBJECTIVE BOX
CTICU  CRITICAL  CARE  attending     Hand off received 					   Pertinent clinical, laboratory, radiographic, hemodynamic, echocardiographic, respiratory data, microbiologic data and chart were reviewed and analyzed frequently throughout the course of the day and night  Patient seen and examined with CTS/ SH attending at bedside    Pt is a 78y , Female, post op day # 1 s/p MVR for acute MR from a flial mitral leaflet and cardiogenic shock    currently:    intubated  pressors/inotropes  IABP  CVVH  shock liver        Sepsis: Sepsis  Cardiogenic shock: Cardiogenic shock  HOLA (acute kidney injury): HOLA (acute kidney injury)  Hemoptysis: Hemoptysis  Pneumonia: Pneumonia      , FAMILY HISTORY:  No pertinent family history in first degree relatives  PAST MEDICAL & SURGICAL HISTORY:  Asthma, unspecified asthma severity, unspecified whether complicated, unspecified whether persistent  High cholesterol  Hypertension    Patient is a 78y old  Female who presents with a chief complaint of NSTEMI (21 Dec 2018 13:18)      14 system review was unremarkable  acute changes include acute respiratory failure  Vital signs, hemodynamic and respiratory parameters were reviewed from the bedside nursing flowsheet.  ICU Vital Signs Last 24 Hrs  T(C): 35.1 (21 Dec 2018 17:00), Max: 36.5 (21 Dec 2018 04:00)  T(F): 95.2 (21 Dec 2018 17:00), Max: 97.7 (21 Dec 2018 04:00)  HR: 76 (21 Dec 2018 17:00) (60 - 100)  BP: --  BP(mean): --  ABP: 132/54 (21 Dec 2018 17:00) (100/58 - 154/36)  ABP(mean): 82 (21 Dec 2018 17:00) (64 - 84)  RR: 14 (21 Dec 2018 17:00) (14 - 14)  SpO2: 100% (21 Dec 2018 17:00) (68% - 100%)    Adult Advanced Hemodynamics Last 24 Hrs  CVP(mm Hg): 15 (21 Dec 2018 17:00) (8 - 21)  CVP(cm H2O): --  CO: 3 (21 Dec 2018 15:00) (3 - 8.8)  CI: 1.8 (21 Dec 2018 15:00) (1.8 - 5.2)  PA: --  PA(mean): --  PCWP: --  SVR: 1678 (21 Dec 2018 15:00) (517 - 1678)  SVRI: 2808 (21 Dec 2018 15:00) (786 - 4648)  PVR: --  PVRI: --, ABG - ( 21 Dec 2018 17:12 )  pH, Arterial: 7.49  pH, Blood: x     /  pCO2: 24    /  pO2: 114   / HCO3: 18    / Base Excess: -4.0  /  SaO2: 98                Mode: AC/ CMV (Assist Control/ Continuous Mandatory Ventilation)  RR (machine): 14  TV (machine): 600  FiO2: 60  PEEP: 8  ITime: 1  MAP: 13  PIP: 27    Intake and output was reviewed and the fluid balance was calculated  Daily     Daily Weight in k.1 (21 Dec 2018 12:56)  I&O's Summary    20 Dec 2018 07:  -  21 Dec 2018 07:00  --------------------------------------------------------  IN: 5505.7 mL / OUT: 2505 mL / NET: 3000.7 mL    21 Dec 2018 07:  -  21 Dec 2018 17:18  --------------------------------------------------------  IN: 1004 mL / OUT: 778 mL / NET: 226 mL        All lines and drain sites were assessed  Glycemic trend was reviewedCAPILLARY BLOOD GLUCOSE      POCT Blood Glucose.: 131 mg/dL (21 Dec 2018 15:17)    No acute change in mental status  Auscultation of the chest reveals equal bs  Abdomen is soft  Extremities are warm and well perfused  Wounds appear clean and unremarkable  Antibiotics are periop    labs  CBC Full  -  ( 21 Dec 2018 11:15 )  WBC Count : 14.8 K/uL  Hemoglobin : 10.9 g/dL  Hematocrit : 32.1 %  Platelet Count - Automated : 68 K/uL  Mean Cell Volume : 78.1 fL  Mean Cell Hemoglobin : 26.5 pg  Mean Cell Hemoglobin Concentration : 34.0 g/dL  Auto Neutrophil # : x  Auto Lymphocyte # : x  Auto Monocyte # : x  Auto Eosinophil # : x  Auto Basophil # : x  Auto Neutrophil % : x  Auto Lymphocyte % : x  Auto Monocyte % : x  Auto Eosinophil % : x  Auto Basophil % : x    12    144  |  108  |  37<H>  ----------------------------<  131<H>  4.1   |  18<L>  |  2.13<H>    Ca    8.9      21 Dec 2018 11:15  Phos  4.4       Mg     2.1         TPro  4.2<L>  /  Alb  3.2<L>  /  TBili  4.5<H>  /  DBili  x   /  AST  >7000<H>  /  ALT  3873<H>  /  AlkPhos  111      PT/INR - ( 21 Dec 2018 11:15 )   PT: 29.1 sec;   INR: 2.50          PTT - ( 21 Dec 2018 11:15 )  PTT:37.4 sec  The current medications were reviewed   MEDICATIONS  (STANDING):  amiodarone Infusion 1 mG/Min (33.333 mL/Hr) IV Continuous <Continuous>  atorvastatin 80 milliGRAM(s) Oral at bedtime  buDESOnide 160 MICROgram(s)/formoterol 4.5 MICROgram(s) Inhaler 2 Puff(s) Inhalation two times a day  chlorhexidine 2% Cloths 1 Application(s) Topical <User Schedule>  CRRT Treatment    <Continuous>  dextrose 50% Injectable 50 milliLiter(s) IV Push every 15 minutes  DOBUTamine Infusion 3 MICROgram(s)/kG/Min (5.427 mL/Hr) IV Continuous <Continuous>  furosemide Infusion 20 mG/Hr (10 mL/Hr) IV Continuous <Continuous>  hydrocortisone sodium succinate Injectable 75 milliGRAM(s) IV Push every 8 hours  insulin Infusion 2 Unit(s)/Hr (2 mL/Hr) IV Continuous <Continuous>  milrinone Infusion 0.25 MICROgram(s)/kG/Min (4.522 mL/Hr) IV Continuous <Continuous>  montelukast 10 milliGRAM(s) Oral daily  norepinephrine Infusion 0.05 MICROgram(s)/kG/Min (2.827 mL/Hr) IV Continuous <Continuous>  pantoprazole Infusion 8 mG/Hr (10 mL/Hr) IV Continuous <Continuous>  piperacillin/tazobactam IVPB. 3.375 Gram(s) IV Intermittent every 6 hours  propofol Infusion 10 MICROgram(s)/kG/Min (3.618 mL/Hr) IV Continuous <Continuous>  PureFlow Dialysate RFP-400 (K 2 / Ca 3) 5000 milliLiter(s) (2000 mL/Hr) CRRT <Continuous>  sodium chloride 0.9%. 1000 milliLiter(s) (10 mL/Hr) IV Continuous <Continuous>  vancomycin  IVPB 1000 milliGRAM(s) IV Intermittent every 24 hours  vasopressin Infusion 0.01 Unit(s)/Min (0.6 mL/Hr) IV Continuous <Continuous>    MEDICATIONS  (PRN):  ALBUTerol/ipratropium for Nebulization 3 milliLiter(s) Nebulizer every 6 hours PRN Shortness of Breath and/or Wheezing       PROBLEM LIST/ ASSESSMENT:  HEALTH ISSUES - PROBLEM Dx:  shock liver  hemodynamic instability  IABP support  Sepsis: Sepsis  Cardiogenic shock: Cardiogenic shock  HOLA (acute kidney injury): HOLA (acute kidney injury)  Hemoptysis: Hemoptysis  Pneumonia: Pneumonia      ,   Patient is a 78y old  Female who presents with a chief complaint of NSTEMI (21 Dec 2018 13:18)     s/p MVR  acute changes include acute respiratory failure    My plan includes :  close hemodynamic, ventilatory and drain monitoring and management per post op routine    Monitor for arrhythmias and monitor parameters for organ perfusion  monitor neurologic status  Head of the bed should remain elevated to 45 deg .   chest PT and IS will be encouraged  monitor adequacy of oxygenation and ventilation and attempt to wean oxygen  Nutritional goals will be met using po eventually , ensure adequate caloric intake and montior the same  Stress ulcer and VTE prophylaxis will be achieved    Glycemic control is satisfactory  Electrolytes have been repleted as necessary and wound care has been carried out. Pain control has been achieved.   agressive physical therapy and early mobility and ambulation goals will be met   The family was updated about the course and plan  CRITICAL CARE TIME SPENT in evaluation and management, reassessments, review and interpretation of labs and x-rays, ventilator and hemodynamic management, formulating a plan and coordinating care: ___90____ MIN.  Time does not include procedural time.  CTICU ATTENDING     					    James Londono MD

## 2018-12-21 NOTE — CONSULT NOTE ADULT - PROBLEM SELECTOR RECOMMENDATION 2
Acute Cardiogenic shock due to NSTEMI/acute severe MR s/p MVR on 12/20  now on multiple IV pressors and inotropic support  PLan:  Per CT ICu team
-Has not recurred in over a week however at this point the patient is higher risk for hemoptysis given ASA, Plavix, and Heparin Gtt in the setting of NSTEMI.   -Would recommend aggressive cough suppression with hycodan.   -If any hemoptysis should occur it should be collected so that is may be quantified and monitored.   -Massive hemoptysis would be >50-100cc at any given time or >200cc in 24 hours. If this should occur please call Pulmonary fellow, as well was stat IR consult for embolization.   -If patients airway should become compromised because of hemoptysis the patient may need intubation possibly with a uni-blocker to protect the left lung.

## 2018-12-21 NOTE — CONSULT NOTE ADULT - ATTENDING COMMENTS
I concur with the evaluation of this patient. I have discussed the management of this patient with Lyle Guidry and Ho Christian.  I deeply appreciate the input that Dr. Andrew Esquivel has provided us.  I had a detailed discussion of the management plan with Dr. Qiu as well as the pt and her .  To reiterate the salient features, the patient had hemoptysis that necessitated admission approximately one week ago. CT chest showed some TIB opacities, few dilated airways (minimal bronchiectasis) and a RUL nodule abutting the minor fissure.  A bronchoscopy will be did not demonstrate any pathogens or cytology to reveal a cause of the bleeding. However, radial EBUS demonstrated fungus with acute branching. Of note the fungus was isolated from lung tissue when the right upper lobe nodule was being biopsied.  Since the patient was on flecainide, liposomal amphotericin was begun.  Flecainide was stopped. The patient developed renal insufficiency he also cardiac arrhythmias were noted including atrial fibrillation  Upon transfer to [facility], the patient was found to have elevated troponins. It is possible that the patient has had a myocardial infarction. Thus aspirin, Plavix and intravenous anticoagulation has been initiated. Although the patient has not had active hemoptysis for almost one week, his anticoagulants do increase the risk of recurrent hemoptysis.    Management:  #1: Observe the patient for any hemoptysis in the coronary care unit  #2: Should the patient developed significant/massive hemoptysis, discontinue intravenous anticoagulation.  #3 : secure the airways by intubation  #4: Call thoracic surgery for consideration of airway blocker  #5: Stabilized patient's airways and call interventional radiology for consideration of embolization  #6: Would start voriconazole as soon as possible, bearing in mind that there are multiple interactions with this medication and other medications.  #7: It is unclear why the patient developed invasive aspergillosis. We will try to see if a workup has been initiated in the past. Send IgG specific for aspergillosis, precipitins to aspergillus, IgE antibodies to aspergillus  #8: It is unclear if the patient has a bacterial infection in the lungs. Would empirically start pt on IV antibiotics for HCAP for now  We'll followup closely
Pt seen at bedside on renal rounds.  Case d/w Dr. Parker 9Lach team.  Agree with above.

## 2018-12-21 NOTE — PROGRESS NOTE ADULT - PROBLEM SELECTOR PLAN 1
-Likely the patient has Invasive aspergillus pneumonia in given recent transbronchial biopsy showing fungal elements with 45 and 90 degree branching on cytopathology (official records now in chart sent from pts primary pulmonologist). -The patient may have a concomitant bacterial pneumonia as patient had been spiking temps prior despite having been on voriconazole.   -The CT scan from [facility] was reviewed which shows a RUL nodular opacity without cavitation that abuts the fissure, in addition there are diffuse tree-in-bud opacities.   -The patient had no hx of immunosuppression known, but will need this further evaluated when she is more clinically stable.     Recommend:  -Case discussed with Inectious disease, as the cytopathology report is consistent with invasive aspergillus pneumonia, would recommend treatment. Given that the patient has significant liver injury likely from her shock state, would agree with ID that treatment with micafungin which is not first line would be appropriate for now until the patient has improved at which point she can be reassessed for initiation of voriconazole.   -Agree w/ HCAP coverage. ABX per ID.   -Please reconsult for any acute pulmonary issues.

## 2018-12-21 NOTE — CONSULT NOTE ADULT - PROBLEM SELECTOR RECOMMENDATION 3
History of aspergilosis and sepsis on IV antibiotics  Adjust antibiotics as per CVVHD with goal eGFR <10 ml/min for now as patient anuric at present.  Monitor antibiotic level when appropriate  Consider ID consult and follow cultures

## 2018-12-21 NOTE — PROCEDURE NOTE - ADDITIONAL PROCEDURE DETAILS
swan porfirio cath floated by balloon floatation   PAOP at 450 cms  pa 18/12  paop 8
Called to intubate this patient.  Pre-O2 with 100% O2. Sedated with etomidate.  DL done using MAC #3 with cricoid pressure.  Vocal cords seen, ETT placed atraumatically, attempt x1.  BS (+) b/l, EtCO2 (+). ETT taped. CXR pending.

## 2018-12-21 NOTE — DIETITIAN INITIAL EVALUATION ADULT. - ENERGY NEEDS
Ht 167.64cm; Wt 60.3Kg  IBW 59.1Kg; %%  BMI 21.5    Utilized IBW to calculate needs and adjusted for vent/post-op.

## 2018-12-21 NOTE — CONSULT NOTE ADULT - PROBLEM SELECTOR RECOMMENDATION 9
Anuric HOLA likely ischemic ATN due to severe cardiogenic shock    Plan:  Strict I/o  Daily weight  Avoid Nephrotoxic agents  Patient on multiple IV pressors and IV lasix drip for now to maintain MAP and euvolemic state.  Patient with anuric hola for past few hours for now will initiate CVVHD as per ordered  Monitor electrolytes, BMP, calcium, phos, every 6 hrly while on CVVHD  Goal fluid balance to be net negative in next 24 hours. Current fluid status net positive 3L post OR.
-Likely the patient has Invasive aspergillus pneumonia in given recent transbronchial biopsy showing fungal elements with 45 and 90 degree branching on cytopathology (official records pending transmission from pts primary pulmonologist). Despite treatment with Voriconazole the patient has still spiked temperatures and has leukocytosis. The patient may have a concomitant bacterial pneumonia.   -The CT scan from [facility] was reviewed which shows a RUL nodular opacity without cavitation that abuts the fissure, in addition there are diffuse tree and bud opacities.   -The patient had no hx of immunosuppression known.     Recommend:  -Please f/u fax of collateral information from patients primary pulmonologist.   -Would recommend an infectious disease consult.   -Based on the described report from Dr. Palacios, it would appear that the patient actually does have an invasive aspergillus pneumonia, and thus would require treatment. Would recommend a multidisciplinary approach with ID and initiating an antifungal such as voriconazole.   -If voriconazole is initiated, drug-drug interactions must be carefully monitored, especially before starting any new medications. In addition a voriconazole level should be checked within 2 weeks of starting the medication. Also patient should have LFTs and renal functioned monitored while on the medication.   -Given that the patient is febrile still, would also recommend HCAP coverage.

## 2018-12-21 NOTE — CONSULT NOTE ADULT - SUBJECTIVE AND OBJECTIVE BOX
Patient is a 78y old  Female who presents with a chief complaint of NSTEMI (21 Dec 2018 09:13)      HPI:  77 yo F w/ HTN, asthma, HLD, GERD, SVT, MVP, osteoporosis, lung nodule (RUL, for 2 years), necrotizing PNA s/p biopsy showing aspergillosis transferred from [facility] w/ NSTEMI for cardiac cath. In october, pt had routine CXR for monitoring of a RUL lung nodule (that shes has had for two years), that showed increase in size. Pt s/p bronchoscopy 10/31 w/ negative biopsy and culture. Pt had repeat bronchoscopy 2 weeks ago at [facility] which revealed aspergillosis. She followed up w/ her outpt pulmonologist  because she was having hemoptysis and cough (filled 2 tissues with blood), who referred her to [facility] ED. Pt admitted for w/ "moderate hemoptysis" and CT chest showing cavitary lesion concerning for necrotizing PNA. ID consulted, Bcx neg, and pt was started on amphotericin B (bc voriconazole interacts w/ flecainide, so used while cleared her system) for invasive aspergillosis c/b HOLA, n/v. Switched to voriconazole when flecainide cleared her two days later, w/ improvement in Cr. On the night prior to transfer to [facility],, pt spiked fever of 102 with n/v. Bcx drawn, trops I were 50. Pt had no EKG changes, CP, or SOB. Started on coreg, lipitor, lovenox, loaded w/ ASA, plavix. S/p zosyn x1 am of admission for fever. Pt defervesced, hemodynamically stable, 1 episode of paroxysmal afib that resolved s/p diltiazem. Transferred to [facility] for cardiac cath in setting of NSTEMI. On arrival, pt afebrile, tachycardic, with BP in 100s.  Pt denied CP, SOB, fevers, chills, reports stable cough, no current hemoptysis. Denies pain, dysuria, urinary frequency change, diarrhea, cardiac history    Patient with interval transfer to Surgical procedure requiring Acute MVR placement from flail leaflet followed by cardiogenic shock requiring multiple IV pressors.      PAST MEDICAL & SURGICAL HISTORY:  Asthma, unspecified asthma severity, unspecified whether complicated, unspecified whether persistent  High cholesterol  Hypertension      Allergies    No Known Allergies    Intolerances        FAMILY HISTORY:  No pertinent family history in first degree relatives      SOCIAL HISTORY:    MEDICATIONS  (STANDING):  amiodarone Infusion 1 mG/Min (33.333 mL/Hr) IV Continuous <Continuous>  atorvastatin 80 milliGRAM(s) Oral at bedtime  buDESOnide 160 MICROgram(s)/formoterol 4.5 MICROgram(s) Inhaler 2 Puff(s) Inhalation two times a day  chlorhexidine 2% Cloths 1 Application(s) Topical <User Schedule>  CRRT Treatment    <Continuous>  dextrose 50% Injectable 50 milliLiter(s) IV Push every 15 minutes  DOBUTamine Infusion 3 MICROgram(s)/kG/Min (5.427 mL/Hr) IV Continuous <Continuous>  furosemide Infusion 20 mG/Hr (10 mL/Hr) IV Continuous <Continuous>  hydrocortisone sodium succinate Injectable 75 milliGRAM(s) IV Push every 8 hours  insulin Infusion 2 Unit(s)/Hr (2 mL/Hr) IV Continuous <Continuous>  milrinone Infusion 0.25 MICROgram(s)/kG/Min (4.522 mL/Hr) IV Continuous <Continuous>  montelukast 10 milliGRAM(s) Oral daily  norepinephrine Infusion 0.05 MICROgram(s)/kG/Min (2.827 mL/Hr) IV Continuous <Continuous>  pantoprazole Infusion 8 mG/Hr (10 mL/Hr) IV Continuous <Continuous>  piperacillin/tazobactam IVPB. 3.375 Gram(s) IV Intermittent every 6 hours  propofol Infusion 10 MICROgram(s)/kG/Min (3.618 mL/Hr) IV Continuous <Continuous>  PureFlow Dialysate RFP-400 (K 2 / Ca 3) 5000 milliLiter(s) (2000 mL/Hr) CRRT <Continuous>  sodium chloride 0.9%. 1000 milliLiter(s) (10 mL/Hr) IV Continuous <Continuous>  vancomycin  IVPB 1000 milliGRAM(s) IV Intermittent every 24 hours  vasopressin Infusion 0.01 Unit(s)/Min (0.6 mL/Hr) IV Continuous <Continuous>    MEDICATIONS  (PRN):  ALBUTerol/ipratropium for Nebulization 3 milliLiter(s) Nebulizer every 6 hours PRN Shortness of Breath and/or Wheezing      REVIEW OF SYSTEMS:    Limited. Patient sedated and intubated on ventilatory support.    PHYSICAL EXAM:  GENERAL: Ill appearing, lying in bed, sedated and intubated on ventilatory support at present  HEAD:  Atraumatic, Normocephalic,   EYES: Bilateral conjunctival and sclera normal,  Oral cavity: Oral mucosa moist and pink, ETT tube present  NECK: Neck supple, No JVD  CHEST/LUNG: Bilateral decreased breath sounds, bibasilar minimal crepitations, no wheezing  HEART: Regular rate and rhythm. HIREN II/VI at LPSB, No gallop, no rub   ABDOMEN: Soft, Nontender, non distended,  bowel sounds present, No flank tenderness.   EXTREMITIES: Bilateral leg no edema, no cyanosis, no clubbing  Neurology: AAOx0, sedated and intubated on ventilatory support  SKIN: No rashes or lesions      CAPILLARY BLOOD GLUCOSE      POCT Blood Glucose.: 106 mg/dL (21 Dec 2018 10:53)  POCT Blood Glucose.: 122 mg/dL (21 Dec 2018 08:04)  POCT Blood Glucose.: 125 mg/dL (21 Dec 2018 07:12)  POCT Blood Glucose.: 181 mg/dL (21 Dec 2018 03:23)  POCT Blood Glucose.: 169 mg/dL (21 Dec 2018 02:21)  POCT Blood Glucose.: 181 mg/dL (21 Dec 2018 01:09)  POCT Blood Glucose.: 179 mg/dL (21 Dec 2018 00:11)  POCT Blood Glucose.: 198 mg/dL (20 Dec 2018 23:08)  POCT Blood Glucose.: 210 mg/dL (20 Dec 2018 21:46)      I&O's Summary    20 Dec 2018 07:01  -  21 Dec 2018 07:00  --------------------------------------------------------  IN: 5505.7 mL / OUT: 2505 mL / NET: 3000.7 mL    21 Dec 2018 07:01  -  21 Dec 2018 11:51  --------------------------------------------------------  IN: 362.4 mL / OUT: 161 mL / NET: 201.4 mL          LABS:                                                   18 @ 07:46    144  |  107  |  34<H>  ----------------------------<  139<H>  4.2   |  17<L>  |  1.98<H>                                                 18 @ 04:33    145  |  107  |  33<H>  ----------------------------<  160<H>  4.1   |  17<L>  |  1.85<H>                                                 18 @ 01:24    146<H>  |  107  |  32<H>  ----------------------------<  179<H>  3.7   |  17<L>  |  1.74<H>                                                 18 @ 21:54    146<H>  |  105  |  30<H>  ----------------------------<  204<H>  4.0   |  18<L>  |  1.66<H>                                                 18 @ 17:37    147<H>  |  110<H>  |  28<H>  ----------------------------<  149<H>  4.2   |  19<L>  |  1.63<H>                                                 18 @ 14:13    147<H>  |  108  |  25<H>  ----------------------------<  122<H>  4.2   |  18<L>  |  1.41<H>                                                 18 @ 13:02    148<H>  |  109<H>  |  24<H>  ----------------------------<  109<H>  4.1   |  19<L>  |  1.42<H>                                                 18 @ 08:28    137  |  109<H>  |  27<H>  ----------------------------<  453<HH>  5.8<H>   |  13<L>  |  1.63<H>                                                 18 @ 05:43    139  |  112<H>  |  26<H>  ----------------------------<  134<H>  6.1<H>   |  11<L>  |  1.51<H>                                                 18 @ 17:09    138  |  103  |  24<H>  ----------------------------<  115<H>  3.7   |  20<L>  |  1.20    Ca    8.5      21 Dec 2018 07:46  Ca    8.7      21 Dec 2018 04:33  Ca    8.3<L>      21 Dec 2018 01:24  Ca    8.7      20 Dec 2018 21:54  Ca    8.4      20 Dec 2018 17:37  Ca    8.7      20 Dec 2018 14:13  Ca    7.9<L>      20 Dec 2018 13:02  Ca    7.1<L>      20 Dec 2018 08:28  Ca    6.4<LL>      20 Dec 2018 05:43  Ca    7.5<L>      19 Dec 2018 17:09  Phos  4.5     12-21  Phos  4.8     12-21  Phos  5.5     12-21  Phos  6.0     12-20  Phos  5.6     12-20  Mg     2.1     12-21  Mg     2.1     12-21  Mg     2.1     12-21  Mg     2.3     12-20  Mg     2.3     12-20  Mg     2.5     12-20  Mg     2.0     12-19    TPro  4.1<L>  /  Alb  3.2<L>  /  TBili  3.8<H>  /  DBili  x   /  AST  >7000<H>  /  ALT  3850<H>  /  AlkPhos  97  12-21  TPro  3.9<L>  /  Alb  2.9<L>  /  TBili  3.5<H>  /  DBili  x   /  AST  >7000  /  ALT  3948<H>  /  AlkPhos  89  12-21  TPro  3.8<L>  /  Alb  2.8<L>  /  TBili  3.2<H>  /  DBili  x   /  AST  >7000<H>  /  ALT  4062<H>  /  AlkPhos  82  12-21  TPro  3.8<L>  /  Alb  2.6<L>  /  TBili  3.1<H>  /  DBili  x   /  AST  >7000  /  ALT  4487<H>  /  AlkPhos  80  12-20  TPro  3.8<L>  /  Alb  2.2<L>  /  TBili  2.8<H>  /  DBili  x   /  AST  >7000<H>  /  ALT  5274<H>  /  AlkPhos  97  12-20  TPro  4.2<L>  /  Alb  2.0<L>  /  TBili  1.3<H>  /  DBili  x   /  AST  2205<H>  /  ALT  1711<H>  /  AlkPhos  112  12-20  TPro  4.6<L>  /  Alb  2.2<L>  /  TBili  1.2  /  DBili  0.4<H>  /  AST  698<H>  /  ALT  547<H>  /  AlkPhos  105  12-20  TPro  5.1<L>  /  Alb  2.7<L>  /  TBili  0.9  /  DBili  x   /  AST  78<H>  /  ALT  33  /  AlkPhos  80  12-19                          10.9   14.8  )-----------( 68       ( 21 Dec 2018 11:15 )             32.1     CBC Full  -  ( 21 Dec 2018 11:15 )  WBC Count : 14.8 K/uL  Hemoglobin : 10.9 g/dL  Hematocrit : 32.1 %  Platelet Count - Automated : 68 K/uL  Mean Cell Volume : 78.1 fL      CBC Full  -  ( 21 Dec 2018 07:46 )  WBC Count : 12.2 K/uL  Hemoglobin : 10.7 g/dL  Hematocrit : 30.8 %  Platelet Count - Automated : 60 K/uL  Mean Cell Volume : 78.2 fL      CBC Full  -  ( 21 Dec 2018 04:33 )  WBC Count : 12.2 K/uL  Hemoglobin : 9.9 g/dL  Hematocrit : 28.9 %  Platelet Count - Automated : 83 K/uL  Mean Cell Volume : 79.6 fL      CBC Full  -  ( 21 Dec 2018 01:24 )  WBC Count : 11.8 K/uL  Hemoglobin : 9.1 g/dL  Hematocrit : 26.4 %  Platelet Count - Automated : 101 K/uL  Mean Cell Volume : 79.3 fL      CBC Full  -  ( 20 Dec 2018 21:54 )  WBC Count : 12.9 K/uL  Hemoglobin : 9.6 g/dL  Hematocrit : 27.8 %  Platelet Count - Automated : 39 K/uL  Mean Cell Volume : 86.6 fL      CBC Full  -  ( 20 Dec 2018 17:37 )  WBC Count : 15.0 K/uL  Hemoglobin : 11.5 g/dL  Hematocrit : 33.4 %  Platelet Count - Automated : 48 K/uL  Mean Cell Volume : 86.1 fL      CBC Full  -  ( 20 Dec 2018 14:13 )  WBC Count : 13.2 K/uL  Hemoglobin : 11.4 g/dL  Hematocrit : 33.5 %  Platelet Count - Automated : 62 K/uL  Mean Cell Volume : 88.6 fL      CBC Full  -  ( 20 Dec 2018 13:02 )  WBC Count : 12.7 K/uL  Hemoglobin : 9.1 g/dL  Hematocrit : 27.5 %  Platelet Count - Automated : 72 K/uL  Mean Cell Volume : 90.5 fL      CBC Full  -  ( 20 Dec 2018 08:28 )  WBC Count : 13.1 K/uL  Hemoglobin : 10.5 g/dL  Hematocrit : 33.6 %  Platelet Count - Automated : 112 K/uL  Mean Cell Volume : 100.3 fL      CBC Full  -  ( 20 Dec 2018 05:43 )  WBC Count : 12.9 K/uL  Hemoglobin : 11.1 g/dL  Hematocrit : 34.7 %  Platelet Count - Automated : 152 K/uL  Mean Cell Volume : 98.9 fL      CBC Full  -  ( 19 Dec 2018 17:10 )  WBC Count : 14.4 K/uL  Hemoglobin : 10.2 g/dL  Hematocrit : 31.2 %  Platelet Count - Automated : 143 K/uL  Mean Cell Volume : 96.3 fL        PT/INR - ( 21 Dec 2018 11:15 )   PT: 29.1 sec;   INR: 2.50          PTT - ( 21 Dec 2018 11:15 )  PTT:37.4 sec  CARDIAC MARKERS ( 20 Dec 2018 13:02 )  x     / x     / 589 U/L / x     / 22.2 ng/mL  CARDIAC MARKERS ( 20 Dec 2018 05:43 )  x     / 3.93 ng/mL / 450 U/L / x     / 8.8 ng/mL  CARDIAC MARKERS ( 19 Dec 2018 22:48 )  x     / 2.42 ng/mL / 559 U/L / x     / 12.2 ng/mL  CARDIAC MARKERS ( 19 Dec 2018 17:09 )  x     / 2.67 ng/mL / 724 U/L / x     / 17.8 ng/mL      Urinalysis Basic - ( 20 Dec 2018 04:08 )    Color: Yellow / Appearance: Clear / S.020 / pH: x  Gluc: x / Ketone: NEGATIVE  / Bili: Negative / Urobili: 0.2 E.U./dL   Blood: x / Protein: NEGATIVE mg/dL / Nitrite: NEGATIVE   Leuk Esterase: NEGATIVE / RBC: < 5 /HPF / WBC < 5 /HPF   Sq Epi: x / Non Sq Epi: 0-5 /HPF / Bacteria: Present /HPF        RADIOLOGY & ADDITIONAL TESTS:  < from: Xray Chest 1 View- PORTABLE-Urgent (18 @ 07:21) >    ******PRELIMINARY REPORT******    ******PRELIMINARY REPORT******            EXAM:  XR CHEST PORTABLE URGENT 1V                          PROCEDURE DATE:  2018    ******PRELIMINARY REPORT******    ******PRELIMINARY REPORT******              INTERPRETATION:  Resident preliminary report    AP Portable CXR dated 2018 7:21 AM    CLINICAL INFORMATION: NG tube placement    PRIOR STUDIES: Chest radiograph dated 2018 6:44 AM    FINDINGS: Appropriate positioning right IJ central venous catheter, ETT   and enteric tube. The cardiovascular silhouette is unchanged. No change   in severe pulmonary edema. No pleural effusions.    IMPRESSION:  Appropriate positioning enteric tube. No change in pulmonary edema.                  "Thank you for the opportunity to participate in the care of this   patient."  ******PRELIMINARY REPORT******    ******PRELIMINARY REPORT******          MODESTO PECK M.D., RADIOLOGY RESIDENT                        < end of copied text >      EKG/Telemetry: Reviewed

## 2018-12-21 NOTE — DIETITIAN INITIAL EVALUATION ADULT. - OTHER INFO
79y/o Female, s/p emergent MVR for acute MR with cardiogenic shock; h/o NSTEMI. Pt remains intubated and sedated on full vent. Propofol now off. CVVHD running. With continued intubation, recommend early EN as medically feasible with hemodynamic stability; MAP >65, pressors and lactate trending down. Please see TF recs below. RD to follow per policy.

## 2018-12-21 NOTE — DIETITIAN INITIAL EVALUATION ADULT. - NS AS NUTRI INTERV ENTERAL NUTRITION
Composition/Rate/Route/With continued intubation, recommend early EN as medically feasible with hemodynamic stability; Jevity 1.5 with goal rate of 47ml/hr x 24hr + 1x prostat (1128ml TV, 1792kcal, 87g, 857ml water). Additional fluid per MD. Start at 30ml and advance as tolerated by 10ml q4h until goal. Monitor for s/s of intolerance and maintain aspiration precautions.

## 2018-12-21 NOTE — PROGRESS NOTE ADULT - ASSESSMENT
79 yo F w/ HTN, Asthma (not on steroids), HLD, GERD, SVT (on Flecainide), MVP, Osteoporosis, known Lung nodule (RUL, for 2 years) - who presents as a transfer from OSH for NSTEMI management. Patient presented initially to OSH for hemoptysis in the context of a Bronchoscopy performed 2 weeks prior that reportedly was positive on FNA/tissue biopsy for Aspergillus. This pulmonary workup was being performed for evaluation of the known lung nodule that had been found to be enlarging. During hospitalization, patient was started on anti-fungal treatment (Amphotericin initially, then switched to Voriconazole) given c/f invasive aspergillosus based on report of prior bronch, but treatments were halted 2/2 intolerance of side effects. Reports now obtained from [facility] indicating tissue confirmation of Aspergillus - engendering need for treatment of invasive aspergillus. Patient has no obvious risk factors for the development of an invasive aspergillosus (no chronic steroid use, immunocompromise). Would also persist with HCAP coverage at this time as it can not be excluded that patient has a bacterial pneumonia. Patient decompensated acutely, developing cardiogenic shock 2/2 ruptured papillary muscle in the context of w/u for NSTEMI. Patient emergently taken to OR for mitral valve repair, returned with IABP in place for CO support. Currently in severe cardiogenic shock on pressor and ionotrope support with multiple indicators of severe end organ damage, mainly persistent lactic acidosis, shock liver, and ischemic ATN necessitating CVVHD.     Suggest:  - C/w coverage for Bacterial Pneumonia - Zosyn 2.25 q6 and Vancomycin 1g q 24  - Confirmed tissue diagnosis of Aspergillus indicating need to start treatment with anti-fungal. Voriconazole would have been agent of choice, however, in light of severe renal and hepatic impairment and potential for dysrhythmia, this would not be suitable.  - Instead, please start Micafungin 100mg q24 hours - no renal or hepatic adjustment needed even for patients on dialysis.     Plan discussed with primary team.

## 2018-12-21 NOTE — PROGRESS NOTE ADULT - ASSESSMENT
This is a 79 y/o woman with a hx of recently diagnosed invasive aspergillus pneumonia, who presents as a transfer from an outside hospital with an NSTEMI. The patient developed a ruptured papillary muscle and a flail mitral leaflet and developed angelo cardiogenic pulmonary edema and shock. She is s/p open mitral valve replacement w/ b/l chest tube placed and is currently intubated and receiving her care in the CT ICU.

## 2018-12-21 NOTE — PROGRESS NOTE ADULT - SUBJECTIVE AND OBJECTIVE BOX
INFECTIOUS DISEASE CONSULT PROGRESS NOTE    INTERVAL HPI/OVERNIGHT EVENTS:    ACTIVE ANTIMICROBIALS/ANTIBIOTICS:  piperacillin/tazobactam IVPB. 3.375 Gram(s) IV Intermittent every 6 hours  vancomycin  IVPB 1000 milliGRAM(s) IV Intermittent every 24 hours      VITAL SIGNS:  ICU Vital Signs Last 24 Hrs  T(C): 35.9 (21 Dec 2018 08:00), Max: 36.5 (21 Dec 2018 04:00)  T(F): 96.6 (21 Dec 2018 08:00), Max: 97.7 (21 Dec 2018 04:00)  HR: 84 (21 Dec 2018 08:00) (60 - 108)  BP: --  BP(mean): 64 (20 Dec 2018 14:30) (64 - 64)  ABP: 124/54 (21 Dec 2018 08:00) (100/58 - 164/43)  ABP(mean): 80 (21 Dec 2018 08:00) (64 - 99)  RR: 14 (21 Dec 2018 08:00) (14 - 16)  SpO2: 68% (21 Dec 2018 06:30) (68% - 100%)      PHYSICAL EXAM:  Constitutional: WDWN  Head: NC/AT  Eyes: PERRL, anicteric sclera  ENMT: no rhinorrhea; clear oropharynx; MMM  Neck: supple  Respiratory: CTA B/L  Cardiovascular: +S1/S2, RRR  Gastrointestinal: soft, NT/ND; intact BS  Extremities: WWP; no clubbing, cyanosis, or edema  Vascular: 2+ radial, DP/PT pulses B/L  Dermatologic: skin warm and dry; no visible rashes or lesions  Neurologic: AAOx3    LABS:                        10.7   12.2  )-----------( 60       ( 21 Dec 2018 07:46 )             30.8       12-21    144  |  107  |  34<H>  ----------------------------<  139<H>  4.2   |  17<L>  |  1.98<H>    Ca    8.5      21 Dec 2018 07:46  Phos  4.5     12-  Mg     2.1     12-    TPro  4.1<L>  /  Alb  3.2<L>  /  TBili  3.8<H>  /  DBili  x   /  AST  >7000<H>  /  ALT  3850<H>  /  AlkPhos  97      Urinalysis Basic - ( 20 Dec 2018 04:08 )    Color: Yellow / Appearance: Clear / S.020 / pH: x  Gluc: x / Ketone: NEGATIVE  / Bili: Negative / Urobili: 0.2 E.U./dL   Blood: x / Protein: NEGATIVE mg/dL / Nitrite: NEGATIVE   Leuk Esterase: NEGATIVE / RBC: < 5 /HPF / WBC < 5 /HPF   Sq Epi: x / Non Sq Epi: 0-5 /HPF / Bacteria: Present /HPF      Lactate, Blood: 6.8 mmoL/L (18 @ 07:46)  Lactate, Blood: 7.8 mmoL/L (18 @ 04:33)  Lactate, Blood: 7.9 mmoL/L (18 @ 01:24)  Lactate, Blood: 7.0 mmoL/L (18 @ 21:53)  Lactate, Blood: 7.0 mmoL/L (18 @ 17:08)  Lactate, Blood: 6.9 mmoL/L (18 @ 15:09)  Lactate, Blood: 7.0 mmoL/L (18 @ 14:13)  Lactate, Blood: 6.9 mmoL/L (18 @ 13:01)      MICROBIOLOGY:      RADIOLOGY & ADDITIONAL STUDIES: INFECTIOUS DISEASE CONSULT PROGRESS NOTE    INTERVAL HPI/OVERNIGHT EVENTS:  Patient is still intubated, sedated in cardiogenic shock on multiple pressors with IABP in place. She demonstrates multiple signs of end organ damage, including shock liver and ischemic ATN. Now, in the setting of anuria, she is to start on CVVHD.     ACTIVE ANTIMICROBIALS/ANTIBIOTICS:  piperacillin/tazobactam IVPB. 3.375 Gram(s) IV Intermittent every 6 hours  vancomycin  IVPB 1000 milliGRAM(s) IV Intermittent every 24 hours      VITAL SIGNS:  T(C): 35.9 (21 Dec 2018 08:00), Max: 36.5 (21 Dec 2018 04:00)  T(F): 96.6 (21 Dec 2018 08:00), Max: 97.7 (21 Dec 2018 04:00)  HR: 84 (21 Dec 2018 08:00) (60 - 108)  BP(mean): 64 (20 Dec 2018 14:30) (64 - 64)  ABP: 124/54 (21 Dec 2018 08:00) (100/58 - 164/43)  ABP(mean): 80 (21 Dec 2018 08:00) (64 - 99)  RR: 14 (21 Dec 2018 08:00) (14 - 16)  SpO2: 68% (21 Dec 2018 06:30) (68% - 100%)      PHYSICAL EXAM:  Constitutional: Patient is intubated and sedated.   Head: NC/AT  Eyes: PERRL, anicteric sclera  ENMT: Edematous, bruised tongue.   Neck: No LAD  Respiratory: Coarse breath sounds b/l - no e/o wheeze   Cardiovascular: Loud S1 S2 likely amplified by presence of IABP   Gastrointestinal: 3 Mediastinal/pleural drains, draining blood. via MAURICE drains.   Extremities: Cool to touch.  Vascular: 2+ PT B/l, DP difficult to appreciate.   Dermatologic: skin warm and dry; no visible rashes or lesions  Neurologic: Sedated. No obvious cranial nerve deficit on observation.     LABS:                        10.7   12.2  )-----------( 60       ( 21 Dec 2018 07:46 )             30.8       12-21    144  |  107  |  34<H>  ----------------------------<  139<H>  4.2   |  17<L>  |  1.98<H>    Ca    8.5      21 Dec 2018 07:46  Phos  4.5       Mg     2.1         TPro  4.1<L>  /  Alb  3.2<L>  /  TBili  3.8<H>  /  DBili  x   /  AST  >7000<H>  /  ALT  3850<H>  /  AlkPhos  97      Urinalysis Basic - ( 20 Dec 2018 04:08 )    Color: Yellow / Appearance: Clear / S.020 / pH: x  Gluc: x / Ketone: NEGATIVE  / Bili: Negative / Urobili: 0.2 E.U./dL   Blood: x / Protein: NEGATIVE mg/dL / Nitrite: NEGATIVE   Leuk Esterase: NEGATIVE / RBC: < 5 /HPF / WBC < 5 /HPF   Sq Epi: x / Non Sq Epi: 0-5 /HPF / Bacteria: Present /HPF      Lactate, Blood: 6.8 mmoL/L (18 @ 07:46)  Lactate, Blood: 7.8 mmoL/L (18 @ 04:33)  Lactate, Blood: 7.9 mmoL/L (18 @ 01:24)  Lactate, Blood: 7.0 mmoL/L (18 @ 21:53)  Lactate, Blood: 7.0 mmoL/L (18 @ 17:08)  Lactate, Blood: 6.9 mmoL/L (18 @ 15:09)  Lactate, Blood: 7.0 mmoL/L (18 @ 14:13)  Lactate, Blood: 6.9 mmoL/L (18 @ 13:01)      MICROBIOLOGY:  Pending.     RADIOLOGY & ADDITIONAL STUDIES:  < from: Xray Chest 1 View- PORTABLE-Urgent (18 @ 07:21) >  FINDINGS: Appropriate positioning right IJ central venous catheter, ETT   and enteric tube. The cardiovascular silhouette is unchanged. No change   in severe pulmonary edema. No pleural effusions.

## 2018-12-22 LAB
ALBUMIN SERPL ELPH-MCNC: 2 G/DL — LOW (ref 3.3–5)
ALBUMIN SERPL ELPH-MCNC: 2 G/DL — LOW (ref 3.3–5)
ALBUMIN SERPL ELPH-MCNC: 2.3 G/DL — LOW (ref 3.3–5)
ALBUMIN SERPL ELPH-MCNC: 2.5 G/DL — LOW (ref 3.3–5)
ALBUMIN SERPL ELPH-MCNC: 2.9 G/DL — LOW (ref 3.3–5)
ALP SERPL-CCNC: 117 U/L — SIGNIFICANT CHANGE UP (ref 40–120)
ALP SERPL-CCNC: 118 U/L — SIGNIFICANT CHANGE UP (ref 40–120)
ALP SERPL-CCNC: 128 U/L — HIGH (ref 40–120)
ALP SERPL-CCNC: 132 U/L — HIGH (ref 40–120)
ALP SERPL-CCNC: 133 U/L — HIGH (ref 40–120)
ALT FLD-CCNC: 1574 U/L — HIGH (ref 10–45)
ALT FLD-CCNC: 1638 U/L — HIGH (ref 10–45)
ALT FLD-CCNC: 2426 U/L — HIGH (ref 10–45)
ALT FLD-CCNC: 2694 U/L — HIGH (ref 10–45)
ANION GAP SERPL CALC-SCNC: 18 MMOL/L — HIGH (ref 5–17)
ANION GAP SERPL CALC-SCNC: 19 MMOL/L — HIGH (ref 5–17)
ANION GAP SERPL CALC-SCNC: 20 MMOL/L — HIGH (ref 5–17)
ANION GAP SERPL CALC-SCNC: 20 MMOL/L — HIGH (ref 5–17)
ANION GAP SERPL CALC-SCNC: 23 MMOL/L — HIGH (ref 5–17)
ANION GAP SERPL CALC-SCNC: 24 MMOL/L — HIGH (ref 5–17)
APTT BLD: 32 SEC — SIGNIFICANT CHANGE UP (ref 27.5–36.3)
APTT BLD: 34.5 SEC — SIGNIFICANT CHANGE UP (ref 27.5–36.3)
APTT BLD: 44.3 SEC — HIGH (ref 27.5–36.3)
APTT BLD: 46.9 SEC — HIGH (ref 27.5–36.3)
APTT BLD: 49.4 SEC — HIGH (ref 27.5–36.3)
AST SERPL-CCNC: 5899 U/L — HIGH (ref 10–40)
AST SERPL-CCNC: 6304 U/L — HIGH (ref 10–40)
AST SERPL-CCNC: >7000 U/L — HIGH (ref 10–40)
AST SERPL-CCNC: >7000 U/L — HIGH (ref 10–40)
BASE EXCESS BLDA CALC-SCNC: -2.5 MMOL/L — LOW (ref -2–3)
BASE EXCESS BLDMV CALC-SCNC: -0.5 MMOL/L — SIGNIFICANT CHANGE UP
BASE EXCESS BLDMV CALC-SCNC: -5.5 MMOL/L — SIGNIFICANT CHANGE UP
BASE EXCESS BLDMV CALC-SCNC: -6.4 MMOL/L — SIGNIFICANT CHANGE UP
BASE EXCESS BLDV CALC-SCNC: -1.9 MMOL/L — SIGNIFICANT CHANGE UP
BASE EXCESS BLDV CALC-SCNC: -3.1 MMOL/L — SIGNIFICANT CHANGE UP
BASE EXCESS BLDV CALC-SCNC: 2.6 MMOL/L — SIGNIFICANT CHANGE UP
BILIRUB DIRECT SERPL-MCNC: 1 MG/DL — HIGH (ref 0–0.2)
BILIRUB DIRECT SERPL-MCNC: 1.3 MG/DL — HIGH (ref 0–0.2)
BILIRUB INDIRECT FLD-MCNC: 5.9 MG/DL — HIGH (ref 0.2–1)
BILIRUB INDIRECT FLD-MCNC: 7.4 MG/DL — HIGH (ref 0.2–1)
BILIRUB SERPL-MCNC: 14.5 MG/DL — HIGH (ref 0.2–1.2)
BILIRUB SERPL-MCNC: 5.8 MG/DL — HIGH (ref 0.2–1.2)
BILIRUB SERPL-MCNC: 6.2 MG/DL — HIGH (ref 0.2–1.2)
BILIRUB SERPL-MCNC: 6.9 MG/DL — HIGH (ref 0.2–1.2)
BILIRUB SERPL-MCNC: 8.7 MG/DL — HIGH (ref 0.2–1.2)
BUN SERPL-MCNC: 26 MG/DL — HIGH (ref 7–23)
BUN SERPL-MCNC: 31 MG/DL — HIGH (ref 7–23)
BUN SERPL-MCNC: 33 MG/DL — HIGH (ref 7–23)
BUN SERPL-MCNC: 35 MG/DL — HIGH (ref 7–23)
BUN SERPL-MCNC: 36 MG/DL — HIGH (ref 7–23)
BUN SERPL-MCNC: 36 MG/DL — HIGH (ref 7–23)
CALCIUM SERPL-MCNC: 7.5 MG/DL — LOW (ref 8.4–10.5)
CALCIUM SERPL-MCNC: 7.5 MG/DL — LOW (ref 8.4–10.5)
CALCIUM SERPL-MCNC: 7.8 MG/DL — LOW (ref 8.4–10.5)
CALCIUM SERPL-MCNC: 8 MG/DL — LOW (ref 8.4–10.5)
CALCIUM SERPL-MCNC: 8.5 MG/DL — SIGNIFICANT CHANGE UP (ref 8.4–10.5)
CALCIUM SERPL-MCNC: 8.6 MG/DL — SIGNIFICANT CHANGE UP (ref 8.4–10.5)
CHLORIDE SERPL-SCNC: 100 MMOL/L — SIGNIFICANT CHANGE UP (ref 96–108)
CHLORIDE SERPL-SCNC: 103 MMOL/L — SIGNIFICANT CHANGE UP (ref 96–108)
CHLORIDE SERPL-SCNC: 96 MMOL/L — SIGNIFICANT CHANGE UP (ref 96–108)
CHLORIDE SERPL-SCNC: 96 MMOL/L — SIGNIFICANT CHANGE UP (ref 96–108)
CHLORIDE SERPL-SCNC: 99 MMOL/L — SIGNIFICANT CHANGE UP (ref 96–108)
CHLORIDE SERPL-SCNC: 99 MMOL/L — SIGNIFICANT CHANGE UP (ref 96–108)
CO2 SERPL-SCNC: 16 MMOL/L — LOW (ref 22–31)
CO2 SERPL-SCNC: 17 MMOL/L — LOW (ref 22–31)
CO2 SERPL-SCNC: 17 MMOL/L — LOW (ref 22–31)
CO2 SERPL-SCNC: 18 MMOL/L — LOW (ref 22–31)
CO2 SERPL-SCNC: 20 MMOL/L — LOW (ref 22–31)
CO2 SERPL-SCNC: 21 MMOL/L — LOW (ref 22–31)
COHGB MFR BLDMV: 2.2 % — HIGH
COHGB MFR BLDMV: 2.6 % — HIGH
COHGB MFR BLDMV: 2.7 % — HIGH
CREAT SERPL-MCNC: 1.26 MG/DL — SIGNIFICANT CHANGE UP (ref 0.5–1.3)
CREAT SERPL-MCNC: 1.41 MG/DL — HIGH (ref 0.5–1.3)
CREAT SERPL-MCNC: 1.61 MG/DL — HIGH (ref 0.5–1.3)
CREAT SERPL-MCNC: 1.93 MG/DL — HIGH (ref 0.5–1.3)
CREAT SERPL-MCNC: 1.98 MG/DL — HIGH (ref 0.5–1.3)
CREAT SERPL-MCNC: 1.99 MG/DL — HIGH (ref 0.5–1.3)
FIBRINOGEN PPP-MCNC: >650 MG/DL — HIGH (ref 258–438)
FIBRINOGEN PPP-MCNC: >650 MG/DL — HIGH (ref 258–438)
FIBRINOGEN PPP-MCNC: SIGNIFICANT CHANGE UP MG/DL (ref 258–438)
FUNGITELL: <31 PG/ML — SIGNIFICANT CHANGE UP
GAS PNL BLDA: SIGNIFICANT CHANGE UP
GAS PNL BLDMV: SIGNIFICANT CHANGE UP
GLUCOSE BLDC GLUCOMTR-MCNC: 104 MG/DL — HIGH (ref 70–99)
GLUCOSE BLDC GLUCOMTR-MCNC: 111 MG/DL — HIGH (ref 70–99)
GLUCOSE BLDC GLUCOMTR-MCNC: 118 MG/DL — HIGH (ref 70–99)
GLUCOSE BLDC GLUCOMTR-MCNC: 120 MG/DL — HIGH (ref 70–99)
GLUCOSE BLDC GLUCOMTR-MCNC: 126 MG/DL — HIGH (ref 70–99)
GLUCOSE BLDC GLUCOMTR-MCNC: 137 MG/DL — HIGH (ref 70–99)
GLUCOSE BLDC GLUCOMTR-MCNC: 138 MG/DL — HIGH (ref 70–99)
GLUCOSE BLDC GLUCOMTR-MCNC: 150 MG/DL — HIGH (ref 70–99)
GLUCOSE BLDC GLUCOMTR-MCNC: 170 MG/DL — HIGH (ref 70–99)
GLUCOSE BLDC GLUCOMTR-MCNC: 237 MG/DL — HIGH (ref 70–99)
GLUCOSE BLDC GLUCOMTR-MCNC: 92 MG/DL — SIGNIFICANT CHANGE UP (ref 70–99)
GLUCOSE SERPL-MCNC: 109 MG/DL — HIGH (ref 70–99)
GLUCOSE SERPL-MCNC: 139 MG/DL — HIGH (ref 70–99)
GLUCOSE SERPL-MCNC: 139 MG/DL — HIGH (ref 70–99)
GLUCOSE SERPL-MCNC: 151 MG/DL — HIGH (ref 70–99)
GLUCOSE SERPL-MCNC: 203 MG/DL — HIGH (ref 70–99)
GLUCOSE SERPL-MCNC: 99 MG/DL — SIGNIFICANT CHANGE UP (ref 70–99)
HCO3 BLDA-SCNC: 21 MMOL/L — SIGNIFICANT CHANGE UP (ref 21–28)
HCO3 BLDMV-SCNC: 17 MMOL/L — SIGNIFICANT CHANGE UP
HCO3 BLDMV-SCNC: 20 MMOL/L — SIGNIFICANT CHANGE UP
HCO3 BLDMV-SCNC: 23 MMOL/L — SIGNIFICANT CHANGE UP
HCO3 BLDV-SCNC: 22 MMOL/L — SIGNIFICANT CHANGE UP (ref 20–27)
HCO3 BLDV-SCNC: 22 MMOL/L — SIGNIFICANT CHANGE UP (ref 20–27)
HCO3 BLDV-SCNC: 26 MMOL/L — SIGNIFICANT CHANGE UP (ref 20–27)
HCT VFR BLD CALC: 18.6 % — CRITICAL LOW (ref 34.5–45)
HCT VFR BLD CALC: 20.4 % — CRITICAL LOW (ref 34.5–45)
HCT VFR BLD CALC: 20.7 % — CRITICAL LOW (ref 34.5–45)
HCT VFR BLD CALC: 24.3 % — LOW (ref 34.5–45)
HCT VFR BLD CALC: 24.3 % — LOW (ref 34.5–45)
HCT VFR BLD CALC: 26.9 % — LOW (ref 34.5–45)
HCT VFR BLD CALC: 28 % — LOW (ref 34.5–45)
HGB BLD CALC-MCNC: 6.9 G/DL — CRITICAL LOW (ref 11.5–15.5)
HGB BLD-MCNC: 10 G/DL — LOW (ref 11.5–15.5)
HGB BLD-MCNC: 7.9 G/DL — LOW (ref 11.5–15.5)
HGB BLD-MCNC: 8.1 G/DL — LOW (ref 11.5–15.5)
HGB BLD-MCNC: 8.4 G/DL — LOW (ref 11.5–15.5)
HGB BLD-MCNC: 9.4 G/DL — LOW (ref 11.5–15.5)
HGB BLD-MCNC: 9.6 G/DL — LOW (ref 11.5–15.5)
HGB BLD-MCNC: 9.8 G/DL — LOW (ref 11.5–15.5)
HGB FLD-MCNC: 11.2 G/DL — LOW (ref 11.5–15.5)
HGB FLD-MCNC: 7.7 G/DL — LOW (ref 11.5–15.5)
HGB FLD-MCNC: 9.3 G/DL — LOW (ref 11.5–15.5)
INR BLD: 1.83 — HIGH (ref 0.88–1.16)
INR BLD: 2.03 — HIGH (ref 0.88–1.16)
INR BLD: 2.85 — HIGH (ref 0.88–1.16)
INR BLD: 3.09 — HIGH (ref 0.88–1.16)
INR BLD: 3.75 — HIGH (ref 0.88–1.16)
LACTATE SERPL-SCNC: 3.5 MMOL/L — HIGH (ref 0.5–2)
LACTATE SERPL-SCNC: 4.3 MMOL/L — CRITICAL HIGH (ref 0.5–2)
LACTATE SERPL-SCNC: 4.6 MMOL/L — CRITICAL HIGH (ref 0.5–2)
LACTATE SERPL-SCNC: 4.8 MMOL/L — CRITICAL HIGH (ref 0.5–2)
LACTATE SERPL-SCNC: 4.8 MMOL/L — CRITICAL HIGH (ref 0.5–2)
LACTATE SERPL-SCNC: 5.2 MMOL/L — CRITICAL HIGH (ref 0.5–2)
MAGNESIUM SERPL-MCNC: 1.7 MG/DL — SIGNIFICANT CHANGE UP (ref 1.6–2.6)
MAGNESIUM SERPL-MCNC: 1.7 MG/DL — SIGNIFICANT CHANGE UP (ref 1.6–2.6)
MAGNESIUM SERPL-MCNC: 1.8 MG/DL — SIGNIFICANT CHANGE UP (ref 1.6–2.6)
MAGNESIUM SERPL-MCNC: 1.8 MG/DL — SIGNIFICANT CHANGE UP (ref 1.6–2.6)
MAGNESIUM SERPL-MCNC: 2 MG/DL — SIGNIFICANT CHANGE UP (ref 1.6–2.6)
MAGNESIUM SERPL-MCNC: 2.2 MG/DL — SIGNIFICANT CHANGE UP (ref 1.6–2.6)
MCHC RBC-ENTMCNC: 28 PG — SIGNIFICANT CHANGE UP (ref 27–34)
MCHC RBC-ENTMCNC: 28.2 PG — SIGNIFICANT CHANGE UP (ref 27–34)
MCHC RBC-ENTMCNC: 30.5 PG — SIGNIFICANT CHANGE UP (ref 27–34)
MCHC RBC-ENTMCNC: 32.1 PG — SIGNIFICANT CHANGE UP (ref 27–34)
MCHC RBC-ENTMCNC: 32.1 PG — SIGNIFICANT CHANGE UP (ref 27–34)
MCHC RBC-ENTMCNC: 32.7 PG — SIGNIFICANT CHANGE UP (ref 27–34)
MCHC RBC-ENTMCNC: 32.8 PG — SIGNIFICANT CHANGE UP (ref 27–34)
MCHC RBC-ENTMCNC: 34.9 G/DL — SIGNIFICANT CHANGE UP (ref 32–36)
MCHC RBC-ENTMCNC: 35.7 G/DL — SIGNIFICANT CHANGE UP (ref 32–36)
MCHC RBC-ENTMCNC: 39.5 G/DL — HIGH (ref 32–36)
MCHC RBC-ENTMCNC: 39.7 G/DL — HIGH (ref 32–36)
MCHC RBC-ENTMCNC: 40.3 G/DL — HIGH (ref 32–36)
MCHC RBC-ENTMCNC: 40.6 G/DL — HIGH (ref 32–36)
MCHC RBC-ENTMCNC: 42.5 G/DL — HIGH (ref 32–36)
MCV RBC AUTO: 76.7 FL — LOW (ref 80–100)
MCV RBC AUTO: 77.2 FL — LOW (ref 80–100)
MCV RBC AUTO: 79.1 FL — LOW (ref 80–100)
MCV RBC AUTO: 79.7 FL — LOW (ref 80–100)
MCV RBC AUTO: 80.1 FL — SIGNIFICANT CHANGE UP (ref 80–100)
MCV RBC AUTO: 80.5 FL — SIGNIFICANT CHANGE UP (ref 80–100)
MCV RBC AUTO: 81.3 FL — SIGNIFICANT CHANGE UP (ref 80–100)
METHGB MFR BLDMV: 2.4 % — HIGH
METHGB MFR BLDMV: 4.2 % — HIGH
METHGB MFR BLDMV: 7.4 % — HIGH
METHGB MFR BLDV: 8 % — HIGH
O2 CT VFR BLD CALC: 33 MMHG — SIGNIFICANT CHANGE UP (ref 30–65)
O2 CT VFR BLD CALC: 34 MMHG — SIGNIFICANT CHANGE UP (ref 30–65)
O2 CT VFR BLD CALC: 40 MMHG — SIGNIFICANT CHANGE UP (ref 30–65)
O2 CT VFR BLDMV CALC: 9 ML/DL — SIGNIFICANT CHANGE UP
OXYHGB MFR BLDMV: 68 % — SIGNIFICANT CHANGE UP
OXYHGB MFR BLDMV: 68 % — SIGNIFICANT CHANGE UP
OXYHGB MFR BLDMV: 71 % — SIGNIFICANT CHANGE UP
PCO2 BLDA: 33 MMHG — SIGNIFICANT CHANGE UP (ref 32–45)
PCO2 BLDMV: 27 MMHG — LOW (ref 30–65)
PCO2 BLDMV: 34 MMHG — SIGNIFICANT CHANGE UP (ref 30–65)
PCO2 BLDMV: 39 MMHG — SIGNIFICANT CHANGE UP (ref 30–65)
PCO2 BLDV: 31 MMHG — LOW (ref 41–51)
PCO2 BLDV: 37 MMHG — LOW (ref 41–51)
PCO2 BLDV: 38 MMHG — LOW (ref 41–51)
PH BLDA: 7.43 — SIGNIFICANT CHANGE UP (ref 7.35–7.45)
PH BLDMV: 7.33 — SIGNIFICANT CHANGE UP (ref 7.2–7.45)
PH BLDMV: 7.42 — SIGNIFICANT CHANGE UP (ref 7.2–7.45)
PH BLDMV: 7.45 — SIGNIFICANT CHANGE UP (ref 7.2–7.45)
PH BLDV: 7.39 — SIGNIFICANT CHANGE UP (ref 7.32–7.43)
PH BLDV: 7.46 — HIGH (ref 7.32–7.43)
PH BLDV: 7.46 — HIGH (ref 7.32–7.43)
PHOSPHATE SERPL-MCNC: 3.4 MG/DL — SIGNIFICANT CHANGE UP (ref 2.5–4.5)
PHOSPHATE SERPL-MCNC: 4.3 MG/DL — SIGNIFICANT CHANGE UP (ref 2.5–4.5)
PHOSPHATE SERPL-MCNC: 4.7 MG/DL — HIGH (ref 2.5–4.5)
PHOSPHATE SERPL-MCNC: 4.8 MG/DL — HIGH (ref 2.5–4.5)
PLATELET # BLD AUTO: 114 K/UL — LOW (ref 150–400)
PLATELET # BLD AUTO: 132 K/UL — LOW (ref 150–400)
PLATELET # BLD AUTO: 216 K/UL — SIGNIFICANT CHANGE UP (ref 150–400)
PLATELET # BLD AUTO: 225 K/UL — SIGNIFICANT CHANGE UP (ref 150–400)
PLATELET # BLD AUTO: 297 K/UL — SIGNIFICANT CHANGE UP (ref 150–400)
PLATELET # BLD AUTO: 302 K/UL — SIGNIFICANT CHANGE UP (ref 150–400)
PLATELET # BLD AUTO: 91 K/UL — LOW (ref 150–400)
PO2 BLDA: 48 MMHG — CRITICAL LOW (ref 83–108)
PO2 BLDV: 26 MMHG — SIGNIFICANT CHANGE UP
PO2 BLDV: 30 MMHG — SIGNIFICANT CHANGE UP
PO2 BLDV: 30 MMHG — SIGNIFICANT CHANGE UP
POTASSIUM SERPL-MCNC: 3.5 MMOL/L — SIGNIFICANT CHANGE UP (ref 3.5–5.3)
POTASSIUM SERPL-MCNC: 4 MMOL/L — SIGNIFICANT CHANGE UP (ref 3.5–5.3)
POTASSIUM SERPL-MCNC: 4.1 MMOL/L — SIGNIFICANT CHANGE UP (ref 3.5–5.3)
POTASSIUM SERPL-MCNC: 4.1 MMOL/L — SIGNIFICANT CHANGE UP (ref 3.5–5.3)
POTASSIUM SERPL-MCNC: 4.2 MMOL/L — SIGNIFICANT CHANGE UP (ref 3.5–5.3)
POTASSIUM SERPL-MCNC: 4.4 MMOL/L — SIGNIFICANT CHANGE UP (ref 3.5–5.3)
POTASSIUM SERPL-SCNC: 3.5 MMOL/L — SIGNIFICANT CHANGE UP (ref 3.5–5.3)
POTASSIUM SERPL-SCNC: 4 MMOL/L — SIGNIFICANT CHANGE UP (ref 3.5–5.3)
POTASSIUM SERPL-SCNC: 4.1 MMOL/L — SIGNIFICANT CHANGE UP (ref 3.5–5.3)
POTASSIUM SERPL-SCNC: 4.1 MMOL/L — SIGNIFICANT CHANGE UP (ref 3.5–5.3)
POTASSIUM SERPL-SCNC: 4.2 MMOL/L — SIGNIFICANT CHANGE UP (ref 3.5–5.3)
POTASSIUM SERPL-SCNC: 4.4 MMOL/L — SIGNIFICANT CHANGE UP (ref 3.5–5.3)
PROT SERPL-MCNC: 3.8 G/DL — LOW (ref 6–8.3)
PROT SERPL-MCNC: 4 G/DL — LOW (ref 6–8.3)
PROT SERPL-MCNC: 4.7 G/DL — LOW (ref 6–8.3)
PROT SERPL-MCNC: 4.8 G/DL — LOW (ref 6–8.3)
PROT SERPL-MCNC: 5.5 G/DL — LOW (ref 6–8.3)
PROTHROM AB SERPL-ACNC: 21.1 SEC — HIGH (ref 10–12.9)
PROTHROM AB SERPL-ACNC: 23.4 SEC — HIGH (ref 10–12.9)
PROTHROM AB SERPL-ACNC: 33.3 SEC — HIGH (ref 10–12.9)
PROTHROM AB SERPL-ACNC: 36.2 SEC — HIGH (ref 10–12.9)
PROTHROM AB SERPL-ACNC: 44.1 SEC — HIGH (ref 10–12.9)
RBC # BLD: 2.41 M/UL — LOW (ref 3.8–5.2)
RBC # BLD: 2.57 M/UL — LOW (ref 3.8–5.2)
RBC # BLD: 2.66 M/UL — LOW (ref 3.8–5.2)
RBC # BLD: 2.99 M/UL — LOW (ref 3.8–5.2)
RBC # BLD: 3.05 M/UL — LOW (ref 3.8–5.2)
RBC # BLD: 3.36 M/UL — LOW (ref 3.8–5.2)
RBC # BLD: 3.54 M/UL — LOW (ref 3.8–5.2)
RBC # FLD: 17.4 % — HIGH (ref 10.3–16.9)
RBC # FLD: 17.4 % — HIGH (ref 10.3–16.9)
RBC # FLD: 19.9 % — HIGH (ref 10.3–16.9)
RBC # FLD: 19.9 % — HIGH (ref 10.3–16.9)
RBC # FLD: 21.9 % — HIGH (ref 10.3–16.9)
RBC # FLD: 22.3 % — HIGH (ref 10.3–16.9)
RBC # FLD: 23.5 % — HIGH (ref 10.3–16.9)
SAO2 % BLDA: 85 % — LOW (ref 95–100)
SAO2 % BLDMV: 72 % — SIGNIFICANT CHANGE UP
SAO2 % BLDMV: 75 % — SIGNIFICANT CHANGE UP
SAO2 % BLDMV: 77 % — SIGNIFICANT CHANGE UP
SAO2 % BLDV: 52 % — SIGNIFICANT CHANGE UP
SAO2 % BLDV: 58 % — SIGNIFICANT CHANGE UP
SAO2 % BLDV: 58 % — SIGNIFICANT CHANGE UP
SODIUM SERPL-SCNC: 135 MMOL/L — SIGNIFICANT CHANGE UP (ref 135–145)
SODIUM SERPL-SCNC: 137 MMOL/L — SIGNIFICANT CHANGE UP (ref 135–145)
SODIUM SERPL-SCNC: 138 MMOL/L — SIGNIFICANT CHANGE UP (ref 135–145)
SODIUM SERPL-SCNC: 140 MMOL/L — SIGNIFICANT CHANGE UP (ref 135–145)
VANCOMYCIN FLD-MCNC: 26.7 UG/ML
WBC # BLD: 17.9 K/UL — HIGH (ref 3.8–10.5)
WBC # BLD: 19.9 K/UL — HIGH (ref 3.8–10.5)
WBC # BLD: 19.9 K/UL — HIGH (ref 3.8–10.5)
WBC # BLD: 21.6 K/UL — HIGH (ref 3.8–10.5)
WBC # BLD: 21.8 K/UL — HIGH (ref 3.8–10.5)
WBC # BLD: 22.4 K/UL — HIGH (ref 3.8–10.5)
WBC # BLD: 23.9 K/UL — HIGH (ref 3.8–10.5)
WBC # FLD AUTO: 17.9 K/UL — HIGH (ref 3.8–10.5)
WBC # FLD AUTO: 19.9 K/UL — HIGH (ref 3.8–10.5)
WBC # FLD AUTO: 19.9 K/UL — HIGH (ref 3.8–10.5)
WBC # FLD AUTO: 21.6 K/UL — HIGH (ref 3.8–10.5)
WBC # FLD AUTO: 21.8 K/UL — HIGH (ref 3.8–10.5)
WBC # FLD AUTO: 22.4 K/UL — HIGH (ref 3.8–10.5)
WBC # FLD AUTO: 23.9 K/UL — HIGH (ref 3.8–10.5)

## 2018-12-22 PROCEDURE — 99292 CRITICAL CARE ADDL 30 MIN: CPT

## 2018-12-22 PROCEDURE — 71045 X-RAY EXAM CHEST 1 VIEW: CPT | Mod: 26,76

## 2018-12-22 PROCEDURE — 99291 CRITICAL CARE FIRST HOUR: CPT

## 2018-12-22 RX ORDER — CALCIUM GLUCONATE 100 MG/ML
2 VIAL (ML) INTRAVENOUS ONCE
Qty: 0 | Refills: 0 | Status: COMPLETED | OUTPATIENT
Start: 2018-12-22 | End: 2018-12-23

## 2018-12-22 RX ORDER — CISATRACURIUM BESYLATE 2 MG/ML
10 INJECTION INTRAVENOUS ONCE
Qty: 0 | Refills: 0 | Status: COMPLETED | OUTPATIENT
Start: 2018-12-22 | End: 2018-12-22

## 2018-12-22 RX ORDER — MAGNESIUM SULFATE 500 MG/ML
2 VIAL (ML) INJECTION ONCE
Qty: 0 | Refills: 0 | Status: COMPLETED | OUTPATIENT
Start: 2018-12-22 | End: 2018-12-22

## 2018-12-22 RX ORDER — FENTANYL CITRATE 50 UG/ML
0.5 INJECTION INTRAVENOUS
Qty: 2500 | Refills: 0 | Status: DISCONTINUED | OUTPATIENT
Start: 2018-12-22 | End: 2018-12-22

## 2018-12-22 RX ORDER — METHYLENE BLUE 65 MG
50 TABLET ORAL ONCE
Qty: 0 | Refills: 0 | Status: COMPLETED | OUTPATIENT
Start: 2018-12-22 | End: 2018-12-22

## 2018-12-22 RX ORDER — ACETYLCYSTEINE 200 MG/ML
6 VIAL (ML) MISCELLANEOUS ONCE
Qty: 0 | Refills: 0 | Status: COMPLETED | OUTPATIENT
Start: 2018-12-22 | End: 2018-12-22

## 2018-12-22 RX ORDER — MAGNESIUM SULFATE 500 MG/ML
1 VIAL (ML) INJECTION ONCE
Qty: 0 | Refills: 0 | Status: COMPLETED | OUTPATIENT
Start: 2018-12-22 | End: 2018-12-22

## 2018-12-22 RX ORDER — EPINEPHRINE 0.3 MG/.3ML
0.02 INJECTION INTRAMUSCULAR; SUBCUTANEOUS
Qty: 4 | Refills: 0 | Status: DISCONTINUED | OUTPATIENT
Start: 2018-12-22 | End: 2018-12-25

## 2018-12-22 RX ORDER — METHYLENE BLUE 65 MG
50 TABLET ORAL ONCE
Qty: 0 | Refills: 0 | Status: DISCONTINUED | OUTPATIENT
Start: 2018-12-22 | End: 2018-12-22

## 2018-12-22 RX ORDER — ASCORBIC ACID 60 MG
500 TABLET,CHEWABLE ORAL
Qty: 0 | Refills: 0 | Status: DISCONTINUED | OUTPATIENT
Start: 2018-12-22 | End: 2018-12-27

## 2018-12-22 RX ORDER — PHYTONADIONE (VIT K1) 5 MG
5 TABLET ORAL ONCE
Qty: 0 | Refills: 0 | Status: COMPLETED | OUTPATIENT
Start: 2018-12-22 | End: 2018-12-22

## 2018-12-22 RX ORDER — ALBUMIN HUMAN 25 %
250 VIAL (ML) INTRAVENOUS ONCE
Qty: 0 | Refills: 0 | Status: COMPLETED | OUTPATIENT
Start: 2018-12-22 | End: 2018-12-24

## 2018-12-22 RX ORDER — CALCIUM GLUCONATE 100 MG/ML
1 VIAL (ML) INTRAVENOUS EVERY 6 HOURS
Qty: 0 | Refills: 0 | Status: DISCONTINUED | OUTPATIENT
Start: 2018-12-22 | End: 2018-12-27

## 2018-12-22 RX ORDER — CALCIUM GLUCONATE 100 MG/ML
1 VIAL (ML) INTRAVENOUS EVERY 6 HOURS
Qty: 0 | Refills: 0 | Status: COMPLETED | OUTPATIENT
Start: 2018-12-22 | End: 2018-12-22

## 2018-12-22 RX ORDER — POTASSIUM CHLORIDE 20 MEQ
20 PACKET (EA) ORAL
Qty: 0 | Refills: 0 | Status: COMPLETED | OUTPATIENT
Start: 2018-12-22 | End: 2018-12-23

## 2018-12-22 RX ADMIN — Medication 50 GRAM(S): at 21:14

## 2018-12-22 RX ADMIN — Medication 2.83 MICROGRAM(S)/KG/MIN: at 02:30

## 2018-12-22 RX ADMIN — Medication 5.43 MICROGRAM(S)/KG/MIN: at 02:31

## 2018-12-22 RX ADMIN — ATORVASTATIN CALCIUM 80 MILLIGRAM(S): 80 TABLET, FILM COATED ORAL at 21:15

## 2018-12-22 RX ADMIN — FENTANYL CITRATE 3.02 MICROGRAM(S)/KG/HR: 50 INJECTION INTRAVENOUS at 02:29

## 2018-12-22 RX ADMIN — PIPERACILLIN AND TAZOBACTAM 200 GRAM(S): 4; .5 INJECTION, POWDER, LYOPHILIZED, FOR SOLUTION INTRAVENOUS at 07:22

## 2018-12-22 RX ADMIN — Medication 500 MILLIGRAM(S): at 10:49

## 2018-12-22 RX ADMIN — Medication 101 MILLIGRAM(S): at 12:38

## 2018-12-22 RX ADMIN — Medication 75 MILLIGRAM(S): at 23:00

## 2018-12-22 RX ADMIN — CISATRACURIUM BESYLATE 10 MILLIGRAM(S): 2 INJECTION INTRAVENOUS at 09:00

## 2018-12-22 RX ADMIN — Medication 100 GRAM(S): at 23:10

## 2018-12-22 RX ADMIN — MICAFUNGIN SODIUM 105 MILLIGRAM(S): 100 INJECTION, POWDER, LYOPHILIZED, FOR SOLUTION INTRAVENOUS at 23:12

## 2018-12-22 RX ADMIN — PIPERACILLIN AND TAZOBACTAM 200 GRAM(S): 4; .5 INJECTION, POWDER, LYOPHILIZED, FOR SOLUTION INTRAVENOUS at 12:38

## 2018-12-22 RX ADMIN — Medication 64.38 GRAM(S): at 23:04

## 2018-12-22 RX ADMIN — PIPERACILLIN AND TAZOBACTAM 200 GRAM(S): 4; .5 INJECTION, POWDER, LYOPHILIZED, FOR SOLUTION INTRAVENOUS at 00:52

## 2018-12-22 RX ADMIN — CISATRACURIUM BESYLATE 10 MILLIGRAM(S): 2 INJECTION INTRAVENOUS at 12:00

## 2018-12-22 RX ADMIN — Medication 50 MILLIGRAM(S): at 14:23

## 2018-12-22 RX ADMIN — PIPERACILLIN AND TAZOBACTAM 200 GRAM(S): 4; .5 INJECTION, POWDER, LYOPHILIZED, FOR SOLUTION INTRAVENOUS at 18:15

## 2018-12-22 RX ADMIN — Medication 200 GRAM(S): at 18:42

## 2018-12-22 RX ADMIN — Medication 50 MILLIGRAM(S): at 10:49

## 2018-12-22 RX ADMIN — Medication 250 MILLIGRAM(S): at 19:43

## 2018-12-22 RX ADMIN — VASOPRESSIN 0.6 UNIT(S)/MIN: 20 INJECTION INTRAVENOUS at 02:30

## 2018-12-22 RX ADMIN — INSULIN HUMAN 2 UNIT(S)/HR: 100 INJECTION, SOLUTION SUBCUTANEOUS at 09:58

## 2018-12-22 RX ADMIN — Medication 200 GRAM(S): at 07:23

## 2018-12-22 RX ADMIN — Medication 75 MILLIGRAM(S): at 14:10

## 2018-12-22 RX ADMIN — CHLORHEXIDINE GLUCONATE 1 APPLICATION(S): 213 SOLUTION TOPICAL at 07:23

## 2018-12-22 RX ADMIN — MILRINONE LACTATE 4.52 MICROGRAM(S)/KG/MIN: 1 INJECTION, SOLUTION INTRAVENOUS at 02:30

## 2018-12-22 RX ADMIN — Medication 75 MILLIGRAM(S): at 07:22

## 2018-12-22 RX ADMIN — PANTOPRAZOLE SODIUM 10 MG/HR: 20 TABLET, DELAYED RELEASE ORAL at 02:31

## 2018-12-22 RX ADMIN — Medication 200 GRAM(S): at 23:12

## 2018-12-22 RX ADMIN — Medication 200 GRAM(S): at 12:38

## 2018-12-22 RX ADMIN — Medication 500 MILLIGRAM(S): at 17:56

## 2018-12-22 NOTE — PROGRESS NOTE ADULT - SUBJECTIVE AND OBJECTIVE BOX
CTICU  CRITICAL  CARE  attending     Hand off received 					   Pertinent clinical, laboratory, radiographic, hemodynamic, echocardiographic, respiratory data, microbiologic data and chart were reviewed and analyzed frequently throughout the course of the day and night  Patient seen and examined with CTS/ SH attending at bedside  Pt is a 78y , Female, HEALTH ISSUES - PROBLEM Dx:  Sepsis: Sepsis  Cardiogenic shock: Cardiogenic shock  HOLA (acute kidney injury): HOLA (acute kidney injury)  Hemoptysis: Hemoptysis  Pneumonia: Pneumonia      , FAMILY HISTORY:  No pertinent family history in first degree relatives  PAST MEDICAL & SURGICAL HISTORY:  Asthma, unspecified asthma severity, unspecified whether complicated, unspecified whether persistent  High cholesterol  Hypertension    Patient is a 78y old  Female who presents with a chief complaint of NSTEMI (22 Dec 2018 13:20)      14 system review was unremarkable  acute changes include acute respiratory failure  Vital signs, hemodynamic and respiratory parameters were reviewed from the bedside nursing flowsheet.  ICU Vital Signs Last 24 Hrs  T(C): 35.7 (22 Dec 2018 20:00), Max: 36 (22 Dec 2018 16:00)  T(F): 96.3 (22 Dec 2018 20:00), Max: 96.8 (22 Dec 2018 16:00)  HR: 76 (22 Dec 2018 21:49) (60 - 84)  BP: --  BP(mean): --  ABP: 158/84 (22 Dec 2018 21:45) (90/44 - 158/84)  ABP(mean): 96 (22 Dec 2018 20:00) (58 - 96)  RR: 13 (22 Dec 2018 21:45) (12 - 18)  SpO2: 94% (22 Dec 2018 21:49) (75% - 100%)    Adult Advanced Hemodynamics Last 24 Hrs  CVP(mm Hg): 21 (22 Dec 2018 20:00) (10 - 29)  CVP(cm H2O): --  CO: 3.7 (22 Dec 2018 17:00) (3.7 - 3.9)  CI: 2.2 (22 Dec 2018 17:00) (2.2 - 2.3)  PA: 51/25 (22 Dec 2018 20:00) (24/12 - 57/34)  PA(mean): 35 (22 Dec 2018 20:00) (17 - 43)  PCWP: --  SVR: 1338 (22 Dec 2018 17:00) (1147 - 1426)  SVRI: 2251 (22 Dec 2018 17:00) (9805 - 9425)  PVR: --  PVRI: --, ABG - ( 22 Dec 2018 16:37 )  pH, Arterial: 7.49  pH, Blood: x     /  pCO2: 27    /  pO2: 105   / HCO3: 20    / Base Excess: -2.0  /  SaO2: 98                Mode: AC/ CMV (Assist Control/ Continuous Mandatory Ventilation)  RR (machine): 12  TV (machine): 650  FiO2: 60  PEEP: 5  ITime: 0.65  MAP: 11  PIP: 21    Intake and output was reviewed and the fluid balance was calculated  Daily     Daily   I&O's Summary    21 Dec 2018 07:01  -  22 Dec 2018 07:00  --------------------------------------------------------  IN: 3331.2 mL / OUT: 2083 mL / NET: 1248.2 mL    22 Dec 2018 07:01  -  22 Dec 2018 22:05  --------------------------------------------------------  IN: 3688.7 mL / OUT: 3099 mL / NET: 589.7 mL        All lines and drain sites were assessed  Glycemic trend was reviewedCAPILLARY BLOOD GLUCOSE      POCT Blood Glucose.: 126 mg/dL (22 Dec 2018 21:06)    No acute change in mental status  Auscultation of the chest reveals equal bs  Abdomen is soft  Extremities are warm and well perfused  Wounds appear clean and unremarkable  Antibiotics are periop    labs  CBC Full  -  ( 22 Dec 2018 16:37 )  WBC Count : 19.9 K/uL  Hemoglobin : 9.4 g/dL  Hematocrit : 26.9 %  Platelet Count - Automated : 114 K/uL  Mean Cell Volume : 80.1 fL  Mean Cell Hemoglobin : 28.0 pg  Mean Cell Hemoglobin Concentration : 34.9 g/dL  Auto Neutrophil # : x  Auto Lymphocyte # : x  Auto Monocyte # : x  Auto Eosinophil # : x  Auto Basophil # : x  Auto Neutrophil % : x  Auto Lymphocyte % : x  Auto Monocyte % : x  Auto Eosinophil % : x  Auto Basophil % : x    12-22    138  |  96  |  31<H>  ----------------------------<  109<H>  4.0   |  18<L>  |  1.41<H>    Ca    8.6      22 Dec 2018 16:37  Phos  4.3     12-22  Mg     1.8     12-22    TPro  4.8<L>  /  Alb  2.3<L>  /  TBili  8.7<H>  /  DBili  1.3<H>  /  AST  6304<H>  /  ALT  1574<H>  /  AlkPhos  117  12-22    PT/INR - ( 22 Dec 2018 16:37 )   PT: 23.4 sec;   INR: 2.03          PTT - ( 22 Dec 2018 16:37 )  PTT:34.5 sec  The current medications were reviewed   MEDICATIONS  (STANDING):  acetylcysteine IVPB 6 Gram(s) IV Intermittent once  albumin human  5% IVPB 250 milliLiter(s) IV Intermittent once  amiodarone Infusion 1 mG/Min (33.333 mL/Hr) IV Continuous <Continuous>  ascorbic acid Syrup 500 milliGRAM(s) Oral two times a day  atorvastatin 80 milliGRAM(s) Oral at bedtime  buDESOnide 160 MICROgram(s)/formoterol 4.5 MICROgram(s) Inhaler 2 Puff(s) Inhalation two times a day  calcium gluconate IVPB 1 Gram(s) IV Intermittent every 6 hours  chlorhexidine 2% Cloths 1 Application(s) Topical <User Schedule>  CRRT Treatment    <Continuous>  dextrose 50% Injectable 50 milliLiter(s) IV Push every 15 minutes  DOBUTamine Infusion 3 MICROgram(s)/kG/Min (5.427 mL/Hr) IV Continuous <Continuous>  EPINEPHrine    Infusion 0.02 MICROgram(s)/kG/Min (4.522 mL/Hr) IV Continuous <Continuous>  furosemide Infusion 20 mG/Hr (10 mL/Hr) IV Continuous <Continuous>  hydrocortisone sodium succinate Injectable 75 milliGRAM(s) IV Push every 8 hours  insulin Infusion 2 Unit(s)/Hr (2 mL/Hr) IV Continuous <Continuous>  magnesium sulfate  IVPB 1 Gram(s) IV Intermittent once  micafungin IVPB 100 milliGRAM(s) IV Intermittent every 24 hours  norepinephrine Infusion 0.05 MICROgram(s)/kG/Min (2.827 mL/Hr) IV Continuous <Continuous>  pantoprazole Infusion 8 mG/Hr (10 mL/Hr) IV Continuous <Continuous>  piperacillin/tazobactam IVPB. 3.375 Gram(s) IV Intermittent every 6 hours  propofol Infusion 10 MICROgram(s)/kG/Min (3.618 mL/Hr) IV Continuous <Continuous>  PureFlow Dialysate RFP-400 (K 2 / Ca 3) 5000 milliLiter(s) (2000 mL/Hr) CRRT <Continuous>  sodium chloride 0.9%. 1000 milliLiter(s) (10 mL/Hr) IV Continuous <Continuous>  vancomycin  IVPB 1000 milliGRAM(s) IV Intermittent every 24 hours  vasopressin Infusion 0.01 Unit(s)/Min (0.6 mL/Hr) IV Continuous <Continuous>    MEDICATIONS  (PRN):  ALBUTerol/ipratropium for Nebulization 3 milliLiter(s) Nebulizer every 6 hours PRN Shortness of Breath and/or Wheezing       PROBLEM LIST/ ASSESSMENT:  HEALTH ISSUES - PROBLEM Dx:  Sepsis: Sepsis  Cardiogenic shock: Cardiogenic shock  HOLA (acute kidney injury): HOLA (acute kidney injury)  Hemoptysis: Hemoptysis  Pneumonia: Pneumonia      ,   Patient is a 78y old  Female who presents with a chief complaint of NSTEMI (22 Dec 2018 13:20)     s/p cardiac surgery      My plan includes :  close hemodynamic, ventilatory and drain monitoring and management per post op routine  met Hb levels 8% - dc flavia rx with mblue  Monitor for arrhythmias and monitor parameters for organ perfusion  monitor neurologic status  Head of the bed should remain elevated to 45 deg .   chest PT and IS will be encouraged  monitor adequacy of oxygenation and ventilation and attempt to wean oxygen  Nutritional goals will be met using po eventually , ensure adequate caloric intake and montior the same  Stress ulcer and VTE prophylaxis will be achieved    Glycemic control is satisfactory  Electrolytes have been repleted as necessary and wound care has been carried out. Pain control has been achieved.   agressive physical therapy and early mobility and ambulation goals will be met   The family was updated about the course and plan  CRITICAL CARE TIME SPENT in evaluation and management, reassessments, review and interpretation of labs and x-rays, ventilator and hemodynamic management, formulating a plan and coordinating care: ___90____ MIN.  Time does not include procedural time.  CTICU ATTENDING     					    Aguila Ferro MD

## 2018-12-22 NOTE — PROGRESS NOTE ADULT - PROBLEM SELECTOR PLAN 1
- oliguric, from ATN from cardiogenic shock   - on CVVHD since 12/21 - we will continue today - blood flow 200 ml/min, dialysate flow 2000 ml/h with  ml/hour - the goal to be negative in 24 hours   - last 24 hours UF 1 liter   - volume status on the wet side   - electrolytes noted -   - lactic acidosis from the cardiogenic shock   -

## 2018-12-22 NOTE — PROGRESS NOTE ADULT - ASSESSMENT
78 now in shock s/p cardiac surgery - oliguric HOLA from ATn secondary to shock, still requring pressors and inotrope. ON CVVHD - 1 liter removed, still not stable to be switch to CVVHD. We will continue with CVVHD today

## 2018-12-22 NOTE — PROGRESS NOTE ADULT - SUBJECTIVE AND OBJECTIVE BOX
The patient seen in the morning, still intubated and sedated, oliguric, CVVHD - was running and stopped in the morning for clotting issues, now with 1 liter removal from CVVHD in the past 24 hours     The patient with HOLA - from cardiogenic shock - ATN - oliguric     Patient seen and examined at bedside.     albumin human  5% IVPB 250 milliLiter(s) once  ALBUTerol/ipratropium for Nebulization 3 milliLiter(s) every 6 hours PRN  amiodarone Infusion 1 mG/Min <Continuous>  ascorbic acid Syrup 500 milliGRAM(s) two times a day  atorvastatin 80 milliGRAM(s) at bedtime  buDESOnide 160 MICROgram(s)/formoterol 4.5 MICROgram(s) Inhaler 2 Puff(s) two times a day  calcium gluconate IVPB 1 Gram(s) every 6 hours  chlorhexidine 2% Cloths 1 Application(s) <User Schedule>  CRRT Treatment   <Continuous>  dextrose 50% Injectable 50 milliLiter(s) every 15 minutes  DOBUTamine Infusion 3 MICROgram(s)/kG/Min <Continuous>  EPINEPHrine    Infusion 0.02 MICROgram(s)/kG/Min <Continuous>  furosemide Infusion 20 mG/Hr <Continuous>  hydrocortisone sodium succinate Injectable 75 milliGRAM(s) every 8 hours  insulin Infusion 2 Unit(s)/Hr <Continuous>  methylene blue 0.5% Injectable 50 milliGRAM(s) once  micafungin IVPB 100 milliGRAM(s) every 24 hours  norepinephrine Infusion 0.05 MICROgram(s)/kG/Min <Continuous>  pantoprazole Infusion 8 mG/Hr <Continuous>  piperacillin/tazobactam IVPB. 3.375 Gram(s) every 6 hours  propofol Infusion 10 MICROgram(s)/kG/Min <Continuous>  PureFlow Dialysate RFP-400 (K 2 / Ca 3) 5000 milliLiter(s) <Continuous>  sodium chloride 0.9%. 1000 milliLiter(s) <Continuous>  vancomycin  IVPB 1000 milliGRAM(s) every 24 hours  vasopressin Infusion 0.01 Unit(s)/Min <Continuous>      Allergies    No Known Allergies    Intolerances        T(C): , Max: 35.9 (12-22-18 @ 05:00)  T(F): , Max: 96.6 (12-22-18 @ 05:00)  HR: 60 (12-22-18 @ 13:00)  BP: --  BP(mean): --  RR: 12 (12-22-18 @ 13:00)  SpO2: 75% (12-22-18 @ 13:00)  Wt(kg): --    12-21 @ 07:01  -  12-22 @ 07:00  --------------------------------------------------------  IN:    Albumin 5%  - 250 mL: 500 mL    amiodarone Infusion: 349.7 mL    DOBUTamine Infusion: 147.6 mL    EPINEPHrine Infusion: 9 mL    fentaNYL  Infusion: 18 mL    furosemide Infusion: 40 mL    insulin Infusion: 28 mL    IV PiggyBack: 850 mL    milrinone  Infusion: 90 mL    norepinephrine Infusion: 84.9 mL    Packed Red Blood Cells: 300 mL    pantoprazole Infusion: 240 mL    Plasma: 300 mL    sodium chloride 0.9%.: 280 mL    vasopressin Infusion: 94 mL  Total IN: 3331.2 mL    OUT:    Bulb: 200 mL    Chest Tube: 50 mL    Chest Tube: 38 mL    Chest Tube: 400 mL    Chest Tube: 146 mL    Chest Tube: 100 mL    Indwelling Catheter - Urethral: 87 mL    Other: 1062 mL  Total OUT: 2083 mL    Total NET: 1248.2 mL      12-22 @ 07:01  -  12-22 @ 13:23  --------------------------------------------------------  IN:    Cryoprecipitate: 265 mL    DOBUTamine Infusion: 54 mL    EPINEPHrine Infusion: 36 mL    fentaNYL  Infusion: 6 mL    insulin Infusion: 15 mL    IV PiggyBack: 150 mL    norepinephrine Infusion: 38.4 mL    Packed Red Blood Cells: 700 mL    pantoprazole Infusion: 60 mL    Platelets - Single Donor: 247 mL    sodium chloride 0.9%.: 15 mL    vasopressin Infusion: 24 mL  Total IN: 1610.4 mL    OUT:    Chest Tube: 52 mL    Chest Tube: 80 mL    Chest Tube: 270 mL    Chest Tube: 8 mL    Indwelling Catheter - Urethral: 25 mL    Other: 178 mL  Total OUT: 613 mL    Total NET: 997.4 mL    Physical exam:   Intubated, sesated, on VENt   No JVD   chest s/p thoracotomy - multiple drainges in place  RRR, normal s1/s2, no murmurs, rubs or gallops   Abdomen - soft, not tender, not distended   Extremities: edema 1+          LABS:                        7.9    21.8  )-----------( 297      ( 22 Dec 2018 09:55 )             18.6     12-22    138  |  99  |  33<H>  ----------------------------<  139<H>  4.2   |  16<L>  |  1.61<H>    Ca    7.8<L>      22 Dec 2018 12:22  Phos  4.7     12-22  Mg     1.7     12-22    TPro  4.8<L>  /  Alb  2.3<L>  /  TBili  8.7<H>  /  DBili  1.3<H>  /  AST  6304<H>  /  ALT  1574<H>  /  AlkPhos  117  12-22      PT/INR - ( 22 Dec 2018 12:22 )   PT: 33.3 sec;   INR: 2.85          PTT - ( 22 Dec 2018 12:22 )  PTT:44.3 sec          RADIOLOGY & ADDITIONAL STUDIES:

## 2018-12-23 LAB
ALBUMIN SERPL ELPH-MCNC: 2.8 G/DL — LOW (ref 3.3–5)
ALBUMIN SERPL ELPH-MCNC: 2.8 G/DL — LOW (ref 3.3–5)
ALBUMIN SERPL ELPH-MCNC: 2.9 G/DL — LOW (ref 3.3–5)
ALBUMIN SERPL ELPH-MCNC: 2.9 G/DL — LOW (ref 3.3–5)
ALP SERPL-CCNC: 128 U/L — HIGH (ref 40–120)
ALP SERPL-CCNC: 140 U/L — HIGH (ref 40–120)
ALP SERPL-CCNC: 156 U/L — HIGH (ref 40–120)
ALP SERPL-CCNC: 157 U/L — HIGH (ref 40–120)
ALT FLD-CCNC: 1102 U/L — HIGH (ref 10–45)
ALT FLD-CCNC: 1202 U/L — HIGH (ref 10–45)
ALT FLD-CCNC: 1213 U/L — HIGH (ref 10–45)
ALT FLD-CCNC: 1331 U/L — HIGH (ref 10–45)
ALT FLD-CCNC: 1440 U/L — HIGH (ref 10–45)
ANION GAP SERPL CALC-SCNC: 13 MMOL/L — SIGNIFICANT CHANGE UP (ref 5–17)
ANION GAP SERPL CALC-SCNC: 16 MMOL/L — SIGNIFICANT CHANGE UP (ref 5–17)
ANION GAP SERPL CALC-SCNC: 16 MMOL/L — SIGNIFICANT CHANGE UP (ref 5–17)
ANION GAP SERPL CALC-SCNC: 19 MMOL/L — HIGH (ref 5–17)
APTT BLD: 29.5 SEC — SIGNIFICANT CHANGE UP (ref 27.5–36.3)
APTT BLD: 30.4 SEC — SIGNIFICANT CHANGE UP (ref 27.5–36.3)
APTT BLD: 31.2 SEC — SIGNIFICANT CHANGE UP (ref 27.5–36.3)
APTT BLD: 35 SEC — SIGNIFICANT CHANGE UP (ref 27.5–36.3)
AST SERPL-CCNC: >7000 U/L — HIGH (ref 10–40)
BASE EXCESS BLDA CALC-SCNC: 1.4 MMOL/L — SIGNIFICANT CHANGE UP (ref -2–3)
BASE EXCESS BLDA CALC-SCNC: 1.8 MMOL/L — SIGNIFICANT CHANGE UP (ref -2–3)
BASE EXCESS BLDV CALC-SCNC: 2.2 MMOL/L — SIGNIFICANT CHANGE UP
BASE EXCESS BLDV CALC-SCNC: 2.9 MMOL/L — SIGNIFICANT CHANGE UP
BASE EXCESS BLDV CALC-SCNC: 3.1 MMOL/L — SIGNIFICANT CHANGE UP
BILIRUB DIRECT SERPL-MCNC: 8.8 MG/DL — HIGH (ref 0–0.2)
BILIRUB INDIRECT FLD-MCNC: 13 MG/DL — HIGH (ref 0.2–1)
BILIRUB SERPL-MCNC: 16 MG/DL — HIGH (ref 0.2–1.2)
BILIRUB SERPL-MCNC: 19.7 MG/DL — HIGH (ref 0.2–1.2)
BILIRUB SERPL-MCNC: 21.8 MG/DL — HIGH (ref 0.2–1.2)
BILIRUB SERPL-MCNC: 22.6 MG/DL — HIGH (ref 0.2–1.2)
BLOOD GAS SOURCE: SIGNIFICANT CHANGE UP
BUN SERPL-MCNC: 21 MG/DL — SIGNIFICANT CHANGE UP (ref 7–23)
BUN SERPL-MCNC: 22 MG/DL — SIGNIFICANT CHANGE UP (ref 7–23)
BUN SERPL-MCNC: 23 MG/DL — SIGNIFICANT CHANGE UP (ref 7–23)
BUN SERPL-MCNC: 25 MG/DL — HIGH (ref 7–23)
CALCIUM SERPL-MCNC: 8.7 MG/DL — SIGNIFICANT CHANGE UP (ref 8.4–10.5)
CALCIUM SERPL-MCNC: 9 MG/DL — SIGNIFICANT CHANGE UP (ref 8.4–10.5)
CALCIUM SERPL-MCNC: 9.1 MG/DL — SIGNIFICANT CHANGE UP (ref 8.4–10.5)
CALCIUM SERPL-MCNC: 9.3 MG/DL — SIGNIFICANT CHANGE UP (ref 8.4–10.5)
CHLORIDE SERPL-SCNC: 100 MMOL/L — SIGNIFICANT CHANGE UP (ref 96–108)
CHLORIDE SERPL-SCNC: 95 MMOL/L — LOW (ref 96–108)
CHLORIDE SERPL-SCNC: 97 MMOL/L — SIGNIFICANT CHANGE UP (ref 96–108)
CHLORIDE SERPL-SCNC: 99 MMOL/L — SIGNIFICANT CHANGE UP (ref 96–108)
CO2 SERPL-SCNC: 21 MMOL/L — LOW (ref 22–31)
CO2 SERPL-SCNC: 21 MMOL/L — LOW (ref 22–31)
CO2 SERPL-SCNC: 22 MMOL/L — SIGNIFICANT CHANGE UP (ref 22–31)
CO2 SERPL-SCNC: 22 MMOL/L — SIGNIFICANT CHANGE UP (ref 22–31)
COHGB MFR BLDA: 2.2 % — HIGH
COHGB MFR BLDA: 2.2 % — HIGH
CREAT SERPL-MCNC: 0.92 MG/DL — SIGNIFICANT CHANGE UP (ref 0.5–1.3)
CREAT SERPL-MCNC: 0.95 MG/DL — SIGNIFICANT CHANGE UP (ref 0.5–1.3)
CREAT SERPL-MCNC: 1.02 MG/DL — SIGNIFICANT CHANGE UP (ref 0.5–1.3)
CREAT SERPL-MCNC: 1.15 MG/DL — SIGNIFICANT CHANGE UP (ref 0.5–1.3)
GALACTOMANNAN AG SERPL-ACNC: <0.5 INDEX — SIGNIFICANT CHANGE UP
GAS PNL BLDA: SIGNIFICANT CHANGE UP
GAS PNL BLDV: SIGNIFICANT CHANGE UP
GLUCOSE BLDC GLUCOMTR-MCNC: 101 MG/DL — HIGH (ref 70–99)
GLUCOSE BLDC GLUCOMTR-MCNC: 102 MG/DL — HIGH (ref 70–99)
GLUCOSE BLDC GLUCOMTR-MCNC: 103 MG/DL — HIGH (ref 70–99)
GLUCOSE BLDC GLUCOMTR-MCNC: 74 MG/DL — SIGNIFICANT CHANGE UP (ref 70–99)
GLUCOSE BLDC GLUCOMTR-MCNC: 84 MG/DL — SIGNIFICANT CHANGE UP (ref 70–99)
GLUCOSE BLDC GLUCOMTR-MCNC: 92 MG/DL — SIGNIFICANT CHANGE UP (ref 70–99)
GLUCOSE BLDC GLUCOMTR-MCNC: 92 MG/DL — SIGNIFICANT CHANGE UP (ref 70–99)
GLUCOSE BLDC GLUCOMTR-MCNC: 93 MG/DL — SIGNIFICANT CHANGE UP (ref 70–99)
GLUCOSE BLDC GLUCOMTR-MCNC: 95 MG/DL — SIGNIFICANT CHANGE UP (ref 70–99)
GLUCOSE BLDC GLUCOMTR-MCNC: 97 MG/DL — SIGNIFICANT CHANGE UP (ref 70–99)
GLUCOSE SERPL-MCNC: 100 MG/DL — HIGH (ref 70–99)
GLUCOSE SERPL-MCNC: 100 MG/DL — HIGH (ref 70–99)
GLUCOSE SERPL-MCNC: 109 MG/DL — HIGH (ref 70–99)
GLUCOSE SERPL-MCNC: 95 MG/DL — SIGNIFICANT CHANGE UP (ref 70–99)
HCO3 BLDA-SCNC: 24 MMOL/L — SIGNIFICANT CHANGE UP (ref 21–28)
HCO3 BLDA-SCNC: 24 MMOL/L — SIGNIFICANT CHANGE UP (ref 21–28)
HCO3 BLDV-SCNC: 26 MMOL/L — SIGNIFICANT CHANGE UP (ref 20–27)
HCO3 BLDV-SCNC: 27 MMOL/L — SIGNIFICANT CHANGE UP (ref 20–27)
HCO3 BLDV-SCNC: 27 MMOL/L — SIGNIFICANT CHANGE UP (ref 20–27)
HCT VFR BLD CALC: 28.6 % — LOW (ref 34.5–45)
HCT VFR BLD CALC: 30.3 % — LOW (ref 34.5–45)
HCT VFR BLD CALC: 31.1 % — LOW (ref 34.5–45)
HCT VFR BLD CALC: 31.7 % — LOW (ref 34.5–45)
HGB BLD CALC-MCNC: 11.9 G/DL — SIGNIFICANT CHANGE UP (ref 11.5–15.5)
HGB BLD-MCNC: 10.8 G/DL — LOW (ref 11.5–15.5)
HGB BLD-MCNC: 11.4 G/DL — LOW (ref 11.5–15.5)
HGB BLD-MCNC: 11.5 G/DL — SIGNIFICANT CHANGE UP (ref 11.5–15.5)
HGB BLD-MCNC: 11.5 G/DL — SIGNIFICANT CHANGE UP (ref 11.5–15.5)
HGB BLDA-MCNC: 11.6 G/DL — SIGNIFICANT CHANGE UP (ref 11.5–15.5)
HGB BLDA-MCNC: 11.8 G/DL — SIGNIFICANT CHANGE UP (ref 11.5–15.5)
INR BLD: 1.57 — HIGH (ref 0.88–1.16)
INR BLD: 1.6 — HIGH (ref 0.88–1.16)
INR BLD: 1.71 — HIGH (ref 0.88–1.16)
INR BLD: 1.85 — HIGH (ref 0.88–1.16)
LACTATE SERPL-SCNC: 2.2 MMOL/L — HIGH (ref 0.5–2)
LACTATE SERPL-SCNC: 2.5 MMOL/L — HIGH (ref 0.5–2)
LACTATE SERPL-SCNC: 2.6 MMOL/L — HIGH (ref 0.5–2)
LACTATE SERPL-SCNC: 2.9 MMOL/L — HIGH (ref 0.5–2)
LDH SERPL L TO P-CCNC: >2500 U/L — HIGH (ref 50–242)
LYMPHOCYTES # BLD AUTO: 1 % — LOW (ref 13–44)
MAGNESIUM SERPL-MCNC: 1.9 MG/DL — SIGNIFICANT CHANGE UP (ref 1.6–2.6)
MAGNESIUM SERPL-MCNC: 1.9 MG/DL — SIGNIFICANT CHANGE UP (ref 1.6–2.6)
MAGNESIUM SERPL-MCNC: 2.1 MG/DL — SIGNIFICANT CHANGE UP (ref 1.6–2.6)
MAGNESIUM SERPL-MCNC: 2.4 MG/DL — SIGNIFICANT CHANGE UP (ref 1.6–2.6)
MCHC RBC-ENTMCNC: 28.3 PG — SIGNIFICANT CHANGE UP (ref 27–34)
MCHC RBC-ENTMCNC: 28.5 PG — SIGNIFICANT CHANGE UP (ref 27–34)
MCHC RBC-ENTMCNC: 28.6 PG — SIGNIFICANT CHANGE UP (ref 27–34)
MCHC RBC-ENTMCNC: 29.3 PG — SIGNIFICANT CHANGE UP (ref 27–34)
MCHC RBC-ENTMCNC: 36.2 G/DL — HIGH (ref 32–36)
MCHC RBC-ENTMCNC: 36.3 G/DL — HIGH (ref 32–36)
MCHC RBC-ENTMCNC: 36.6 G/DL — HIGH (ref 32–36)
MCHC RBC-ENTMCNC: 37.6 G/DL — HIGH (ref 32–36)
MCV RBC AUTO: 77.9 FL — LOW (ref 80–100)
MCV RBC AUTO: 78.3 FL — LOW (ref 80–100)
MCV RBC AUTO: 78.7 FL — LOW (ref 80–100)
MCV RBC AUTO: 79 FL — LOW (ref 80–100)
METHGB MFR BLDA: 1.9 % — HIGH
METHGB MFR BLDA: 2.4 % — HIGH
METHGB MFR BLDV: 3.5 % — HIGH (ref 0–1.5)
MONOCYTES NFR BLD AUTO: 3 % — SIGNIFICANT CHANGE UP (ref 2–14)
NEUTROPHILS NFR BLD AUTO: 82 % — HIGH (ref 43–77)
O2 CT VFR BLDA CALC: 16 ML/DL — SIGNIFICANT CHANGE UP (ref 15–23)
O2 CT VFR BLDA CALC: SIGNIFICANT CHANGE UP (ref 15–23)
OXYHGB MFR BLDA: 94 % — SIGNIFICANT CHANGE UP (ref 94–100)
OXYHGB MFR BLDA: 95 % — SIGNIFICANT CHANGE UP (ref 94–100)
PCO2 BLDA: 30 MMHG — LOW (ref 32–45)
PCO2 BLDA: 33 MMHG — SIGNIFICANT CHANGE UP (ref 32–45)
PCO2 BLDV: 38 MMHG — LOW (ref 41–51)
PCO2 BLDV: 39 MMHG — LOW (ref 41–51)
PCO2 BLDV: 40 MMHG — LOW (ref 41–51)
PH BLDA: 7.49 — HIGH (ref 7.35–7.45)
PH BLDA: 7.52 — HIGH (ref 7.35–7.45)
PH BLDV: 7.44 — HIGH (ref 7.32–7.43)
PH BLDV: 7.45 — HIGH (ref 7.32–7.43)
PH BLDV: 7.46 — HIGH (ref 7.32–7.43)
PHOSPHATE SERPL-MCNC: 2.1 MG/DL — LOW (ref 2.5–4.5)
PHOSPHATE SERPL-MCNC: 2.9 MG/DL — SIGNIFICANT CHANGE UP (ref 2.5–4.5)
PLATELET # BLD AUTO: 38 K/UL — LOW (ref 150–400)
PLATELET # BLD AUTO: 41 K/UL — LOW (ref 150–400)
PLATELET # BLD AUTO: 44 K/UL — LOW (ref 150–400)
PLATELET # BLD AUTO: 68 K/UL — LOW (ref 150–400)
PO2 BLDA: 162 MMHG — HIGH (ref 83–108)
PO2 BLDA: 170 MMHG — HIGH (ref 83–108)
PO2 BLDV: 30 MMHG — SIGNIFICANT CHANGE UP
PO2 BLDV: 32 MMHG — SIGNIFICANT CHANGE UP
PO2 BLDV: 33 MMHG — SIGNIFICANT CHANGE UP
POTASSIUM SERPL-MCNC: 3.6 MMOL/L — SIGNIFICANT CHANGE UP (ref 3.5–5.3)
POTASSIUM SERPL-MCNC: 3.7 MMOL/L — SIGNIFICANT CHANGE UP (ref 3.5–5.3)
POTASSIUM SERPL-MCNC: 4.2 MMOL/L — SIGNIFICANT CHANGE UP (ref 3.5–5.3)
POTASSIUM SERPL-MCNC: 4.8 MMOL/L — SIGNIFICANT CHANGE UP (ref 3.5–5.3)
POTASSIUM SERPL-SCNC: 3.6 MMOL/L — SIGNIFICANT CHANGE UP (ref 3.5–5.3)
POTASSIUM SERPL-SCNC: 3.7 MMOL/L — SIGNIFICANT CHANGE UP (ref 3.5–5.3)
POTASSIUM SERPL-SCNC: 4.2 MMOL/L — SIGNIFICANT CHANGE UP (ref 3.5–5.3)
POTASSIUM SERPL-SCNC: 4.8 MMOL/L — SIGNIFICANT CHANGE UP (ref 3.5–5.3)
PROT SERPL-MCNC: 4.6 G/DL — LOW (ref 6–8.3)
PROT SERPL-MCNC: 4.7 G/DL — LOW (ref 6–8.3)
PROT SERPL-MCNC: 4.8 G/DL — LOW (ref 6–8.3)
PROT SERPL-MCNC: 5.1 G/DL — LOW (ref 6–8.3)
PROTHROM AB SERPL-ACNC: 18 SEC — HIGH (ref 10–12.9)
PROTHROM AB SERPL-ACNC: 18.4 SEC — HIGH (ref 10–12.9)
PROTHROM AB SERPL-ACNC: 19.6 SEC — HIGH (ref 10–12.9)
PROTHROM AB SERPL-ACNC: 21.3 SEC — HIGH (ref 10–12.9)
RBC # BLD: 3.62 M/UL — LOW (ref 3.8–5.2)
RBC # BLD: 3.89 M/UL — SIGNIFICANT CHANGE UP (ref 3.8–5.2)
RBC # BLD: 3.97 M/UL — SIGNIFICANT CHANGE UP (ref 3.8–5.2)
RBC # BLD: 4.03 M/UL — SIGNIFICANT CHANGE UP (ref 3.8–5.2)
RBC # FLD: 17.3 % — HIGH (ref 10.3–16.9)
RBC # FLD: 17.4 % — HIGH (ref 10.3–16.9)
RBC # FLD: 17.4 % — HIGH (ref 10.3–16.9)
RBC # FLD: 17.6 % — HIGH (ref 10.3–16.9)
SAO2 % BLDA: 99 % — SIGNIFICANT CHANGE UP (ref 95–100)
SAO2 % BLDA: 99 % — SIGNIFICANT CHANGE UP (ref 95–100)
SAO2 % BLDV: 56 % — SIGNIFICANT CHANGE UP
SAO2 % BLDV: 63 % — SIGNIFICANT CHANGE UP
SAO2 % BLDV: 66 % — SIGNIFICANT CHANGE UP
SODIUM SERPL-SCNC: 135 MMOL/L — SIGNIFICANT CHANGE UP (ref 135–145)
SODIUM SERPL-SCNC: 136 MMOL/L — SIGNIFICANT CHANGE UP (ref 135–145)
WBC # BLD: 20.4 K/UL — HIGH (ref 3.8–10.5)
WBC # BLD: 22 K/UL — HIGH (ref 3.8–10.5)
WBC # BLD: 22.5 K/UL — HIGH (ref 3.8–10.5)
WBC # BLD: 23.2 K/UL — HIGH (ref 3.8–10.5)
WBC # FLD AUTO: 20.4 K/UL — HIGH (ref 3.8–10.5)
WBC # FLD AUTO: 22 K/UL — HIGH (ref 3.8–10.5)
WBC # FLD AUTO: 22.5 K/UL — HIGH (ref 3.8–10.5)
WBC # FLD AUTO: 23.2 K/UL — HIGH (ref 3.8–10.5)

## 2018-12-23 PROCEDURE — 43760 CHANGE GASTROSTOMY TUBE PERCUTANEOUS W/O GUIDE: CPT

## 2018-12-23 PROCEDURE — 99291 CRITICAL CARE FIRST HOUR: CPT | Mod: 24

## 2018-12-23 PROCEDURE — 99292 CRITICAL CARE ADDL 30 MIN: CPT

## 2018-12-23 PROCEDURE — 99291 CRITICAL CARE FIRST HOUR: CPT

## 2018-12-23 PROCEDURE — 71045 X-RAY EXAM CHEST 1 VIEW: CPT | Mod: 26,76

## 2018-12-23 RX ORDER — FENTANYL CITRATE 50 UG/ML
25 INJECTION INTRAVENOUS ONCE
Qty: 0 | Refills: 0 | Status: DISCONTINUED | OUTPATIENT
Start: 2018-12-23 | End: 2018-12-23

## 2018-12-23 RX ORDER — POTASSIUM CHLORIDE 20 MEQ
20 PACKET (EA) ORAL
Qty: 0 | Refills: 0 | Status: COMPLETED | OUTPATIENT
Start: 2018-12-23 | End: 2018-12-23

## 2018-12-23 RX ORDER — FENTANYL CITRATE 50 UG/ML
12.5 INJECTION INTRAVENOUS
Qty: 0 | Refills: 0 | Status: DISCONTINUED | OUTPATIENT
Start: 2018-12-23 | End: 2018-12-25

## 2018-12-23 RX ADMIN — Medication 500 MILLIGRAM(S): at 22:18

## 2018-12-23 RX ADMIN — Medication 200 GRAM(S): at 18:54

## 2018-12-23 RX ADMIN — Medication 500 MILLIGRAM(S): at 07:14

## 2018-12-23 RX ADMIN — PIPERACILLIN AND TAZOBACTAM 200 GRAM(S): 4; .5 INJECTION, POWDER, LYOPHILIZED, FOR SOLUTION INTRAVENOUS at 12:07

## 2018-12-23 RX ADMIN — Medication 75 MILLIGRAM(S): at 14:06

## 2018-12-23 RX ADMIN — PIPERACILLIN AND TAZOBACTAM 200 GRAM(S): 4; .5 INJECTION, POWDER, LYOPHILIZED, FOR SOLUTION INTRAVENOUS at 18:54

## 2018-12-23 RX ADMIN — Medication 200 GRAM(S): at 05:50

## 2018-12-23 RX ADMIN — Medication 75 MILLIGRAM(S): at 05:51

## 2018-12-23 RX ADMIN — PIPERACILLIN AND TAZOBACTAM 200 GRAM(S): 4; .5 INJECTION, POWDER, LYOPHILIZED, FOR SOLUTION INTRAVENOUS at 05:51

## 2018-12-23 RX ADMIN — FENTANYL CITRATE 25 MICROGRAM(S): 50 INJECTION INTRAVENOUS at 16:08

## 2018-12-23 RX ADMIN — FENTANYL CITRATE 25 MICROGRAM(S): 50 INJECTION INTRAVENOUS at 19:40

## 2018-12-23 RX ADMIN — Medication 100 MILLIEQUIVALENT(S): at 04:00

## 2018-12-23 RX ADMIN — ATORVASTATIN CALCIUM 80 MILLIGRAM(S): 80 TABLET, FILM COATED ORAL at 22:18

## 2018-12-23 RX ADMIN — Medication 250 MILLIGRAM(S): at 19:40

## 2018-12-23 RX ADMIN — CHLORHEXIDINE GLUCONATE 1 APPLICATION(S): 213 SOLUTION TOPICAL at 06:52

## 2018-12-23 RX ADMIN — FENTANYL CITRATE 12.5 MICROGRAM(S): 50 INJECTION INTRAVENOUS at 06:00

## 2018-12-23 RX ADMIN — Medication 100 MILLIEQUIVALENT(S): at 13:07

## 2018-12-23 RX ADMIN — Medication 200 GRAM(S): at 12:07

## 2018-12-23 RX ADMIN — FENTANYL CITRATE 25 MICROGRAM(S): 50 INJECTION INTRAVENOUS at 16:07

## 2018-12-23 RX ADMIN — FENTANYL CITRATE 25 MICROGRAM(S): 50 INJECTION INTRAVENOUS at 02:00

## 2018-12-23 RX ADMIN — Medication 200 GRAM(S): at 00:27

## 2018-12-23 RX ADMIN — MICAFUNGIN SODIUM 105 MILLIGRAM(S): 100 INJECTION, POWDER, LYOPHILIZED, FOR SOLUTION INTRAVENOUS at 23:09

## 2018-12-23 RX ADMIN — FENTANYL CITRATE 25 MICROGRAM(S): 50 INJECTION INTRAVENOUS at 19:39

## 2018-12-23 RX ADMIN — Medication 100 MILLIEQUIVALENT(S): at 15:08

## 2018-12-23 RX ADMIN — FENTANYL CITRATE 25 MICROGRAM(S): 50 INJECTION INTRAVENOUS at 03:18

## 2018-12-23 RX ADMIN — PIPERACILLIN AND TAZOBACTAM 200 GRAM(S): 4; .5 INJECTION, POWDER, LYOPHILIZED, FOR SOLUTION INTRAVENOUS at 00:27

## 2018-12-23 RX ADMIN — Medication 100 MILLIEQUIVALENT(S): at 02:30

## 2018-12-23 RX ADMIN — Medication 100 MILLIEQUIVALENT(S): at 00:27

## 2018-12-23 RX ADMIN — Medication 75 MILLIGRAM(S): at 22:18

## 2018-12-23 RX ADMIN — FENTANYL CITRATE 12.5 MICROGRAM(S): 50 INJECTION INTRAVENOUS at 08:30

## 2018-12-23 NOTE — PROCEDURE NOTE - NSPROCDETAILS_GEN_ALL_CORE
65cm/location identified, feeding tube inserted
connected to ventilator/patient pre-oxygenated, tube inserted, placement confirmed
ultrasound guidance
ultrasound guidance/guidewire recovered/sterile dressing applied/lumen(s) aspirated and flushed
Seldinger technique/dressing applied
ultrasound guidance/location identified, draped/prepped, sterile technique used, needle inserted/introduced/all materials/supplies accounted for at end of procedure/sutured in place/hemostasis with direct pressure, dressing applied/connected to a pressurized flush line/positive blood return obtained via catheter
guidewire recovered/sterile technique, catheter placed/sterile dressing applied/ultrasound guidance/lumen(s) aspirated and flushed

## 2018-12-23 NOTE — PROGRESS NOTE ADULT - ASSESSMENT
78 now in shock s/p cardiac surgery - oliguric HOLA from ATn secondary to shock, still requring pressors and inotrope. ON CVVHD -4.9 liters removed, . We will continue with CVVHD today - neagtive 1.4 liters.

## 2018-12-23 NOTE — PROCEDURE NOTE - NSPOSTCAREGUIDE_GEN_A_CORE
Care for catheter as per unit/ICU protocols
Instructed patient/caregiver regarding signs and symptoms of infection
Care for catheter as per unit/ICU protocols

## 2018-12-23 NOTE — PROGRESS NOTE ADULT - SUBJECTIVE AND OBJECTIVE BOX
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,    post op:    acute resp failure  encephalopathy prob 2/2 toxic metabolic  GI bleeding  hemodynamic instability  IABP support  methemoglobinemia  HOLA on RRT    today:    inotropes/pressors  Vasopressin added  IABP support  neuro eval for encephalopathy  RRT              Sepsis: Sepsis  Cardiogenic shock: Cardiogenic shock  HOLA (acute kidney injury): HOLA (acute kidney injury)  Hemoptysis: Hemoptysis  Pneumonia: Pneumonia      , FAMILY HISTORY:  No pertinent family history in first degree relatives  PAST MEDICAL & SURGICAL HISTORY:  Asthma, unspecified asthma severity, unspecified whether complicated, unspecified whether persistent  High cholesterol  Hypertension    Patient is a 78y old  Female who presents with a chief complaint of NSTEMI (24 Dec 2018 17:09)      14 system review was unremarkable  acute changes include acute respiratory failure  Vital signs, hemodynamic and respiratory parameters were reviewed from the bedside nursing flowsheet.  ICU Vital Signs Last 24 Hrs  T(C): 36.1 (24 Dec 2018 11:00), Max: 36.3 (23 Dec 2018 21:00)  T(F): 97 (24 Dec 2018 11:00), Max: 97.3 (23 Dec 2018 21:00)  HR: 78 (24 Dec 2018 16:29) (70 - 94)  BP: --  BP(mean): --  ABP: 102/54 (24 Dec 2018 16:00) (92/60 - 138/64)  ABP(mean): 70 (24 Dec 2018 16:00) (52 - 86)  RR: 12 (24 Dec 2018 16:00) (12 - 12)  SpO2: 96% (24 Dec 2018 16:29) (70% - 100%)    Adult Advanced Hemodynamics Last 24 Hrs  CVP(mm Hg): 11 (24 Dec 2018 16:00) (8 - 16)  CVP(cm H2O): --  CO: 3.5 (24 Dec 2018 11:00) (3.1 - 3.5)  CI: 2 (24 Dec 2018 11:00) (1.8 - 2)  PA: 32/17 (24 Dec 2018 16:00) (25/15 - 38/23)  PA(mean): 23 (24 Dec 2018 16:00) (20 - 29)  PCWP: --  SVR: 1278 (24 Dec 2018 11:00) (1278 - 1623)  SVRI: 2237 (24 Dec 2018 11:00) (5832 - 7535)  PVR: --  PVRI: --, ABG - ( 24 Dec 2018 17:20 )  pH, Arterial: 7.47  pH, Blood: x     /  pCO2: 32    /  pO2: 152   / HCO3: 23    / Base Excess: -0.5  /  SaO2: x                 Mode: AC/ CMV (Assist Control/ Continuous Mandatory Ventilation)  RR (machine): 12  TV (machine): 450  FiO2: 70  PEEP: 8  ITime: 1  MAP: 10  PIP: 22    Intake and output was reviewed and the fluid balance was calculated  Daily     Daily   I&O's Summary    23 Dec 2018 07:01  -  24 Dec 2018 07:00  --------------------------------------------------------  IN: 3209.9 mL / OUT: 6182 mL / NET: -2972.1 mL    24 Dec 2018 07:01  -  24 Dec 2018 17:24  --------------------------------------------------------  IN: 2286.1 mL / OUT: 1466 mL / NET: 820.1 mL        All lines and drain sites were assessed  Glycemic trend was reviewedCAPILLARY BLOOD GLUCOSE      POCT Blood Glucose.: 136 mg/dL (24 Dec 2018 07:24)    No acute change in mental status  Auscultation of the chest reveals equal bs  Abdomen is soft  Extremities are warm and well perfused  Wounds appear clean and unremarkable  Antibiotics are periop    labs  CBC Full  -  ( 24 Dec 2018 15:49 )  WBC Count : 21.0 K/uL  Hemoglobin : 10.2 g/dL  Hematocrit : 28.8 %  Platelet Count - Automated : 30 K/uL  Mean Cell Volume : 80.0 fL  Mean Cell Hemoglobin : 28.3 pg  Mean Cell Hemoglobin Concentration : 35.4 g/dL  Auto Neutrophil # : x  Auto Lymphocyte # : x  Auto Monocyte # : x  Auto Eosinophil # : x  Auto Basophil # : x  Auto Neutrophil % : x  Auto Lymphocyte % : x  Auto Monocyte % : x  Auto Eosinophil % : x  Auto Basophil % : x    12-24    135  |  94<L>  |  23  ----------------------------<  146<H>  3.9   |  20<L>  |  0.85    Ca    9.4      24 Dec 2018 15:49  Phos  2.6     12-24  Mg     2.1     12-24    TPro  4.0<L>  /  Alb  3.1<L>  /  TBili  25.0<H>  /  DBili  x   /  AST  4749<H>  /  ALT  639<H>  /  AlkPhos  186<H>  12-24    PT/INR - ( 24 Dec 2018 15:49 )   PT: 18.2 sec;   INR: 1.59          PTT - ( 24 Dec 2018 15:49 )  PTT:35.9 sec  The current medications were reviewed   MEDICATIONS  (STANDING):  amiodarone Infusion 1 mG/Min (33.333 mL/Hr) IV Continuous <Continuous>  ascorbic acid Syrup 500 milliGRAM(s) Oral two times a day  atorvastatin 80 milliGRAM(s) Oral at bedtime  buDESOnide 160 MICROgram(s)/formoterol 4.5 MICROgram(s) Inhaler 2 Puff(s) Inhalation two times a day  calcium gluconate IVPB 1 Gram(s) IV Intermittent every 6 hours  calcium gluconate IVPB 2 Gram(s) IV Intermittent once  chlorhexidine 0.12% Liquid 15 milliLiter(s) Oral Mucosa two times a day  chlorhexidine 2% Cloths 1 Application(s) Topical <User Schedule>  CRRT Treatment    <Continuous>  dextrose 50% Injectable 50 milliLiter(s) IV Push every 15 minutes  DOBUTamine Infusion 3 MICROgram(s)/kG/Min (5.427 mL/Hr) IV Continuous <Continuous>  EPINEPHrine    Infusion 0.02 MICROgram(s)/kG/Min (4.522 mL/Hr) IV Continuous <Continuous>  hydrocortisone sodium succinate Injectable 75 milliGRAM(s) IV Push every 8 hours  insulin Infusion 2 Unit(s)/Hr (2 mL/Hr) IV Continuous <Continuous>  micafungin IVPB 100 milliGRAM(s) IV Intermittent every 24 hours  milrinone Infusion 0.125 MICROgram(s)/kG/Min (2.261 mL/Hr) IV Continuous <Continuous>  pantoprazole Infusion 8 mG/Hr (10 mL/Hr) IV Continuous <Continuous>  phenylephrine    Infusion 0.2 MICROgram(s)/kG/Min (4.522 mL/Hr) IV Continuous <Continuous>  piperacillin/tazobactam IVPB. 3.375 Gram(s) IV Intermittent every 6 hours  PureFlow Dialysate RFP-400 (K 2 / Ca 3) 5000 milliLiter(s) (1500 mL/Hr) CRRT <Continuous>  sodium chloride 0.9%. 1000 milliLiter(s) (10 mL/Hr) IV Continuous <Continuous>  vancomycin  IVPB 1000 milliGRAM(s) IV Intermittent every 24 hours  vasopressin Infusion 0.01 Unit(s)/Min (0.6 mL/Hr) IV Continuous <Continuous>    MEDICATIONS  (PRN):  ALBUTerol/ipratropium for Nebulization 3 milliLiter(s) Nebulizer every 6 hours PRN Shortness of Breath and/or Wheezing  fentaNYL    Injectable 12.5 MICROGram(s) IV Push every 3 hours PRN Severe Pain (7 - 10)       PROBLEM LIST/ ASSESSMENT:  HEALTH ISSUES - PROBLEM Dx:  acute resp failure  encephalopathy prob 2/2 toxic metabolic  GI bleeding  hemodynamic instability  IABP support  methemoglobinemia  HOLA on RRT    Sepsis: Sepsis  Cardiogenic shock: Cardiogenic shock  HOLA (acute kidney injury): HOLA (acute kidney injury)  Hemoptysis: Hemoptysis  Pneumonia: Pneumonia      ,   Patient is a 78y old  Female who presents with a chief complaint of NSTEMI (24 Dec 2018 17:09)     s/p MVR  acute changes include acute respiratory failure    My plan includes :  close hemodynamic, ventilatory and drain monitoring and management per post op routine    Monitor for arrhythmias and monitor parameters for organ perfusion  monitor neurologic status  Head of the bed should remain elevated to 45 deg .   chest PT and IS will be encouraged  monitor adequacy of oxygenation and ventilation and attempt to wean oxygen  Nutritional goals will be met using po eventually , ensure adequate caloric intake and montior the same  Stress ulcer and VTE prophylaxis will be achieved    Glycemic control is satisfactory  Electrolytes have been repleted as necessary and wound care has been carried out. Pain control has been achieved.   agressive physical therapy and early mobility and ambulation goals will be met   The family was updated about the course and plan  CRITICAL CARE TIME SPENT in evaluation and management, reassessments, review and interpretation of labs and x-rays, ventilator and hemodynamic management, formulating a plan and coordinating care: ___90____ MIN.  Time does not include procedural time.  CTICU ATTENDING     					    James Londono MD

## 2018-12-23 NOTE — PROCEDURE NOTE - NSINDICATIONS_GEN_A_CORE
failure to thrive/respiratory distress/other
critical illness/venous access
hemodynamic monitoring
pneumothorax
respiratory distress
arterial puncture to obtain ABG's/critical patient/monitoring purposes
arterial puncture to obtain ABG's
dialysis/CRRT/critical illness/emergency venous access/venous access

## 2018-12-23 NOTE — PROGRESS NOTE ADULT - SUBJECTIVE AND OBJECTIVE BOX
The patient seen in the morning, still intubated and sedated, oliguric, CVVHD - was running without issues - 4.9 liters off, negative 1.4 liters, Still requiring pressors and inotropes     The patient with HOLA - from cardiogenic shock - ATN - oliguric       Patient seen and examined at bedside.     albumin human  5% IVPB 250 milliLiter(s) once  ALBUTerol/ipratropium for Nebulization 3 milliLiter(s) every 6 hours PRN  amiodarone Infusion 1 mG/Min <Continuous>  ascorbic acid Syrup 500 milliGRAM(s) two times a day  atorvastatin 80 milliGRAM(s) at bedtime  buDESOnide 160 MICROgram(s)/formoterol 4.5 MICROgram(s) Inhaler 2 Puff(s) two times a day  calcium gluconate IVPB 1 Gram(s) every 6 hours  chlorhexidine 2% Cloths 1 Application(s) <User Schedule>  CRRT Treatment   <Continuous>  dextrose 50% Injectable 50 milliLiter(s) every 15 minutes  DOBUTamine Infusion 3 MICROgram(s)/kG/Min <Continuous>  EPINEPHrine    Infusion 0.02 MICROgram(s)/kG/Min <Continuous>  fentaNYL    Injectable 12.5 MICROGram(s) every 3 hours PRN  furosemide Infusion 20 mG/Hr <Continuous>  hydrocortisone sodium succinate Injectable 75 milliGRAM(s) every 8 hours  insulin Infusion 2 Unit(s)/Hr <Continuous>  micafungin IVPB 100 milliGRAM(s) every 24 hours  norepinephrine Infusion 0.05 MICROgram(s)/kG/Min <Continuous>  pantoprazole Infusion 8 mG/Hr <Continuous>  piperacillin/tazobactam IVPB. 3.375 Gram(s) every 6 hours  propofol Infusion 10 MICROgram(s)/kG/Min <Continuous>  PureFlow Dialysate RFP-400 (K 2 / Ca 3) 5000 milliLiter(s) <Continuous>  sodium chloride 0.9%. 1000 milliLiter(s) <Continuous>  vancomycin  IVPB 1000 milliGRAM(s) every 24 hours  vasopressin Infusion 0.01 Unit(s)/Min <Continuous>      Allergies    No Known Allergies    Intolerances      Physical exam:   Intubated, sedated, on VENt   No JVD   chest s/p thoracotomy - multiple drainges in place  RRR, normal s1/s2, no murmurs, rubs or gallops   Abdomen - soft, not tender, not distended   Extremities: edema 1+  HAs collado with mininal urine output   Right IJ - HD catheter            T(C): , Max: 36.1 (12-23-18 @ 09:05)  T(F): , Max: 96.9 (12-23-18 @ 09:05)  HR: 78 (12-23-18 @ 11:00)  BP: --  BP(mean): --  RR: 12 (12-23-18 @ 11:00)  SpO2: 99% (12-23-18 @ 11:00)  Wt(kg): --    12-22 @ 07:01  -  12-23 @ 07:00  --------------------------------------------------------  IN:    Cryoprecipitate: 265 mL    DOBUTamine Infusion: 216 mL    EPINEPHrine Infusion: 38.3 mL    fentaNYL  Infusion: 6 mL    insulin Infusion: 57 mL    IV PiggyBack: 950 mL    norepinephrine Infusion: 38.4 mL    Packed Red Blood Cells: 1400 mL    pantoprazole Infusion: 240 mL    Plasma: 594 mL    Platelets - Single Donor: 247 mL    sodium chloride 0.9%.: 275 mL    Solution: 562.5 mL    vasopressin Infusion: 77 mL  Total IN: 4966.2 mL    OUT:    Bulb: 59 mL    Chest Tube: 38 mL    Chest Tube: 160 mL    Chest Tube: 700 mL    Chest Tube: 272 mL    Indwelling Catheter - Urethral: 33 mL    Nasoenteral Tube: 150 mL    Other: 4890 mL  Total OUT: 6302 mL    Total NET: -1335.8 mL      12-23 @ 07:01  -  12-23 @ 11:33  --------------------------------------------------------  IN:    DOBUTamine Infusion: 36 mL    insulin Infusion: 4 mL    IV PiggyBack: 100 mL    pantoprazole Infusion: 40 mL    sodium chloride 0.9%.: 10 mL    Solution: 250 mL    vasopressin Infusion: 6 mL  Total IN: 446 mL    OUT:    Chest Tube: 50 mL    Chest Tube: 100 mL    Other: 963 mL  Total OUT: 1113 mL    Total NET: -667 mL              LABS:                        10.8   23.2  )-----------( 68       ( 23 Dec 2018 03:04 )             28.6     12-23    135  |  95<L>  |  25<H>  ----------------------------<  100<H>  3.6   |  21<L>  |  1.15    Ca    8.7      23 Dec 2018 03:04  Phos  2.9     12-23  Mg     2.4     12-23    TPro  5.1<L>  /  Alb  2.9<L>  /  TBili  16.0<H>  /  DBili  x   /  AST  >7000<H>  /  ALT  1331<H>  /  AlkPhos  128<H>  12-23      PT/INR - ( 23 Dec 2018 03:04 )   PT: 21.3 sec;   INR: 1.85          PTT - ( 23 Dec 2018 03:04 )  PTT:31.2 sec          RADIOLOGY & ADDITIONAL STUDIES:

## 2018-12-23 NOTE — CHART NOTE - NSCHARTNOTEFT_GEN_A_CORE
ET tube with significant cuff leak.  After CXR was completed and reviewed, the ET tube advanced to 24cm at the lip with resolution of the leak.

## 2018-12-23 NOTE — PROGRESS NOTE ADULT - SUBJECTIVE AND OBJECTIVE BOX
=====================   STROKE ATTENDING NOTE  =====================     GRACE HASKINS   MRN-[id number]  Summary:  78y/F  HPI:  77 yo F w/ HTN, asthma, HLD, GERD, SVT, MVP, osteoporosis, lung nodule (RUL, for 2 years), necrotizing PNA s/p biopsy showing aspergillosis transferred from [facility] w/ NSTEMI for cardiac cath. In october, pt had routine CXR for monitoring of a RUL lung nodule (that shes has had for two years), that showed increase in size. Pt s/p bronchoscopy 10/31 w/ negative biopsy and culture. Pt had repeat bronchoscopy 2 weeks ago at [facility] which revealed aspergillosis. She followed up w/ her outpt pulmonologist 12/13 because she was having hemoptysis and cough (filled 2 tissues with blood), who referred her to [facility] ED. Pt admitted for w/ "moderate hemoptysis" and CT chest showing cavitary lesion concerning for necrotizing PNA. ID consulted, Bcx neg, and pt was started on amphotericin B (bc voriconazole interacts w/ flecainide, so used while cleared her system) for invasive aspergillosis c/b HOLA, n/v. Switched to voriconazole when flecainide cleared her two days later, w/ improvement in Cr. On the night prior to transfer to [facility],, pt spiked fever of 102 with n/v. Bcx drawn, trops I were 50. Pt had no EKG changes, CP, or SOB. Started on coreg, lipitor, lovenox, loaded w/ ASA, plavix. S/p zosyn x1 am of admission for fever. Pt defervesced, hemodynamically stable, 1 episode of paroxysmal afib that resolved s/p diltiazem. Transferred to [facility] for cardiac cath in setting of NSTEMI. On arrival, pt afebrile, tachycardic, with BP in 100s.  Pt denied CP, SOB, fevers, chills, reports stable cough, no current hemoptysis. Denies pain, dysuria, urinary frequency change, diarrhea, cardiac history. (19 Dec 2018 16:03)    Overnight Events: remains intubated, off sedation, on IABP, CVVHD, grimaces and move LUE and Deon LE to pain. Intensivist, PA, NP at bedside    PHYSICAL EXAMINATION  T(C): 36 (12-23 @ 17:00), Max: 36.1 (12-23 @ 09:05)  HR: 84 (12-23 @ 18:00) (60 - 94)  BP: --  RR: 12 (12-23 @ 18:00) (12 - 19)  SpO2: 100% (12-23 @ 18:00) (84% - 100%)  CVP(mm Hg):  (13 - 23)    LABS:  CAPILLARY BLOOD GLUCOSE      POCT Blood Glucose.: 97 mg/dL (23 Dec 2018 16:21)  POCT Blood Glucose.: 92 mg/dL (23 Dec 2018 11:51)  POCT Blood Glucose.: 92 mg/dL (23 Dec 2018 07:50)  POCT Blood Glucose.: 84 mg/dL (23 Dec 2018 07:06)  POCT Blood Glucose.: 95 mg/dL (23 Dec 2018 05:23)  POCT Blood Glucose.: 103 mg/dL (23 Dec 2018 02:56)  POCT Blood Glucose.: 102 mg/dL (23 Dec 2018 01:00)  POCT Blood Glucose.: 101 mg/dL (23 Dec 2018 00:18)  POCT Blood Glucose.: 92 mg/dL (22 Dec 2018 23:15)  POCT Blood Glucose.: 111 mg/dL (22 Dec 2018 22:14)  POCT Blood Glucose.: 126 mg/dL (22 Dec 2018 21:06)  POCT Blood Glucose.: 120 mg/dL (22 Dec 2018 19:56)                          11.5   20.4  )-----------( 38       ( 23 Dec 2018 17:11 )             31.7     12-23    135  |  100  |  22  ----------------------------<  100<H>  4.8   |  22  |  0.95    Ca    9.1      23 Dec 2018 17:11  Phos  2.9     12-23  Mg     1.9     12-23    TPro  4.6<L>  /  Alb  2.8<L>  /  TBili  21.8<H>  /  DBili  8.8<H>  /  AST  >7000<H>  /  ALT  1202<H>  /  AlkPhos  157<H>  12-23 12-22 @ 07:01 - 12-23 @ 07:00  --------------------------------------------------------  IN: 4966.2 mL / OUT: 6302 mL / NET: -1335.8 mL    12-23 @ 07:01  - 12-23 @ 19:22  --------------------------------------------------------  IN: 1344.5 mL / OUT: 2920 mL / NET: -1575.5 mL        MEDICATIONS:  MEDICATIONS  (STANDING):  albumin human  5% IVPB 250 milliLiter(s) IV Intermittent once  amiodarone Infusion 1 mG/Min (33.333 mL/Hr) IV Continuous <Continuous>  ascorbic acid Syrup 500 milliGRAM(s) Oral two times a day  atorvastatin 80 milliGRAM(s) Oral at bedtime  buDESOnide 160 MICROgram(s)/formoterol 4.5 MICROgram(s) Inhaler 2 Puff(s) Inhalation two times a day  calcium gluconate IVPB 1 Gram(s) IV Intermittent every 6 hours  chlorhexidine 2% Cloths 1 Application(s) Topical <User Schedule>  CRRT Treatment    <Continuous>  dextrose 50% Injectable 50 milliLiter(s) IV Push every 15 minutes  DOBUTamine Infusion 3 MICROgram(s)/kG/Min (5.427 mL/Hr) IV Continuous <Continuous>  EPINEPHrine    Infusion 0.02 MICROgram(s)/kG/Min (4.522 mL/Hr) IV Continuous <Continuous>  fentaNYL    Injectable 25 MICROGram(s) IV Push once  furosemide Infusion 20 mG/Hr (10 mL/Hr) IV Continuous <Continuous>  hydrocortisone sodium succinate Injectable 75 milliGRAM(s) IV Push every 8 hours  insulin Infusion 2 Unit(s)/Hr (2 mL/Hr) IV Continuous <Continuous>  micafungin IVPB 100 milliGRAM(s) IV Intermittent every 24 hours  norepinephrine Infusion 0.05 MICROgram(s)/kG/Min (2.827 mL/Hr) IV Continuous <Continuous>  pantoprazole Infusion 8 mG/Hr (10 mL/Hr) IV Continuous <Continuous>  piperacillin/tazobactam IVPB. 3.375 Gram(s) IV Intermittent every 6 hours  propofol Infusion 10 MICROgram(s)/kG/Min (3.618 mL/Hr) IV Continuous <Continuous>  PureFlow Dialysate RFP-400 (K 2 / Ca 3) 5000 milliLiter(s) (2000 mL/Hr) CRRT <Continuous>  sodium chloride 0.9%. 1000 milliLiter(s) (10 mL/Hr) IV Continuous <Continuous>  vancomycin  IVPB 1000 milliGRAM(s) IV Intermittent every 24 hours  vasopressin Infusion 0.01 Unit(s)/Min (0.6 mL/Hr) IV Continuous <Continuous>    MEDICATIONS  (PRN):  ALBUTerol/ipratropium for Nebulization 3 milliLiter(s) Nebulizer every 6 hours PRN Shortness of Breath and/or Wheezing  fentaNYL    Injectable 12.5 MICROGram(s) IV Push every 3 hours PRN Severe Pain (7 - 10)    Neuro:  no sedation, does not follow commands, dysconjugate gaze, no gaze deviation, PERRL, no facial paralysis in frontalis, orbiculari oculi symmetric  R UE plegia + tone, moves rest of limbs to noxious pain

## 2018-12-23 NOTE — PROCEDURE NOTE - NSINFORMCONSENT_GEN_A_CORE
This was an emergent procedure.
From /Benefits, risks, and possible complications of procedure explained to patient/caregiver who verbalized understanding and gave verbal consent.

## 2018-12-23 NOTE — CHART NOTE - NSCHARTNOTEFT_GEN_A_CORE
Left CT with minimal output.  No air leak.  Tolerated clamp trial with no evidence of pneumothorax.  CT removed without incident.  CXR Left pleural surgical CT with minimal output.  No air leak.  Tolerated clamp trial with no evidence of pneumothorax.  CT removed without incident.    Anterior Left pigtail remains in place.  CXR Left pleural surgical CT with minimal output.  No air leak.  Tolerated clamp trial with no evidence of pneumothorax.  CT removed without incident.    Anterior Left pigtail remains in place.  CXR no obvious pneumothorax.  Discussed w Dr. Londono.

## 2018-12-23 NOTE — PROCEDURE NOTE - NSPROCNAME_GEN_A_CORE
Arterial Puncture/Cannulation
Arterial Puncture/Cannulation
Central Line Insertion
Chest Tube
Tracheal Intubation
Central Line Insertion
Feeding Tube Placement
Central Line Insertion

## 2018-12-23 NOTE — PROGRESS NOTE ADULT - PROBLEM SELECTOR PLAN 1
- oliguric, from ATN from cardiogenic shock   - on CVVHD since 12/21 - we will continue today - blood flow 200 ml/min, dialysate flow 2000 ml/h with  ml/hour - the goal to be negative in 24 hours   - last 24 hours UF 4.9 liters off, 1 .4 liter negative  - volume status on the wet side   - electrolytes noted -   - lactic acidosis from the cardiogenic shock   - in and outs   - daily weights

## 2018-12-23 NOTE — PROCEDURE NOTE - NSTIMEOUT_GEN_A_CORE
Patient's first and last name, , procedure, and correct site confirmed prior to the start of procedure.

## 2018-12-23 NOTE — PROCEDURE NOTE - PROCEDURE DATE TIME, MLM
20-Dec-2018 03:20
21-Dec-2018
21-Dec-2018
20-Dec-2018 05:00
20-Dec-2018 05:30
21-Dec-2018 03:00
23-Dec-2018
20-Dec-2018 07:00

## 2018-12-23 NOTE — PROGRESS NOTE ADULT - ASSESSMENT
79 yo F w/ multiorgan failure from cardiogenic shock is not moving her R UE. Possible cardioembolic event to L MCA motor strip,     cont to monitor neuro exam as able  recommend -160 if able   ASA if and when able given coagulaopathy and recent GI bleed  CT head when able  consider EEG if not awakening    I spent 35 minutes of critical care time examining patient, reviewing vitals, labs, medications, imaging and discussing with the team goals of care to prevent life-threatening in this patient who is at high risk for neurological deterioration or death due to:  stroke

## 2018-12-23 NOTE — PROGRESS NOTE ADULT - SUBJECTIVE AND OBJECTIVE BOX
CTICU  CRITICAL  CARE  attending     Hand off received 					   Pertinent clinical, laboratory, radiographic, hemodynamic, echocardiographic, respiratory data, microbiologic data and chart were reviewed and analyzed frequently throughout the course of the day and night  Patient seen and examined with CTS/ SH attending at bedside    79 yo F w/ HTN, asthma, HLD, GERD, SVT, MVP, osteoporosis, lung nodule (RUL, for 2 years), necrotizing PNA s/p biopsy showing aspergillosis transferred from [facility] w/ NSTEMI for cardiac cath. In october, pt had routine CXR for monitoring of a RUL lung nodule (that shes has had for two years), that showed increase in size. Pt s/p bronchoscopy 10/31 w/ negative biopsy and culture. Pt had repeat bronchoscopy 2 weeks ago at [facility] which revealed aspergillosis. She followed up w/ her outpt pulmonologist 12/13 because she was having hemoptysis and cough (filled 2 tissues with blood), who referred her to [facility] ED. Pt admitted for w/ "moderate hemoptysis" and CT chest showing cavitary lesion concerning for necrotizing PNA. ID consulted, Bcx neg, and pt was started on amphotericin B (bc voriconazole interacts w/ flecainide, so used while cleared her system) for invasive aspergillosis c/b HOLA, n/v. Switched to voriconazole when flecainide cleared her two days later, w/ improvement in Cr. On the night prior to transfer to [facility],, pt spiked fever of 102 with n/v. Bcx drawn, trops I were 50. Pt had no EKG changes, CP, or SOB. Started on coreg, lipitor, lovenox, loaded w/ ASA, plavix. S/p zosyn x1 am of admission for fever. Pt defervesced, hemodynamically stable, 1 episode of paroxysmal afib that resolved s/p diltiazem. Transferred to [facility] for cardiac cath in setting of NSTEMI. On arrival, pt afebrile, tachycardic, with BP in 100s.  Pt denied CP, SOB, fevers, chills, reports stable cough, no current hemoptysis. Denies pain, dysuria, urinary frequency change, diarrhea, cardiac history.         HEALTH ISSUES - PROBLEM Dx:  Sepsis: Sepsis  Cardiogenic shock: Cardiogenic shock  HOLA (acute kidney injury): HOLA (acute kidney injury)  Hemoptysis: Hemoptysis  Pneumonia: Pneumonia      FAMILY HISTORY:  No pertinent family history in first degree relatives  PAST MEDICAL & SURGICAL HISTORY:  Asthma, unspecified asthma severity, unspecified whether complicated, unspecified whether persistent  High cholesterol  Hypertension    Patient is a 78y old  Female who presents with a chief complaint of NSTEMI (23 Dec 2018 19:21)      14 system review was unremarkable  acute changes include acute respiratory failure  Vital signs, hemodynamic and respiratory parameters were reviewed from the bedside nursing flow sheet.  ICU Vital Signs Last 24 Hrs  T(C): 36 (23 Dec 2018 17:00), Max: 36.1 (23 Dec 2018 09:05)  T(F): 96.8 (23 Dec 2018 17:00), Max: 96.9 (23 Dec 2018 09:05)  HR: 84 (23 Dec 2018 22:00) (60 - 94)  BP: --  BP(mean): --  ABP: 124/50 (23 Dec 2018 22:00) (92/60 - 154/58)  ABP(mean): 72 (23 Dec 2018 22:00) (68 - 106)  RR: 12 (23 Dec 2018 22:00) (12 - 19)  SpO2: 97% (23 Dec 2018 22:00) (94% - 100%)    Adult Advanced Hemodynamics Last 24 Hrs  CVP(mm Hg): 12 (23 Dec 2018 22:00) (12 - 18)  CVP(cm H2O): --  CO: 3.3 (23 Dec 2018 07:00) (3.3 - 3.9)  CI: 2 (23 Dec 2018 07:00) (2 - 2.3)  PA: 34/20 (23 Dec 2018 22:00) (33/20 - 51/25)  PA(mean): 25 (23 Dec 2018 22:00) (25 - 34)  PCWP: --  SVR: 1572 (23 Dec 2018 07:00) (1572 - 1783)  SVRI: 2634 (23 Dec 2018 07:00) (2634 - 2983)  PVR: --  PVRI: --, ABG - ( 23 Dec 2018 21:51 )  pH, Arterial: 7.48  pH, Blood: x     /  pCO2: 32    /  pO2: 116   / HCO3: 24    / Base Excess: 0.8   /  SaO2: 98                Mode: AC/ CMV (Assist Control/ Continuous Mandatory Ventilation)  RR (machine): 12  TV (machine): 450  FiO2: 50  PEEP: 8  ITime: 1  MAP: 11  PIP: 21    Intake and output was reviewed and the fluid balance was calculated  Daily     Daily   I&O's Summary    22 Dec 2018 07:01  -  23 Dec 2018 07:00  --------------------------------------------------------  IN: 4966.2 mL / OUT: 6302 mL / NET: -1335.8 mL    23 Dec 2018 07:01  -  23 Dec 2018 22:15  --------------------------------------------------------  IN: 1866.5 mL / OUT: 3406 mL / NET: -1539.5 mL        All lines and drain sites were assessed  Glycemic trend was reviewed CAPILLARY BLOOD GLUCOSE      POCT Blood Glucose.: 74 mg/dL (23 Dec 2018 19:45)    NEURO: No acute change in mental status FROM YESTERDAY. Does not follow commands. Pupils 5 mm (Reactive). + Scleral Icterus.   HEENT: The togue is swollen with hemorrhagic mucosa.  Auscultation of the chest reveals bilateral  rales.  GI; Abdomen is soft. No tenderness. Hypoactive bowel sounds.  Extremities are warm and well perfused. + Lower extremity edema.  VASCULAR" + Distal pulses.  Wounds appear clean and unremarkable      labs  CBC Full  -  ( 23 Dec 2018 17:11 )  WBC Count : 20.4 K/uL  Hemoglobin : 11.5 g/dL  Hematocrit : 31.7 %  Platelet Count - Automated : 38 K/uL  Mean Cell Volume : 78.7 fL  Mean Cell Hemoglobin : 28.5 pg  Mean Cell Hemoglobin Concentration : 36.3 g/dL  Auto Neutrophil # : x  Auto Lymphocyte # : x  Auto Monocyte # : x  Auto Eosinophil # : x  Auto Basophil # : x  Auto Neutrophil % : x  Auto Lymphocyte % : x  Auto Monocyte % : x  Auto Eosinophil % : x  Auto Basophil % : x    12-23    135  |  97  |  21  ----------------------------<  109<H>  4.2   |  22  |  0.92    Ca    9.3      23 Dec 2018 21:38  Phos  2.1     12-23  Mg     1.9     12-23    TPro  4.7<L>  /  Alb  2.9<L>  /  TBili  22.6<H>  /  DBili  x   /  AST  >7000<H>  /  ALT  1102<H>  /  AlkPhos  156<H>  12-23    PT/INR - ( 23 Dec 2018 21:38 )   PT: 18.0 sec;   INR: 1.57          PTT - ( 23 Dec 2018 21:38 )  PTT:35.0 sec  The current medications were reviewed   MEDICATIONS  (STANDING):  albumin human  5% IVPB 250 milliLiter(s) IV Intermittent once  amiodarone Infusion 1 mG/Min (33.333 mL/Hr) IV Continuous <Continuous>  ascorbic acid Syrup 500 milliGRAM(s) Oral two times a day  atorvastatin 80 milliGRAM(s) Oral at bedtime  buDESOnide 160 MICROgram(s)/formoterol 4.5 MICROgram(s) Inhaler 2 Puff(s) Inhalation two times a day  calcium gluconate IVPB 1 Gram(s) IV Intermittent every 6 hours  chlorhexidine 2% Cloths 1 Application(s) Topical <User Schedule>  CRRT Treatment    <Continuous>  dextrose 50% Injectable 50 milliLiter(s) IV Push every 15 minutes  DOBUTamine Infusion 3 MICROgram(s)/kG/Min (5.427 mL/Hr) IV Continuous <Continuous>  EPINEPHrine    Infusion 0.02 MICROgram(s)/kG/Min (4.522 mL/Hr) IV Continuous <Continuous>  furosemide Infusion 20 mG/Hr (10 mL/Hr) IV Continuous <Continuous>  hydrocortisone sodium succinate Injectable 75 milliGRAM(s) IV Push every 8 hours  insulin Infusion 2 Unit(s)/Hr (2 mL/Hr) IV Continuous <Continuous>  micafungin IVPB 100 milliGRAM(s) IV Intermittent every 24 hours  norepinephrine Infusion 0.05 MICROgram(s)/kG/Min (2.827 mL/Hr) IV Continuous <Continuous>  pantoprazole Infusion 8 mG/Hr (10 mL/Hr) IV Continuous <Continuous>  piperacillin/tazobactam IVPB. 3.375 Gram(s) IV Intermittent every 6 hours  propofol Infusion 10 MICROgram(s)/kG/Min (3.618 mL/Hr) IV Continuous <Continuous>  PureFlow Dialysate RFP-400 (K 2 / Ca 3) 5000 milliLiter(s) (2000 mL/Hr) CRRT <Continuous>  sodium chloride 0.9%. 1000 milliLiter(s) (10 mL/Hr) IV Continuous <Continuous>  vancomycin  IVPB 1000 milliGRAM(s) IV Intermittent every 24 hours  vasopressin Infusion 0.01 Unit(s)/Min (0.6 mL/Hr) IV Continuous <Continuous>    MEDICATIONS  (PRN):  ALBUTerol/ipratropium for Nebulization 3 milliLiter(s) Nebulizer every 6 hours PRN Shortness of Breath and/or Wheezing  fentaNYL    Injectable 12.5 MICROGram(s) IV Push every 3 hours PRN Severe Pain (7 - 10)      PROBLEM LIST/ ASSESSMENT:  HEALTH ISSUES - PROBLEM Dx:  Sepsis: Sepsis  Cardiogenic shock: Cardiogenic shock  HOLA (acute kidney injury): HOLA (acute kidney injury)  Hemoptysis: Hemoptysis  Pneumonia: Pneumonia        Patient is a 78y old  Female who presented  with NSTEMI, sever acute MR due to ruptured chordae tendinae.   Transferred to [facility] with cardiogenic shock.  S/P Emergent mitral valve replacement.  + Encephalopath due to decompensated liver disease.  Acute Renal failure.  Metabolic acidosis.  Hemodynamically stable.  Tolerating CVVHD.  Good oxygenation.        My plan includes :  Continue broad spectrum IV antibiotics.  Continue CVVHD.  Continue full vent support.  Monitir LFT's.   Close hemodynamic, ventilatory and drain monitoring and management.  Monitor for arrhythmias and monitor parameters for organ perfusion  Monitor neurologic status  Head of the bed should remain elevated to 45 deg .   Chest PT and IS will be encouraged  Monitor adequacy of oxygenation and ventilation and attempt to wean oxygen  Nutritional goals will be met using po eventually , ensure adequate caloric intake and monitor  the same  Stress ulcer and VTE prophylaxis will be achieved    Glycemic control is satisfactory  Electrolytes have been repleted as necessary and wound care has been carried out. Pain control has been achieved.   Aggressive  physical therapy and early mobility and ambulation goals will be met   The family was updated about the course and plan  CRITICAL CARE TIME SPENT in evaluation and management, reassessments, review and interpretation of labs and x-rays, ventilator and hemodynamic management, formulating a plan and coordinating care: ___60____ MIN.  Time does not include procedural time.  CTICU ATTENDING     					    Javier Heaton MD

## 2018-12-23 NOTE — PROCEDURE NOTE - NSSITEPREP_SKIN_A_CORE
Adherence to aseptic technique: hand hygiene prior to donning barriers (gown, gloves), don cap and mask, sterile drape over patient
chlorhexidine
chlorhexidine/Adherence to aseptic technique: hand hygiene prior to donning barriers (gown, gloves), don cap and mask, sterile drape over patient

## 2018-12-24 LAB
A FLAVUS AB FLD QL: NEGATIVE — SIGNIFICANT CHANGE UP
A FLAVUS AB FLD QL: NEGATIVE — SIGNIFICANT CHANGE UP
A NIGER AB FLD QL: NEGATIVE — SIGNIFICANT CHANGE UP
ALBUMIN SERPL ELPH-MCNC: 2.7 G/DL — LOW (ref 3.3–5)
ALBUMIN SERPL ELPH-MCNC: 2.9 G/DL — LOW (ref 3.3–5)
ALBUMIN SERPL ELPH-MCNC: 3.1 G/DL — LOW (ref 3.3–5)
ALBUMIN SERPL ELPH-MCNC: 3.3 G/DL — SIGNIFICANT CHANGE UP (ref 3.3–5)
ALP SERPL-CCNC: 172 U/L — HIGH (ref 40–120)
ALP SERPL-CCNC: 183 U/L — HIGH (ref 40–120)
ALP SERPL-CCNC: 186 U/L — HIGH (ref 40–120)
ALP SERPL-CCNC: 208 U/L — HIGH (ref 40–120)
ALT FLD-CCNC: 1014 U/L — HIGH (ref 10–45)
ALT FLD-CCNC: 573 U/L — HIGH (ref 10–45)
ALT FLD-CCNC: 639 U/L — HIGH (ref 10–45)
ALT FLD-CCNC: 923 U/L — HIGH (ref 10–45)
ANION GAP SERPL CALC-SCNC: 14 MMOL/L — SIGNIFICANT CHANGE UP (ref 5–17)
ANION GAP SERPL CALC-SCNC: 17 MMOL/L — SIGNIFICANT CHANGE UP (ref 5–17)
ANION GAP SERPL CALC-SCNC: 19 MMOL/L — HIGH (ref 5–17)
ANION GAP SERPL CALC-SCNC: 21 MMOL/L — HIGH (ref 5–17)
APTT BLD: 31.2 SEC — SIGNIFICANT CHANGE UP (ref 27.5–36.3)
APTT BLD: 33.1 SEC — SIGNIFICANT CHANGE UP (ref 27.5–36.3)
APTT BLD: 35.9 SEC — SIGNIFICANT CHANGE UP (ref 27.5–36.3)
APTT BLD: 39.8 SEC — HIGH (ref 27.5–36.3)
AST SERPL-CCNC: 4312 U/L — HIGH (ref 10–40)
AST SERPL-CCNC: 4749 U/L — HIGH (ref 10–40)
AST SERPL-CCNC: 5974 U/L — HIGH (ref 10–40)
AST SERPL-CCNC: 6642 U/L — HIGH (ref 10–40)
BASE EXCESS BLDA CALC-SCNC: -0.4 MMOL/L — SIGNIFICANT CHANGE UP (ref -2–3)
BASE EXCESS BLDA CALC-SCNC: -0.5 MMOL/L — SIGNIFICANT CHANGE UP (ref -2–3)
BASE EXCESS BLDA CALC-SCNC: 0.2 MMOL/L — SIGNIFICANT CHANGE UP (ref -2–3)
BASE EXCESS BLDV CALC-SCNC: 0.6 MMOL/L — SIGNIFICANT CHANGE UP
BASE EXCESS BLDV CALC-SCNC: 0.6 MMOL/L — SIGNIFICANT CHANGE UP
BILIRUB SERPL-MCNC: 23.7 MG/DL — HIGH (ref 0.2–1.2)
BILIRUB SERPL-MCNC: 24.9 MG/DL — HIGH (ref 0.2–1.2)
BILIRUB SERPL-MCNC: 25 MG/DL — HIGH (ref 0.2–1.2)
BILIRUB SERPL-MCNC: 28.1 MG/DL — HIGH (ref 0.2–1.2)
BLD GP AB SCN SERPL QL: NEGATIVE — SIGNIFICANT CHANGE UP
BUN SERPL-MCNC: 22 MG/DL — SIGNIFICANT CHANGE UP (ref 7–23)
BUN SERPL-MCNC: 23 MG/DL — SIGNIFICANT CHANGE UP (ref 7–23)
CALCIUM SERPL-MCNC: 9.1 MG/DL — SIGNIFICANT CHANGE UP (ref 8.4–10.5)
CALCIUM SERPL-MCNC: 9.1 MG/DL — SIGNIFICANT CHANGE UP (ref 8.4–10.5)
CALCIUM SERPL-MCNC: 9.4 MG/DL — SIGNIFICANT CHANGE UP (ref 8.4–10.5)
CALCIUM SERPL-MCNC: 9.9 MG/DL — SIGNIFICANT CHANGE UP (ref 8.4–10.5)
CHLORIDE SERPL-SCNC: 101 MMOL/L — SIGNIFICANT CHANGE UP (ref 96–108)
CHLORIDE SERPL-SCNC: 94 MMOL/L — LOW (ref 96–108)
CHLORIDE SERPL-SCNC: 95 MMOL/L — LOW (ref 96–108)
CHLORIDE SERPL-SCNC: 98 MMOL/L — SIGNIFICANT CHANGE UP (ref 96–108)
CO2 SERPL-SCNC: 20 MMOL/L — LOW (ref 22–31)
CO2 SERPL-SCNC: 20 MMOL/L — LOW (ref 22–31)
CO2 SERPL-SCNC: 21 MMOL/L — LOW (ref 22–31)
CO2 SERPL-SCNC: 21 MMOL/L — LOW (ref 22–31)
COHGB MFR BLDA: 0.5 % — SIGNIFICANT CHANGE UP
COHGB MFR BLDA: 0.6 % — SIGNIFICANT CHANGE UP
COHGB MFR BLDA: 2.4 % — HIGH
CREAT SERPL-MCNC: 0.8 MG/DL — SIGNIFICANT CHANGE UP (ref 0.5–1.3)
CREAT SERPL-MCNC: 0.85 MG/DL — SIGNIFICANT CHANGE UP (ref 0.5–1.3)
CREAT SERPL-MCNC: 0.88 MG/DL — SIGNIFICANT CHANGE UP (ref 0.5–1.3)
CREAT SERPL-MCNC: 0.9 MG/DL — SIGNIFICANT CHANGE UP (ref 0.5–1.3)
GAS PNL BLDA: SIGNIFICANT CHANGE UP
GAS PNL BLDMV: SIGNIFICANT CHANGE UP
GAS PNL BLDV: SIGNIFICANT CHANGE UP
GAS PNL BLDV: SIGNIFICANT CHANGE UP
GLUCOSE BLDC GLUCOMTR-MCNC: 111 MG/DL — HIGH (ref 70–99)
GLUCOSE BLDC GLUCOMTR-MCNC: 136 MG/DL — HIGH (ref 70–99)
GLUCOSE SERPL-MCNC: 112 MG/DL — HIGH (ref 70–99)
GLUCOSE SERPL-MCNC: 137 MG/DL — HIGH (ref 70–99)
GLUCOSE SERPL-MCNC: 146 MG/DL — HIGH (ref 70–99)
GLUCOSE SERPL-MCNC: 151 MG/DL — HIGH (ref 70–99)
HCO3 BLDA-SCNC: 22 MMOL/L — SIGNIFICANT CHANGE UP (ref 21–28)
HCO3 BLDA-SCNC: 23 MMOL/L — SIGNIFICANT CHANGE UP (ref 21–28)
HCO3 BLDA-SCNC: 23 MMOL/L — SIGNIFICANT CHANGE UP (ref 21–28)
HCO3 BLDV-SCNC: 24 MMOL/L — SIGNIFICANT CHANGE UP (ref 20–27)
HCO3 BLDV-SCNC: 25 MMOL/L — SIGNIFICANT CHANGE UP (ref 20–27)
HCT VFR BLD CALC: 28.8 % — LOW (ref 34.5–45)
HCT VFR BLD CALC: 29.9 % — LOW (ref 34.5–45)
HCT VFR BLD CALC: 31.2 % — LOW (ref 34.5–45)
HCT VFR BLD CALC: 31.8 % — LOW (ref 34.5–45)
HGB BLD-MCNC: 10.2 G/DL — LOW (ref 11.5–15.5)
HGB BLD-MCNC: 10.7 G/DL — LOW (ref 11.5–15.5)
HGB BLD-MCNC: 11.6 G/DL — SIGNIFICANT CHANGE UP (ref 11.5–15.5)
HGB BLD-MCNC: 11.7 G/DL — SIGNIFICANT CHANGE UP (ref 11.5–15.5)
HGB BLDA-MCNC: 10.7 G/DL — LOW (ref 11.5–15.5)
HGB BLDA-MCNC: 12.4 G/DL — SIGNIFICANT CHANGE UP (ref 11.5–15.5)
HGB BLDA-MCNC: 9.3 G/DL — LOW (ref 11.5–15.5)
INR BLD: 1.42 — HIGH (ref 0.88–1.16)
INR BLD: 1.52 — HIGH (ref 0.88–1.16)
INR BLD: 1.56 — HIGH (ref 0.88–1.16)
INR BLD: 1.59 — HIGH (ref 0.88–1.16)
LACTATE SERPL-SCNC: 3.1 MMOL/L — HIGH (ref 0.5–2)
LACTATE SERPL-SCNC: 3.2 MMOL/L — HIGH (ref 0.5–2)
LACTATE SERPL-SCNC: 3.9 MMOL/L — HIGH (ref 0.5–2)
LACTATE SERPL-SCNC: 3.9 MMOL/L — HIGH (ref 0.5–2)
LDH SERPL L TO P-CCNC: >2500 U/L — SIGNIFICANT CHANGE UP (ref 50–242)
MAGNESIUM SERPL-MCNC: 2.1 MG/DL — SIGNIFICANT CHANGE UP (ref 1.6–2.6)
MAGNESIUM SERPL-MCNC: 2.2 MG/DL — SIGNIFICANT CHANGE UP (ref 1.6–2.6)
MAGNESIUM SERPL-MCNC: 2.2 MG/DL — SIGNIFICANT CHANGE UP (ref 1.6–2.6)
MAGNESIUM SERPL-MCNC: 2.6 MG/DL — SIGNIFICANT CHANGE UP (ref 1.6–2.6)
MCHC RBC-ENTMCNC: 27.8 PG — SIGNIFICANT CHANGE UP (ref 27–34)
MCHC RBC-ENTMCNC: 28.3 PG — SIGNIFICANT CHANGE UP (ref 27–34)
MCHC RBC-ENTMCNC: 29.1 PG — SIGNIFICANT CHANGE UP (ref 27–34)
MCHC RBC-ENTMCNC: 29.3 PG — SIGNIFICANT CHANGE UP (ref 27–34)
MCHC RBC-ENTMCNC: 35.2 G/DL — SIGNIFICANT CHANGE UP (ref 32–36)
MCHC RBC-ENTMCNC: 35.4 G/DL — SIGNIFICANT CHANGE UP (ref 32–36)
MCHC RBC-ENTMCNC: 35.8 G/DL — SIGNIFICANT CHANGE UP (ref 32–36)
MCHC RBC-ENTMCNC: 36.8 G/DL — HIGH (ref 32–36)
MCV RBC AUTO: 79 FL — LOW (ref 80–100)
MCV RBC AUTO: 79.5 FL — LOW (ref 80–100)
MCV RBC AUTO: 80 FL — SIGNIFICANT CHANGE UP (ref 80–100)
MCV RBC AUTO: 81.3 FL — SIGNIFICANT CHANGE UP (ref 80–100)
METHGB MFR BLDA: 1.3 % — SIGNIFICANT CHANGE UP
METHGB MFR BLDA: 1.5 % — SIGNIFICANT CHANGE UP
METHGB MFR BLDA: 2 % — HIGH
O2 CT VFR BLDA CALC: 16.5 ML/DL — SIGNIFICANT CHANGE UP (ref 15–23)
O2 CT VFR BLDA CALC: SIGNIFICANT CHANGE UP (ref 15–23)
O2 CT VFR BLDA CALC: SIGNIFICANT CHANGE UP (ref 15–23)
OXYHGB MFR BLDA: 94 % — SIGNIFICANT CHANGE UP (ref 94–100)
OXYHGB MFR BLDA: 96 % — SIGNIFICANT CHANGE UP (ref 94–100)
OXYHGB MFR BLDA: 96 % — SIGNIFICANT CHANGE UP (ref 94–100)
PCO2 BLDA: 32 MMHG — SIGNIFICANT CHANGE UP (ref 32–45)
PCO2 BLDV: 37 MMHG — LOW (ref 41–51)
PCO2 BLDV: 39 MMHG — LOW (ref 41–51)
PH BLDA: 7.47 — HIGH (ref 7.35–7.45)
PH BLDA: 7.47 — HIGH (ref 7.35–7.45)
PH BLDA: 7.48 — HIGH (ref 7.35–7.45)
PH BLDV: 7.43 — SIGNIFICANT CHANGE UP (ref 7.32–7.43)
PH BLDV: 7.44 — HIGH (ref 7.32–7.43)
PHOSPHATE SERPL-MCNC: 2.2 MG/DL — LOW (ref 2.5–4.5)
PHOSPHATE SERPL-MCNC: 2.4 MG/DL — LOW (ref 2.5–4.5)
PHOSPHATE SERPL-MCNC: 2.6 MG/DL — SIGNIFICANT CHANGE UP (ref 2.5–4.5)
PHOSPHATE SERPL-MCNC: 3.1 MG/DL — SIGNIFICANT CHANGE UP (ref 2.5–4.5)
PLATELET # BLD AUTO: 30 K/UL — LOW (ref 150–400)
PLATELET # BLD AUTO: 36 K/UL — LOW (ref 150–400)
PLATELET # BLD AUTO: 51 K/UL — LOW (ref 150–400)
PLATELET # BLD AUTO: 59 K/UL — LOW (ref 150–400)
PO2 BLDA: 114 MMHG — HIGH (ref 83–108)
PO2 BLDA: 145 MMHG — HIGH (ref 83–108)
PO2 BLDA: 152 MMHG — HIGH (ref 83–108)
PO2 BLDV: 31 MMHG — SIGNIFICANT CHANGE UP
PO2 BLDV: 37 MMHG — SIGNIFICANT CHANGE UP
POTASSIUM SERPL-MCNC: 3.6 MMOL/L — SIGNIFICANT CHANGE UP (ref 3.5–5.3)
POTASSIUM SERPL-MCNC: 3.9 MMOL/L — SIGNIFICANT CHANGE UP (ref 3.5–5.3)
POTASSIUM SERPL-MCNC: 3.9 MMOL/L — SIGNIFICANT CHANGE UP (ref 3.5–5.3)
POTASSIUM SERPL-MCNC: 4.3 MMOL/L — SIGNIFICANT CHANGE UP (ref 3.5–5.3)
POTASSIUM SERPL-SCNC: 3.6 MMOL/L — SIGNIFICANT CHANGE UP (ref 3.5–5.3)
POTASSIUM SERPL-SCNC: 3.9 MMOL/L — SIGNIFICANT CHANGE UP (ref 3.5–5.3)
POTASSIUM SERPL-SCNC: 3.9 MMOL/L — SIGNIFICANT CHANGE UP (ref 3.5–5.3)
POTASSIUM SERPL-SCNC: 4.3 MMOL/L — SIGNIFICANT CHANGE UP (ref 3.5–5.3)
PROT SERPL-MCNC: 4 G/DL — LOW (ref 6–8.3)
PROT SERPL-MCNC: 4.2 G/DL — LOW (ref 6–8.3)
PROT SERPL-MCNC: 4.3 G/DL — LOW (ref 6–8.3)
PROT SERPL-MCNC: 4.5 G/DL — LOW (ref 6–8.3)
PROTHROM AB SERPL-ACNC: 16.2 SEC — HIGH (ref 10–12.9)
PROTHROM AB SERPL-ACNC: 17.4 SEC — HIGH (ref 10–12.9)
PROTHROM AB SERPL-ACNC: 17.9 SEC — HIGH (ref 10–12.9)
PROTHROM AB SERPL-ACNC: 18.2 SEC — HIGH (ref 10–12.9)
RBC # BLD: 3.6 M/UL — LOW (ref 3.8–5.2)
RBC # BLD: 3.68 M/UL — LOW (ref 3.8–5.2)
RBC # BLD: 3.95 M/UL — SIGNIFICANT CHANGE UP (ref 3.8–5.2)
RBC # BLD: 4 M/UL — SIGNIFICANT CHANGE UP (ref 3.8–5.2)
RBC # FLD: 17.1 % — HIGH (ref 10.3–16.9)
RBC # FLD: 17.7 % — HIGH (ref 10.3–16.9)
RBC # FLD: 17.9 % — HIGH (ref 10.3–16.9)
RBC # FLD: 18 % — HIGH (ref 10.3–16.9)
RH IG SCN BLD-IMP: POSITIVE — SIGNIFICANT CHANGE UP
SAO2 % BLDA: 98 % — SIGNIFICANT CHANGE UP (ref 95–100)
SAO2 % BLDV: 58 % — SIGNIFICANT CHANGE UP
SAO2 % BLDV: 71 % — SIGNIFICANT CHANGE UP
SAO2 % BLDV: SIGNIFICANT CHANGE UP %
SODIUM SERPL-SCNC: 135 MMOL/L — SIGNIFICANT CHANGE UP (ref 135–145)
SODIUM SERPL-SCNC: 136 MMOL/L — SIGNIFICANT CHANGE UP (ref 135–145)
VANCOMYCIN TROUGH SERPL-MCNC: 10.9 UG/ML — SIGNIFICANT CHANGE UP (ref 10–20)
WBC # BLD: 21 K/UL — HIGH (ref 3.8–10.5)
WBC # BLD: 22 K/UL — HIGH (ref 3.8–10.5)
WBC # BLD: 23.3 K/UL — HIGH (ref 3.8–10.5)
WBC # BLD: 23.5 K/UL — HIGH (ref 3.8–10.5)
WBC # FLD AUTO: 21 K/UL — HIGH (ref 3.8–10.5)
WBC # FLD AUTO: 22 K/UL — HIGH (ref 3.8–10.5)
WBC # FLD AUTO: 23.3 K/UL — HIGH (ref 3.8–10.5)
WBC # FLD AUTO: 23.5 K/UL — HIGH (ref 3.8–10.5)

## 2018-12-24 PROCEDURE — 99292 CRITICAL CARE ADDL 30 MIN: CPT

## 2018-12-24 PROCEDURE — 71045 X-RAY EXAM CHEST 1 VIEW: CPT | Mod: 26

## 2018-12-24 PROCEDURE — 93306 TTE W/DOPPLER COMPLETE: CPT | Mod: 26

## 2018-12-24 PROCEDURE — 99291 CRITICAL CARE FIRST HOUR: CPT

## 2018-12-24 PROCEDURE — 99232 SBSQ HOSP IP/OBS MODERATE 35: CPT | Mod: GC

## 2018-12-24 PROCEDURE — 99292 CRITICAL CARE ADDL 30 MIN: CPT | Mod: 24

## 2018-12-24 RX ORDER — POTASSIUM PHOSPHATE, MONOBASIC POTASSIUM PHOSPHATE, DIBASIC 236; 224 MG/ML; MG/ML
15 INJECTION, SOLUTION INTRAVENOUS ONCE
Qty: 0 | Refills: 0 | Status: COMPLETED | OUTPATIENT
Start: 2018-12-24 | End: 2018-12-24

## 2018-12-24 RX ORDER — CHLORHEXIDINE GLUCONATE 213 G/1000ML
15 SOLUTION TOPICAL
Qty: 0 | Refills: 0 | Status: DISCONTINUED | OUTPATIENT
Start: 2018-12-24 | End: 2018-12-27

## 2018-12-24 RX ORDER — ALBUMIN HUMAN 25 %
250 VIAL (ML) INTRAVENOUS ONCE
Qty: 0 | Refills: 0 | Status: COMPLETED | OUTPATIENT
Start: 2018-12-24 | End: 2018-12-24

## 2018-12-24 RX ORDER — POTASSIUM CHLORIDE 20 MEQ
10 PACKET (EA) ORAL ONCE
Qty: 0 | Refills: 0 | Status: COMPLETED | OUTPATIENT
Start: 2018-12-24 | End: 2018-12-25

## 2018-12-24 RX ORDER — POTASSIUM CHLORIDE 20 MEQ
20 PACKET (EA) ORAL ONCE
Qty: 0 | Refills: 0 | Status: COMPLETED | OUTPATIENT
Start: 2018-12-24 | End: 2018-12-24

## 2018-12-24 RX ORDER — PHENYLEPHRINE HYDROCHLORIDE 10 MG/ML
0.2 INJECTION INTRAVENOUS
Qty: 40 | Refills: 0 | Status: DISCONTINUED | OUTPATIENT
Start: 2018-12-24 | End: 2018-12-25

## 2018-12-24 RX ORDER — CALCIUM GLUCONATE 100 MG/ML
2 VIAL (ML) INTRAVENOUS ONCE
Qty: 0 | Refills: 0 | Status: COMPLETED | OUTPATIENT
Start: 2018-12-24 | End: 2018-12-24

## 2018-12-24 RX ORDER — MAGNESIUM SULFATE 500 MG/ML
2 VIAL (ML) INJECTION ONCE
Qty: 0 | Refills: 0 | Status: COMPLETED | OUTPATIENT
Start: 2018-12-24 | End: 2018-12-24

## 2018-12-24 RX ORDER — ALBUMIN HUMAN 25 %
250 VIAL (ML) INTRAVENOUS
Qty: 0 | Refills: 0 | Status: COMPLETED | OUTPATIENT
Start: 2018-12-24 | End: 2018-12-24

## 2018-12-24 RX ORDER — ALBUMIN HUMAN 25 %
250 VIAL (ML) INTRAVENOUS
Qty: 0 | Refills: 0 | Status: DISCONTINUED | OUTPATIENT
Start: 2018-12-24 | End: 2018-12-24

## 2018-12-24 RX ORDER — MILRINONE LACTATE 1 MG/ML
0.12 INJECTION, SOLUTION INTRAVENOUS
Qty: 20 | Refills: 0 | Status: DISCONTINUED | OUTPATIENT
Start: 2018-12-24 | End: 2018-12-27

## 2018-12-24 RX ADMIN — FENTANYL CITRATE 12.5 MICROGRAM(S): 50 INJECTION INTRAVENOUS at 05:43

## 2018-12-24 RX ADMIN — Medication 125 MILLILITER(S): at 11:30

## 2018-12-24 RX ADMIN — Medication 200 GRAM(S): at 00:05

## 2018-12-24 RX ADMIN — Medication 75 MILLIGRAM(S): at 05:56

## 2018-12-24 RX ADMIN — Medication 200 GRAM(S): at 06:48

## 2018-12-24 RX ADMIN — Medication 200 GRAM(S): at 17:30

## 2018-12-24 RX ADMIN — Medication 200 GRAM(S): at 13:08

## 2018-12-24 RX ADMIN — Medication 75 MILLIGRAM(S): at 14:36

## 2018-12-24 RX ADMIN — Medication 200 GRAM(S): at 11:36

## 2018-12-24 RX ADMIN — Medication 125 MILLILITER(S): at 17:18

## 2018-12-24 RX ADMIN — Medication 200 GRAM(S): at 18:32

## 2018-12-24 RX ADMIN — PIPERACILLIN AND TAZOBACTAM 200 GRAM(S): 4; .5 INJECTION, POWDER, LYOPHILIZED, FOR SOLUTION INTRAVENOUS at 05:57

## 2018-12-24 RX ADMIN — Medication 125 MILLILITER(S): at 10:00

## 2018-12-24 RX ADMIN — FENTANYL CITRATE 12.5 MICROGRAM(S): 50 INJECTION INTRAVENOUS at 02:30

## 2018-12-24 RX ADMIN — Medication 50 GRAM(S): at 01:33

## 2018-12-24 RX ADMIN — Medication 500 MILLIGRAM(S): at 05:57

## 2018-12-24 RX ADMIN — PIPERACILLIN AND TAZOBACTAM 200 GRAM(S): 4; .5 INJECTION, POWDER, LYOPHILIZED, FOR SOLUTION INTRAVENOUS at 17:30

## 2018-12-24 RX ADMIN — Medication 100 MILLIEQUIVALENT(S): at 04:10

## 2018-12-24 RX ADMIN — CHLORHEXIDINE GLUCONATE 15 MILLILITER(S): 213 SOLUTION TOPICAL at 17:30

## 2018-12-24 RX ADMIN — Medication 5.43 MICROGRAM(S)/KG/MIN: at 13:45

## 2018-12-24 RX ADMIN — PANTOPRAZOLE SODIUM 10 MG/HR: 20 TABLET, DELAYED RELEASE ORAL at 13:45

## 2018-12-24 RX ADMIN — VASOPRESSIN 0.6 UNIT(S)/MIN: 20 INJECTION INTRAVENOUS at 13:46

## 2018-12-24 RX ADMIN — PIPERACILLIN AND TAZOBACTAM 200 GRAM(S): 4; .5 INJECTION, POWDER, LYOPHILIZED, FOR SOLUTION INTRAVENOUS at 13:08

## 2018-12-24 RX ADMIN — FENTANYL CITRATE 12.5 MICROGRAM(S): 50 INJECTION INTRAVENOUS at 06:50

## 2018-12-24 RX ADMIN — Medication 75 MILLIGRAM(S): at 21:53

## 2018-12-24 RX ADMIN — ATORVASTATIN CALCIUM 80 MILLIGRAM(S): 80 TABLET, FILM COATED ORAL at 21:52

## 2018-12-24 RX ADMIN — PIPERACILLIN AND TAZOBACTAM 200 GRAM(S): 4; .5 INJECTION, POWDER, LYOPHILIZED, FOR SOLUTION INTRAVENOUS at 00:06

## 2018-12-24 RX ADMIN — POTASSIUM PHOSPHATE, MONOBASIC POTASSIUM PHOSPHATE, DIBASIC 62.5 MILLIMOLE(S): 236; 224 INJECTION, SOLUTION INTRAVENOUS at 23:35

## 2018-12-24 RX ADMIN — CHLORHEXIDINE GLUCONATE 1 APPLICATION(S): 213 SOLUTION TOPICAL at 06:11

## 2018-12-24 RX ADMIN — Medication 125 MILLILITER(S): at 16:16

## 2018-12-24 RX ADMIN — MILRINONE LACTATE 2.26 MICROGRAM(S)/KG/MIN: 1 INJECTION, SOLUTION INTRAVENOUS at 13:45

## 2018-12-24 RX ADMIN — PHENYLEPHRINE HYDROCHLORIDE 4.52 MICROGRAM(S)/KG/MIN: 10 INJECTION INTRAVENOUS at 18:52

## 2018-12-24 RX ADMIN — MILRINONE LACTATE 2.26 MICROGRAM(S)/KG/MIN: 1 INJECTION, SOLUTION INTRAVENOUS at 04:42

## 2018-12-24 RX ADMIN — Medication 250 MILLIGRAM(S): at 21:18

## 2018-12-24 RX ADMIN — POTASSIUM PHOSPHATE, MONOBASIC POTASSIUM PHOSPHATE, DIBASIC 62.5 MILLIMOLE(S): 236; 224 INJECTION, SOLUTION INTRAVENOUS at 05:16

## 2018-12-24 RX ADMIN — FENTANYL CITRATE 12.5 MICROGRAM(S): 50 INJECTION INTRAVENOUS at 01:50

## 2018-12-24 RX ADMIN — Medication 125 MILLILITER(S): at 10:32

## 2018-12-24 NOTE — ADVANCED PRACTICE NURSE CONSULT - ASSESSMENT
Patient is a 78y old  Female who presented  with NSTEMI, sever acute MR due to ruptured chordae tendinae. Transferred to [facility] with cardiogenic shock. S/P Emergent mitral valve replacement.  Pt has been on multiple doses of vasopressors and has been turned and repositioned every 2 hours but still has developed pressure injuries. Bilat heels with DTI's, both measuring approx 4x4 cm, intact blood blisters; DTI to sacrum measuring approx 0.4x4 cm. Rectal tube inserted due to continued loose stool by bedside nurse. Bruising also noted over lower ankles on dorsum aspect and right mid back x 2 sites (not over bony prominences).

## 2018-12-24 NOTE — PROGRESS NOTE ADULT - ASSESSMENT
77 yo F w/ HTN, Asthma (not on steroids), HLD, GERD, SVT (on Flecainide), MVP, Osteoporosis, known Lung nodule (RUL, for 2 years) - who presents as a transfer from OSH for NSTEMI management. Patient presented initially to OSH for hemoptysis in the context of a Bronchoscopy performed 2 weeks prior that reportedly was positive on FNA/tissue biopsy for Aspergillus. This pulmonary workup was being performed for evaluation of the known lung nodule that had been found to be enlarging. During hospitalization, patient was started on anti-fungal treatment (Amphotericin initially, then switched to Voriconazole) given c/f invasive aspergillus based on report of prior bronch, but treatments were halted 2/2 intolerance of side effects. Reports now obtained from [facility] indicating tissue confirmation of Aspergillus - engendering need for treatment of invasive aspergillus. Patient has no obvious risk factors for the development of an invasive aspergillus (no chronic steroid use, immunocompromise). Would also persist with HCAP coverage at this time as it can not be excluded that patient has a bacterial pneumonia. Patient clinically has worsened with shock liver and ATN.   -Continue Vancomycin 1g q24h End Date 12/26  -Continue Zosyn 3.375g q6h End Date 12/26  -Continue Micafungin 100mg q24h, please recall if patient has improvement in liver function as micafungin is suboptimal therapy  -ID will sign off, please re-consult if patient clinical status changes or with further questions. Plan discussed with primary team

## 2018-12-24 NOTE — PROGRESS NOTE ADULT - SUBJECTIVE AND OBJECTIVE BOX
CTICU  CRITICAL  CARE  attending     Hand off received 					   Pertinent clinical, laboratory, radiographic, hemodynamic, echocardiographic, respiratory data, microbiologic data and chart were reviewed and analyzed frequently throughout the course of the day and night  Patient seen and examined with CTS/ SH attending at bedside    Pt is a 78y , Female, pod # 4 s/p MVR;  2/2 severe MR form a torn chordae,    post op:    acute resp failure  encephalopathy prob 2/2 toxic metabolic  GI bleeding  hemodynamic instability  IABP support  methemoglobinemia  HOLA on RRT    today:    inotropes/pressors  neosynephrine added  s/p 1 unit PRBC and 1 unit platelets  IABP support  encephalopathy  RRT      Sepsis: Sepsis  Cardiogenic shock: Cardiogenic shock  HOLA (acute kidney injury): HOLA (acute kidney injury)  Hemoptysis: Hemoptysis  Pneumonia: Pneumonia      , FAMILY HISTORY:  No pertinent family history in first degree relatives  PAST MEDICAL & SURGICAL HISTORY:  Asthma, unspecified asthma severity, unspecified whether complicated, unspecified whether persistent  High cholesterol  Hypertension    Patient is a 78y old  Female who presents with a chief complaint of NSTEMI (24 Dec 2018 17:09)      14 system review was unremarkable  acute changes include acute respiratory failure  Vital signs, hemodynamic and respiratory parameters were reviewed from the bedside nursing flowsheet.  ICU Vital Signs Last 24 Hrs  T(C): 36.1 (24 Dec 2018 11:00), Max: 36.3 (23 Dec 2018 21:00)  T(F): 97 (24 Dec 2018 11:00), Max: 97.3 (23 Dec 2018 21:00)  HR: 74 (24 Dec 2018 17:00) (70 - 94)  BP: --  BP(mean): --  ABP: 100/56 (24 Dec 2018 17:00) (92/60 - 138/64)  ABP(mean): 70 (24 Dec 2018 17:00) (52 - 86)  RR: 12 (24 Dec 2018 17:00) (12 - 12)  SpO2: 90% (24 Dec 2018 17:00) (70% - 100%)    Adult Advanced Hemodynamics Last 24 Hrs  CVP(mm Hg): 13 (24 Dec 2018 17:00) (8 - 16)  CVP(cm H2O): --  CO: 3.5 (24 Dec 2018 11:00) (3.1 - 3.5)  CI: 2 (24 Dec 2018 11:00) (1.8 - 2)  PA: 34/19 (24 Dec 2018 17:00) (25/15 - 38/23)  PA(mean): 25 (24 Dec 2018 17:00) (20 - 29)  PCWP: --  SVR: 1278 (24 Dec 2018 11:00) (1278 - 1623)  SVRI: 2237 (24 Dec 2018 11:00) (0506 - 2626)  PVR: --  PVRI: --, ABG - ( 24 Dec 2018 17:20 )  pH, Arterial: 7.47  pH, Blood: x     /  pCO2: 32    /  pO2: 152   / HCO3: 23    / Base Excess: -0.5  /  SaO2: x                 Mode: AC/ CMV (Assist Control/ Continuous Mandatory Ventilation)  RR (machine): 12  TV (machine): 450  FiO2: 70  PEEP: 8  ITime: 1  MAP: 10  PIP: 22    Intake and output was reviewed and the fluid balance was calculated  Daily     Daily   I&O's Summary    23 Dec 2018 07:01  -  24 Dec 2018 07:00  --------------------------------------------------------  IN: 3209.9 mL / OUT: 6182 mL / NET: -2972.1 mL    24 Dec 2018 07:01  -  24 Dec 2018 17:30  --------------------------------------------------------  IN: 2286.1 mL / OUT: 1466 mL / NET: 820.1 mL        All lines and drain sites were assessed  Glycemic trend was reviewedCAPILLARY BLOOD GLUCOSE      POCT Blood Glucose.: 136 mg/dL (24 Dec 2018 07:24)    No acute change in mental status  Auscultation of the chest reveals equal bs  Abdomen is soft  Extremities are warm and well perfused  Wounds appear clean and unremarkable  Antibiotics are periop    labs  CBC Full  -  ( 24 Dec 2018 15:49 )  WBC Count : 21.0 K/uL  Hemoglobin : 10.2 g/dL  Hematocrit : 28.8 %  Platelet Count - Automated : 30 K/uL  Mean Cell Volume : 80.0 fL  Mean Cell Hemoglobin : 28.3 pg  Mean Cell Hemoglobin Concentration : 35.4 g/dL  Auto Neutrophil # : x  Auto Lymphocyte # : x  Auto Monocyte # : x  Auto Eosinophil # : x  Auto Basophil # : x  Auto Neutrophil % : x  Auto Lymphocyte % : x  Auto Monocyte % : x  Auto Eosinophil % : x  Auto Basophil % : x    12-24    135  |  94<L>  |  23  ----------------------------<  146<H>  3.9   |  20<L>  |  0.85    Ca    9.4      24 Dec 2018 15:49  Phos  2.6     12-24  Mg     2.1     12-24    TPro  4.0<L>  /  Alb  3.1<L>  /  TBili  25.0<H>  /  DBili  x   /  AST  4749<H>  /  ALT  639<H>  /  AlkPhos  186<H>  12-24    PT/INR - ( 24 Dec 2018 15:49 )   PT: 18.2 sec;   INR: 1.59          PTT - ( 24 Dec 2018 15:49 )  PTT:35.9 sec  The current medications were reviewed   MEDICATIONS  (STANDING):  amiodarone Infusion 1 mG/Min (33.333 mL/Hr) IV Continuous <Continuous>  ascorbic acid Syrup 500 milliGRAM(s) Oral two times a day  atorvastatin 80 milliGRAM(s) Oral at bedtime  buDESOnide 160 MICROgram(s)/formoterol 4.5 MICROgram(s) Inhaler 2 Puff(s) Inhalation two times a day  calcium gluconate IVPB 1 Gram(s) IV Intermittent every 6 hours  calcium gluconate IVPB 2 Gram(s) IV Intermittent once  chlorhexidine 0.12% Liquid 15 milliLiter(s) Oral Mucosa two times a day  chlorhexidine 2% Cloths 1 Application(s) Topical <User Schedule>  CRRT Treatment    <Continuous>  dextrose 50% Injectable 50 milliLiter(s) IV Push every 15 minutes  DOBUTamine Infusion 3 MICROgram(s)/kG/Min (5.427 mL/Hr) IV Continuous <Continuous>  EPINEPHrine    Infusion 0.02 MICROgram(s)/kG/Min (4.522 mL/Hr) IV Continuous <Continuous>  hydrocortisone sodium succinate Injectable 75 milliGRAM(s) IV Push every 8 hours  insulin Infusion 2 Unit(s)/Hr (2 mL/Hr) IV Continuous <Continuous>  micafungin IVPB 100 milliGRAM(s) IV Intermittent every 24 hours  milrinone Infusion 0.125 MICROgram(s)/kG/Min (2.261 mL/Hr) IV Continuous <Continuous>  pantoprazole Infusion 8 mG/Hr (10 mL/Hr) IV Continuous <Continuous>  phenylephrine    Infusion 0.2 MICROgram(s)/kG/Min (4.522 mL/Hr) IV Continuous <Continuous>  piperacillin/tazobactam IVPB. 3.375 Gram(s) IV Intermittent every 6 hours  PureFlow Dialysate RFP-400 (K 2 / Ca 3) 5000 milliLiter(s) (1500 mL/Hr) CRRT <Continuous>  sodium chloride 0.9%. 1000 milliLiter(s) (10 mL/Hr) IV Continuous <Continuous>  vancomycin  IVPB 1000 milliGRAM(s) IV Intermittent every 24 hours  vasopressin Infusion 0.01 Unit(s)/Min (0.6 mL/Hr) IV Continuous <Continuous>    MEDICATIONS  (PRN):  ALBUTerol/ipratropium for Nebulization 3 milliLiter(s) Nebulizer every 6 hours PRN Shortness of Breath and/or Wheezing  fentaNYL    Injectable 12.5 MICROGram(s) IV Push every 3 hours PRN Severe Pain (7 - 10)       PROBLEM LIST/ ASSESSMENT:  HEALTH ISSUES - PROBLEM Dx:  acute resp failure  encephalopathy prob 2/2 toxic metabolic  GI bleeding  hemodynamic instability  IABP support  methemoglobinemia  HOLA on RRT      Sepsis: Sepsis  Cardiogenic shock: Cardiogenic shock  HOLA (acute kidney injury): HOLA (acute kidney injury)  Hemoptysis: Hemoptysis  Pneumonia: Pneumonia      ,   Patient is a 78y old  Female who presents with a chief complaint of NSTEMI (24 Dec 2018 17:09)     s/p MVR  acute changes include acute respiratory failure    My plan includes :  close hemodynamic, ventilatory and drain monitoring and management per post op routine    Monitor for arrhythmias and monitor parameters for organ perfusion  monitor neurologic status  Head of the bed should remain elevated to 45 deg .   chest PT and IS will be encouraged  monitor adequacy of oxygenation and ventilation and attempt to wean oxygen  Nutritional goals will be met using po eventually , ensure adequate caloric intake and montior the same  Stress ulcer and VTE prophylaxis will be achieved    Glycemic control is satisfactory  Electrolytes have been repleted as necessary and wound care has been carried out. Pain control has been achieved.   agressive physical therapy and early mobility and ambulation goals will be met   The family was updated about the course and plan  CRITICAL CARE TIME SPENT in evaluation and management, reassessments, review and interpretation of labs and x-rays, ventilator and hemodynamic management, formulating a plan and coordinating care: ___90____ MIN.  Time does not include procedural time.  CTICU ATTENDING     					    James Londono MD

## 2018-12-24 NOTE — PROGRESS NOTE ADULT - SUBJECTIVE AND OBJECTIVE BOX
INFECTIOUS DISEASE CONSULT PROGRESS NOTE    INTERVAL HPI/OVERNIGHT EVENTS: Patient on CVVHD at present. Last 24 hours I/o with net negative 2.9 L fluid balance on CVVHD.     ACTIVE ANTIMICROBIALS/ANTIBIOTICS:  micafungin IVPB 100 milliGRAM(s) IV Intermittent every 24 hours  piperacillin/tazobactam IVPB. 3.375 Gram(s) IV Intermittent every 6 hours  vancomycin  IVPB 1000 milliGRAM(s) IV Intermittent every 24 hours      VITAL SIGNS:  ICU Vital Signs Last 24 Hrs  T(C): 36.1 (24 Dec 2018 11:00), Max: 36.3 (23 Dec 2018 21:00)  T(F): 97 (24 Dec 2018 11:00), Max: 97.3 (23 Dec 2018 21:00)  HR: 78 (24 Dec 2018 16:29) (70 - 94)  BP: --  BP(mean): --  ABP: 102/54 (24 Dec 2018 16:00) (92/60 - 138/64)  ABP(mean): 70 (24 Dec 2018 16:00) (52 - 86)  RR: 12 (24 Dec 2018 16:00) (12 - 12)  SpO2: 96% (24 Dec 2018 16:29) (70% - 100%)      PHYSICAL EXAM:  Constitutional: jaundiced, lying in bed intubated, ill appearing  Head: NC/AT  Eyes: Bilateral conjunctival and scleral pallor   Oral cavity: Oral mucosa dry and pale  Neck: supple  Lungs: Bilateral clear breath sounds, bibasilar minimal rhonchi, no wheezing  Cardio: RRR S1/S2, Systolic crescendo murmur II/VI at LPSB, No gallop, no rub, surgical scar present.   Abdomen: Soft, Nontender, non distended, bowel sound present, No flank tenderness.   Extremities: No clubbing, cyanosis, or edema  Neurology: sedated  Skin: No rashes or lesions    LABS:                        10.2   21.0  )-----------( 30       ( 24 Dec 2018 15:49 )             28.8       12-24    135  |  94<L>  |  23  ----------------------------<  146<H>  3.9   |  20<L>  |  0.85    Ca    9.4      24 Dec 2018 15:49  Phos  2.6     12-24  Mg     2.1     12-24    TPro  4.0<L>  /  Alb  3.1<L>  /  TBili  25.0<H>  /  DBili  x   /  AST  4749<H>  /  ALT  639<H>  /  AlkPhos  186<H>  12-24      Lactate, Blood: 3.9 mmoL/L (12-24-18 @ 15:49)  Lactate, Blood: 3.9 mmoL/L (12-24-18 @ 10:20)  Lactate, Blood: 3.1 mmoL/L (12-24-18 @ 02:47)  Lactate, Blood: 2.6 mmoL/L (12-23-18 @ 21:39)      MICROBIOLOGY:      RADIOLOGY & ADDITIONAL STUDIES:

## 2018-12-24 NOTE — ADVANCED PRACTICE NURSE CONSULT - RECOMMEDATIONS
Recommend protecting site to sacrum with foam dressing, continue Z-vee boots bilaterally. Spoke with JEROD Alegre and house staff.

## 2018-12-24 NOTE — PROGRESS NOTE ADULT - PROBLEM SELECTOR PLAN 1
- oliguric, from ATN from cardiogenic shock   - on CVVHD since 12/21 - we will continue today - blood flow 200 ml/min, dialysate flow 2000 ml/h with  ml/hour - the goal to be negative in 24 hours   - last 24 hours UF 4.9 liters off, 1 .4 liter negative  - volume status on the wet side   - electrolytes noted -   - lactic acidosis from the cardiogenic shock   - in and outs   - daily weights Anuric HOLA due to ischemic ATN from cardiogenic shock on CVVHD    Plan:  Strict I/o  Daily weight  Monitor urine output and bladder scan  Continue CVVHD with change parameter as per ordered today  Goal fluid balance to be net neutral to mild negative in next 24 hours duration  Monitor CMP, Mag, phos, Giovani, CBC every 6 hrly

## 2018-12-24 NOTE — PROGRESS NOTE ADULT - SUBJECTIVE AND OBJECTIVE BOX
Patient is a 78y Female seen and evaluated at bedside. Patient remains critically ill on ventilatory support and IV pressors as well as inotropic support. Patient on CVVHD at present. Last 24 hours I/o      albumin human  5% IVPB 250 once  albumin human  5% IVPB 250 every 10 minutes  ALBUTerol/ipratropium for Nebulization 3 every 6 hours PRN  amiodarone Infusion 1 <Continuous>  ascorbic acid Syrup 500 two times a day  atorvastatin 80 at bedtime  buDESOnide 160 MICROgram(s)/formoterol 4.5 MICROgram(s) Inhaler 2 two times a day  calcium gluconate IVPB 1 every 6 hours  chlorhexidine 2% Cloths 1 <User Schedule>  CRRT Treatment  <Continuous>  dextrose 50% Injectable 50 every 15 minutes  DOBUTamine Infusion 3 <Continuous>  EPINEPHrine    Infusion 0.02 <Continuous>  fentaNYL    Injectable 12.5 every 3 hours PRN  hydrocortisone sodium succinate Injectable 75 every 8 hours  insulin Infusion 2 <Continuous>  micafungin IVPB 100 every 24 hours  milrinone Infusion 0.125 <Continuous>  pantoprazole Infusion 8 <Continuous>  piperacillin/tazobactam IVPB. 3.375 every 6 hours  PureFlow Dialysate RFP-400 (K 2 / Ca 3) 5000 <Continuous>  sodium chloride 0.9%. 1000 <Continuous>  vancomycin  IVPB 1000 every 24 hours  vasopressin Infusion 0.01 <Continuous>      Allergies    No Known Allergies    Intolerances        T(C): , Max: 36.3 (12-23-18 @ 21:00)  T(F): , Max: 97.3 (12-23-18 @ 21:00)  HR: 86 (12-24-18 @ 10:00)  BP: --  BP(mean): --  RR: 12 (12-24-18 @ 10:00)  SpO2: 76% (12-24-18 @ 10:00)  Wt(kg): --    12-23 @ 07:01  -  12-24 @ 07:00  --------------------------------------------------------  IN: 3209.9 mL / OUT: 6182 mL / NET: -2972.1 mL    12-24 @ 07:01  -  12-24 @ 10:55  --------------------------------------------------------  IN: 590.4 mL / OUT: 531 mL / NET: 59.4 mL          Review of Systems:  CONSTITUTIONAL: No fever or chills, No fatigue or tiredness.  EYES: No blurred or double vision.  RESPIRATORY: No shortness of breath, cough, hemoptysis  CARDIOVASCULAR: No Chest pain or shortness of breath  GASTROINTESTINAL: NO abdominal or flank pain, No nausea or vomiting, No diarrhea  GENITOURINARY: No dysuria or urinary burning, No difficulty passing urine, No hematuria  NEUROLOGICAL: No headaches or blurred vision  SKIN: No skin rashes   MUSCULOSKELETAL: No arthralgia, Joint pain, leg edema, No muscle pains      PHYSICAL EXAM:  GENERAL: NAD, well-developed, well nourished, alert, awake, no acute distress at present  HEAD:  Atraumatic, Normocephalic,   EYES: Bilateral conjuctiva and sclera normal   Oral cavity: Oral mucosa dry and pink  NECK: Neck supple, No JVD  CHEST/LUNG: Clear to auscultation bilaterally; No wheeze, no rales, no crepitations  HEART: Regular rate and rhythm. HIREN II/VI at LPSB, No gallop, no rub   ABDOMEN: Soft, Nontender, BS+nt, No flank tenderness.   EXTREMITIES: No clubbing, cyanosis, or edema  Neurology: AAOx3, no focal neurological deficit  SKIN: No rashes or lesions          ACCESS:     LABS:                        11.7   23.5  )-----------( 36       ( 24 Dec 2018 10:21 )             31.8     12-24    136  |  98  |  22  ----------------------------<  112<H>  3.9   |  21<L>  |  0.90    Ca    9.1      24 Dec 2018 02:46  Phos  2.2     12-24  Mg     2.6     12-24    TPro  4.3<L>  /  Alb  2.9<L>  /  TBili  23.7<H>  /  DBili  x   /  AST  6642<H>  /  ALT  1014<H>  /  AlkPhos  172<H>  12-24      PT/INR - ( 24 Dec 2018 10:21 )   PT: 16.2 sec;   INR: 1.42          PTT - ( 24 Dec 2018 10:21 )  PTT:31.2 sec          RADIOLOGY & ADDITIONAL STUDIES: Patient is a 78y Female seen and evaluated at bedside. Patient remains critically ill on ventilatory support and IV pressors as well as inotropic support. Patient on CVVHD at present. Last 24 hours I/o with net negative 2.9 L fluid balance on CVVHD.       albumin human  5% IVPB 250 once  albumin human  5% IVPB 250 every 10 minutes  ALBUTerol/ipratropium for Nebulization 3 every 6 hours PRN  amiodarone Infusion 1 <Continuous>  ascorbic acid Syrup 500 two times a day  atorvastatin 80 at bedtime  buDESOnide 160 MICROgram(s)/formoterol 4.5 MICROgram(s) Inhaler 2 two times a day  calcium gluconate IVPB 1 every 6 hours  chlorhexidine 2% Cloths 1 <User Schedule>  CRRT Treatment  <Continuous>  dextrose 50% Injectable 50 every 15 minutes  DOBUTamine Infusion 3 <Continuous>  EPINEPHrine    Infusion 0.02 <Continuous>  fentaNYL    Injectable 12.5 every 3 hours PRN  hydrocortisone sodium succinate Injectable 75 every 8 hours  insulin Infusion 2 <Continuous>  micafungin IVPB 100 every 24 hours  milrinone Infusion 0.125 <Continuous>  pantoprazole Infusion 8 <Continuous>  piperacillin/tazobactam IVPB. 3.375 every 6 hours  PureFlow Dialysate RFP-400 (K 2 / Ca 3) 5000 <Continuous>  sodium chloride 0.9%. 1000 <Continuous>  vancomycin  IVPB 1000 every 24 hours  vasopressin Infusion 0.01 <Continuous>      Allergies    No Known Allergies    Intolerances        T(C): , Max: 36.3 (12-23-18 @ 21:00)  T(F): , Max: 97.3 (12-23-18 @ 21:00)  HR: 86 (12-24-18 @ 10:00)  BP: --  BP(mean): --  RR: 12 (12-24-18 @ 10:00)  SpO2: 76% (12-24-18 @ 10:00)  Wt(kg): --    12-23 @ 07:01  -  12-24 @ 07:00  --------------------------------------------------------  IN: 3209.9 mL / OUT: 6182 mL / NET: -2972.1 mL    12-24 @ 07:01  -  12-24 @ 10:55  --------------------------------------------------------  IN: 590.4 mL / OUT: 531 mL / NET: 59.4 mL          Review of Systems:  Limited. Sedated and intubated.      PHYSICAL EXAM:  GENERAL: Ill appearing, lying in bed sedated and intubated in no acute distress at present  HEAD:  Atraumatic, Normocephalic,   EYES: Bilateral conjuctival and scleral pallor   Oral cavity: Oral mucosa dry and pale  NECK: Neck supple, No JVD, ETT tube present  CHEST/LUNG: Bilateral clear breath sounds, bibasilar minimal rhonchi, no wheezing  HEART: Regular rate and rhythm. HIREN II/VI at LPSB, No gallop, no rub, surgical scar present.   ABDOMEN: Soft, Nontender, non distended, bowel sound present, No flank tenderness.   EXTREMITIES: No clubbing, cyanosis, or edema  Neurology: AAOx0 , sedated and intuabted  SKIN: No rashes or lesions    ACCESS: Right IJ HD catheter    LABS:                        11.7   23.5  )-----------( 36       ( 24 Dec 2018 10:21 )             31.8     12-24    136  |  98  |  22  ----------------------------<  112<H>  3.9   |  21<L>  |  0.90    Ca    9.1      24 Dec 2018 02:46  Phos  2.2     12-24  Mg     2.6     12-24    TPro  4.3<L>  /  Alb  2.9<L>  /  TBili  23.7<H>  /  DBili  x   /  AST  6642<H>  /  ALT  1014<H>  /  AlkPhos  172<H>  12-24      PT/INR - ( 24 Dec 2018 10:21 )   PT: 16.2 sec;   INR: 1.42          PTT - ( 24 Dec 2018 10:21 )  PTT:31.2 sec          RADIOLOGY & ADDITIONAL STUDIES:  < from: Xray Chest 1 View-PORTABLE IMMEDIATE (12.23.18 @ 19:01) >    ******PRELIMINARY REPORT******    ******PRELIMINARY REPORT******            EXAM:  XR CHEST PORTABLE IMMED 1V                          PROCEDURE DATE:  12/23/2018    ******PRELIMINARY REPORT******    ******PRELIMINARY REPORT******              INTERPRETATION:  XR CHEST PORTABLE IMMED 1V dated 12/23/2018 7:01 PM.    INDICATION: Contralateral, rule out pneumothorax.    COMPARISON: Chest x-ray from same day at 4:09 PM    FINDINGS:  Single AP view of the chest is submitted. Stable support lines and  catheters including endotracheal tube, enteric tube, right and left   central venous catheters, left chest tube, right and middle chest   transplant. Interval removal of left chest drain. The cardiomediastinal   silhouette is stable. Stable small bilateral pleural effusions and   right-sided infiltrates. No significant pneumothorax. Median sternotomy   wires.      IMPRESSION:  Interval removal of left chest drain. No significant pneumothorax.              "Thank you for the opportunity to participate in the care of this   patient."  ******PRELIMINARY REPORT******    ******PRELIMINARY REPORT******          ADIS BACA M.D., RADIOLOGY RESIDENT                        < end of copied text >

## 2018-12-24 NOTE — PROGRESS NOTE ADULT - ASSESSMENT
78 now in shock s/p cardiac surgery - oliguric HOLA from ATn secondary to shock, still requring pressors and inotrope. ON CVVHD -4.9 liters removed, . We will continue with CVVHD today - neagtive 1.4 liters. 78 now in shock s/p cardiac surgery - oliguric HOLA from ATn secondary to shock, still requring pressors and inotrope. Patient on  CVVHD today - negative 2.9 liters.

## 2018-12-24 NOTE — CHART NOTE - NSCHARTNOTEFT_GEN_A_CORE
As per Dr. Londono, mediastinal tube removed at bedside after clamp trial. . U stitch tied down in place and occlusive dressing applied. Patient tolerated procedure well. Follow up CXR stable with no obvious ptx

## 2018-12-25 LAB
ALBUMIN SERPL ELPH-MCNC: 2.8 G/DL — LOW (ref 3.3–5)
ALBUMIN SERPL ELPH-MCNC: 2.8 G/DL — LOW (ref 3.3–5)
ALBUMIN SERPL ELPH-MCNC: 2.9 G/DL — LOW (ref 3.3–5)
ALBUMIN SERPL ELPH-MCNC: 3.2 G/DL — LOW (ref 3.3–5)
ALP SERPL-CCNC: 213 U/L — HIGH (ref 40–120)
ALP SERPL-CCNC: 222 U/L — HIGH (ref 40–120)
ALP SERPL-CCNC: 225 U/L — HIGH (ref 40–120)
ALP SERPL-CCNC: 235 U/L — HIGH (ref 40–120)
ALT FLD-CCNC: 331 U/L — HIGH (ref 10–45)
ALT FLD-CCNC: 392 U/L — HIGH (ref 10–45)
ALT FLD-CCNC: 474 U/L — HIGH (ref 10–45)
ALT FLD-CCNC: 528 U/L — HIGH (ref 10–45)
ANION GAP SERPL CALC-SCNC: 13 MMOL/L — SIGNIFICANT CHANGE UP (ref 5–17)
ANION GAP SERPL CALC-SCNC: 18 MMOL/L — HIGH (ref 5–17)
ANION GAP SERPL CALC-SCNC: 19 MMOL/L — HIGH (ref 5–17)
ANION GAP SERPL CALC-SCNC: 20 MMOL/L — HIGH (ref 5–17)
APTT BLD: 33.7 SEC — SIGNIFICANT CHANGE UP (ref 27.5–36.3)
APTT BLD: 33.8 SEC — SIGNIFICANT CHANGE UP (ref 27.5–36.3)
APTT BLD: 35.5 SEC — SIGNIFICANT CHANGE UP (ref 27.5–36.3)
APTT BLD: 37.1 SEC — HIGH (ref 27.5–36.3)
AST SERPL-CCNC: 1605 U/L — HIGH (ref 10–40)
AST SERPL-CCNC: 2293 U/L — HIGH (ref 10–40)
AST SERPL-CCNC: 2992 U/L — HIGH (ref 10–40)
AST SERPL-CCNC: 3843 U/L — HIGH (ref 10–40)
BASE EXCESS BLDA CALC-SCNC: -0.4 MMOL/L — SIGNIFICANT CHANGE UP (ref -2–3)
BASE EXCESS BLDA CALC-SCNC: -2 MMOL/L — SIGNIFICANT CHANGE UP (ref -2–3)
BASE EXCESS BLDA CALC-SCNC: 0.7 MMOL/L — SIGNIFICANT CHANGE UP (ref -2–3)
BASE EXCESS BLDV CALC-SCNC: 1.5 MMOL/L — SIGNIFICANT CHANGE UP
BILIRUB DIRECT SERPL-MCNC: >16.2 MG/DL — HIGH (ref 0–0.2)
BILIRUB INDIRECT FLD-MCNC: <8.9 MG/DL — HIGH (ref 0.2–1)
BILIRUB SERPL-MCNC: 23 MG/DL — HIGH (ref 0.2–1.2)
BILIRUB SERPL-MCNC: 23.4 MG/DL — HIGH (ref 0.2–1.2)
BILIRUB SERPL-MCNC: 25.1 MG/DL — HIGH (ref 0.2–1.2)
BILIRUB SERPL-MCNC: 27 MG/DL — HIGH (ref 0.2–1.2)
BUN SERPL-MCNC: 23 MG/DL — SIGNIFICANT CHANGE UP (ref 7–23)
BUN SERPL-MCNC: 26 MG/DL — HIGH (ref 7–23)
CALCIUM SERPL-MCNC: 9.1 MG/DL — SIGNIFICANT CHANGE UP (ref 8.4–10.5)
CALCIUM SERPL-MCNC: 9.1 MG/DL — SIGNIFICANT CHANGE UP (ref 8.4–10.5)
CALCIUM SERPL-MCNC: 9.2 MG/DL — SIGNIFICANT CHANGE UP (ref 8.4–10.5)
CALCIUM SERPL-MCNC: 9.5 MG/DL — SIGNIFICANT CHANGE UP (ref 8.4–10.5)
CHLORIDE SERPL-SCNC: 103 MMOL/L — SIGNIFICANT CHANGE UP (ref 96–108)
CHLORIDE SERPL-SCNC: 94 MMOL/L — LOW (ref 96–108)
CHLORIDE SERPL-SCNC: 96 MMOL/L — SIGNIFICANT CHANGE UP (ref 96–108)
CHLORIDE SERPL-SCNC: 96 MMOL/L — SIGNIFICANT CHANGE UP (ref 96–108)
CK SERPL-CCNC: CRITICAL HIGH U/L (ref 25–170)
CO2 SERPL-SCNC: 20 MMOL/L — LOW (ref 22–31)
CO2 SERPL-SCNC: 21 MMOL/L — LOW (ref 22–31)
CO2 SERPL-SCNC: 21 MMOL/L — LOW (ref 22–31)
CO2 SERPL-SCNC: 22 MMOL/L — SIGNIFICANT CHANGE UP (ref 22–31)
COHGB MFR BLDA: 0.3 % — SIGNIFICANT CHANGE UP
COHGB MFR BLDA: 0.6 % — SIGNIFICANT CHANGE UP
COHGB MFR BLDA: 0.7 % — SIGNIFICANT CHANGE UP
CREAT SERPL-MCNC: 0.82 MG/DL — SIGNIFICANT CHANGE UP (ref 0.5–1.3)
CREAT SERPL-MCNC: 0.9 MG/DL — SIGNIFICANT CHANGE UP (ref 0.5–1.3)
CREAT SERPL-MCNC: 0.9 MG/DL — SIGNIFICANT CHANGE UP (ref 0.5–1.3)
CREAT SERPL-MCNC: 0.92 MG/DL — SIGNIFICANT CHANGE UP (ref 0.5–1.3)
GAS PNL BLDA: SIGNIFICANT CHANGE UP
GAS PNL BLDV: SIGNIFICANT CHANGE UP
GAS PNL BLDV: SIGNIFICANT CHANGE UP
GLUCOSE BLDC GLUCOMTR-MCNC: 133 MG/DL — HIGH (ref 70–99)
GLUCOSE SERPL-MCNC: 120 MG/DL — HIGH (ref 70–99)
GLUCOSE SERPL-MCNC: 128 MG/DL — HIGH (ref 70–99)
GLUCOSE SERPL-MCNC: 141 MG/DL — HIGH (ref 70–99)
GLUCOSE SERPL-MCNC: 141 MG/DL — HIGH (ref 70–99)
HCO3 BLDA-SCNC: 21 MMOL/L — SIGNIFICANT CHANGE UP (ref 21–28)
HCO3 BLDA-SCNC: 23 MMOL/L — SIGNIFICANT CHANGE UP (ref 21–28)
HCO3 BLDA-SCNC: 24 MMOL/L — SIGNIFICANT CHANGE UP (ref 21–28)
HCO3 BLDV-SCNC: 24 MMOL/L — SIGNIFICANT CHANGE UP (ref 20–27)
HCO3 BLDV-SCNC: 26 MMOL/L — SIGNIFICANT CHANGE UP (ref 20–27)
HCT VFR BLD CALC: 29.8 % — LOW (ref 34.5–45)
HCT VFR BLD CALC: 29.9 % — LOW (ref 34.5–45)
HCT VFR BLD CALC: 30.3 % — LOW (ref 34.5–45)
HCT VFR BLD CALC: 30.3 % — LOW (ref 34.5–45)
HGB BLD-MCNC: 10.6 G/DL — LOW (ref 11.5–15.5)
HGB BLD-MCNC: 10.6 G/DL — LOW (ref 11.5–15.5)
HGB BLD-MCNC: 10.8 G/DL — LOW (ref 11.5–15.5)
HGB BLD-MCNC: 10.9 G/DL — LOW (ref 11.5–15.5)
HGB BLDA-MCNC: 10.5 G/DL — LOW (ref 11.5–15.5)
HGB BLDA-MCNC: 10.8 G/DL — LOW (ref 11.5–15.5)
HGB BLDA-MCNC: 10.8 G/DL — LOW (ref 11.5–15.5)
INR BLD: 1.57 — HIGH (ref 0.88–1.16)
INR BLD: 1.6 — HIGH (ref 0.88–1.16)
INR BLD: 1.7 — HIGH (ref 0.88–1.16)
INR BLD: 1.82 — HIGH (ref 0.88–1.16)
LACTATE SERPL-SCNC: 2.6 MMOL/L — HIGH (ref 0.5–2)
LACTATE SERPL-SCNC: 3.1 MMOL/L — HIGH (ref 0.5–2)
LACTATE SERPL-SCNC: 3.6 MMOL/L — HIGH (ref 0.5–2)
LACTATE SERPL-SCNC: 3.8 MMOL/L — HIGH (ref 0.5–2)
MAGNESIUM SERPL-MCNC: 1.8 MG/DL — SIGNIFICANT CHANGE UP (ref 1.6–2.6)
MAGNESIUM SERPL-MCNC: 1.9 MG/DL — SIGNIFICANT CHANGE UP (ref 1.6–2.6)
MAGNESIUM SERPL-MCNC: 2.4 MG/DL — SIGNIFICANT CHANGE UP (ref 1.6–2.6)
MCHC RBC-ENTMCNC: 29 PG — SIGNIFICANT CHANGE UP (ref 27–34)
MCHC RBC-ENTMCNC: 29.3 PG — SIGNIFICANT CHANGE UP (ref 27–34)
MCHC RBC-ENTMCNC: 35.5 G/DL — SIGNIFICANT CHANGE UP (ref 32–36)
MCHC RBC-ENTMCNC: 35.6 G/DL — SIGNIFICANT CHANGE UP (ref 32–36)
MCHC RBC-ENTMCNC: 35.6 G/DL — SIGNIFICANT CHANGE UP (ref 32–36)
MCHC RBC-ENTMCNC: 36 G/DL — SIGNIFICANT CHANGE UP (ref 32–36)
MCV RBC AUTO: 81.2 FL — SIGNIFICANT CHANGE UP (ref 80–100)
MCV RBC AUTO: 81.5 FL — SIGNIFICANT CHANGE UP (ref 80–100)
MCV RBC AUTO: 81.6 FL — SIGNIFICANT CHANGE UP (ref 80–100)
MCV RBC AUTO: 81.9 FL — SIGNIFICANT CHANGE UP (ref 80–100)
METHGB MFR BLDA: 0.8 % — SIGNIFICANT CHANGE UP
METHGB MFR BLDA: 1.2 % — SIGNIFICANT CHANGE UP
METHGB MFR BLDA: 1.3 % — SIGNIFICANT CHANGE UP
O2 CT VFR BLDA CALC: SIGNIFICANT CHANGE UP (ref 15–23)
OXYHGB MFR BLDA: 96 % — SIGNIFICANT CHANGE UP (ref 94–100)
OXYHGB MFR BLDA: 97 % — SIGNIFICANT CHANGE UP (ref 94–100)
OXYHGB MFR BLDA: 97 % — SIGNIFICANT CHANGE UP (ref 94–100)
PCO2 BLDA: 31 MMHG — LOW (ref 32–45)
PCO2 BLDA: 33 MMHG — SIGNIFICANT CHANGE UP (ref 32–45)
PCO2 BLDA: 35 MMHG — SIGNIFICANT CHANGE UP (ref 32–45)
PCO2 BLDV: 37 MMHG — LOW (ref 41–51)
PCO2 BLDV: 39 MMHG — LOW (ref 41–51)
PH BLDA: 7.46 — HIGH (ref 7.35–7.45)
PH BLDA: 7.46 — HIGH (ref 7.35–7.45)
PH BLDA: 7.47 — HIGH (ref 7.35–7.45)
PH BLDV: 7.43 — SIGNIFICANT CHANGE UP (ref 7.32–7.43)
PH BLDV: 7.43 — SIGNIFICANT CHANGE UP (ref 7.32–7.43)
PHOSPHATE SERPL-MCNC: 2.2 MG/DL — LOW (ref 2.5–4.5)
PHOSPHATE SERPL-MCNC: 2.8 MG/DL — SIGNIFICANT CHANGE UP (ref 2.5–4.5)
PHOSPHATE SERPL-MCNC: 3.2 MG/DL — SIGNIFICANT CHANGE UP (ref 2.5–4.5)
PLATELET # BLD AUTO: 100 K/UL — LOW (ref 150–400)
PLATELET # BLD AUTO: 139 K/UL — LOW (ref 150–400)
PLATELET # BLD AUTO: 36 K/UL — LOW (ref 150–400)
PLATELET # BLD AUTO: 57 K/UL — LOW (ref 150–400)
PO2 BLDA: 120 MMHG — HIGH (ref 83–108)
PO2 BLDA: 126 MMHG — HIGH (ref 83–108)
PO2 BLDA: 155 MMHG — HIGH (ref 83–108)
PO2 BLDV: 38 MMHG — SIGNIFICANT CHANGE UP
PO2 BLDV: 38 MMHG — SIGNIFICANT CHANGE UP
POTASSIUM SERPL-MCNC: 3.7 MMOL/L — SIGNIFICANT CHANGE UP (ref 3.5–5.3)
POTASSIUM SERPL-MCNC: 3.8 MMOL/L — SIGNIFICANT CHANGE UP (ref 3.5–5.3)
POTASSIUM SERPL-MCNC: 3.8 MMOL/L — SIGNIFICANT CHANGE UP (ref 3.5–5.3)
POTASSIUM SERPL-MCNC: 4 MMOL/L — SIGNIFICANT CHANGE UP (ref 3.5–5.3)
POTASSIUM SERPL-SCNC: 3.7 MMOL/L — SIGNIFICANT CHANGE UP (ref 3.5–5.3)
POTASSIUM SERPL-SCNC: 3.8 MMOL/L — SIGNIFICANT CHANGE UP (ref 3.5–5.3)
POTASSIUM SERPL-SCNC: 3.8 MMOL/L — SIGNIFICANT CHANGE UP (ref 3.5–5.3)
POTASSIUM SERPL-SCNC: 4 MMOL/L — SIGNIFICANT CHANGE UP (ref 3.5–5.3)
PROT SERPL-MCNC: 3.8 G/DL — LOW (ref 6–8.3)
PROT SERPL-MCNC: 4 G/DL — LOW (ref 6–8.3)
PROT SERPL-MCNC: 4 G/DL — LOW (ref 6–8.3)
PROT SERPL-MCNC: 4.1 G/DL — LOW (ref 6–8.3)
PROTHROM AB SERPL-ACNC: 18 SEC — HIGH (ref 10–12.9)
PROTHROM AB SERPL-ACNC: 18.3 SEC — HIGH (ref 10–12.9)
PROTHROM AB SERPL-ACNC: 19.5 SEC — HIGH (ref 10–12.9)
PROTHROM AB SERPL-ACNC: 20.9 SEC — HIGH (ref 10–12.9)
RBC # BLD: 3.65 M/UL — LOW (ref 3.8–5.2)
RBC # BLD: 3.65 M/UL — LOW (ref 3.8–5.2)
RBC # BLD: 3.72 M/UL — LOW (ref 3.8–5.2)
RBC # BLD: 3.73 M/UL — LOW (ref 3.8–5.2)
RBC # FLD: 17.4 % — HIGH (ref 10.3–16.9)
RBC # FLD: 17.5 % — HIGH (ref 10.3–16.9)
SAO2 % BLDA: 98 % — SIGNIFICANT CHANGE UP (ref 95–100)
SAO2 % BLDV: 71 % — SIGNIFICANT CHANGE UP
SAO2 % BLDV: 72 % — SIGNIFICANT CHANGE UP
SODIUM SERPL-SCNC: 135 MMOL/L — SIGNIFICANT CHANGE UP (ref 135–145)
SODIUM SERPL-SCNC: 138 MMOL/L — SIGNIFICANT CHANGE UP (ref 135–145)
WBC # BLD: 21.2 K/UL — HIGH (ref 3.8–10.5)
WBC # BLD: 21.5 K/UL — HIGH (ref 3.8–10.5)
WBC # BLD: 23.6 K/UL — HIGH (ref 3.8–10.5)
WBC # BLD: 24.4 K/UL — HIGH (ref 3.8–10.5)
WBC # FLD AUTO: 21.2 K/UL — HIGH (ref 3.8–10.5)
WBC # FLD AUTO: 21.5 K/UL — HIGH (ref 3.8–10.5)
WBC # FLD AUTO: 23.6 K/UL — HIGH (ref 3.8–10.5)
WBC # FLD AUTO: 24.4 K/UL — HIGH (ref 3.8–10.5)

## 2018-12-25 PROCEDURE — 71045 X-RAY EXAM CHEST 1 VIEW: CPT | Mod: 26,77,76

## 2018-12-25 PROCEDURE — 33968 REMOVE AORTIC ASSIST DEVICE: CPT

## 2018-12-25 PROCEDURE — 99292 CRITICAL CARE ADDL 30 MIN: CPT

## 2018-12-25 PROCEDURE — 99291 CRITICAL CARE FIRST HOUR: CPT

## 2018-12-25 PROCEDURE — 71045 X-RAY EXAM CHEST 1 VIEW: CPT | Mod: 26

## 2018-12-25 RX ORDER — MAGNESIUM OXIDE 400 MG ORAL TABLET 241.3 MG
800 TABLET ORAL ONCE
Qty: 0 | Refills: 0 | Status: COMPLETED | OUTPATIENT
Start: 2018-12-25 | End: 2018-12-25

## 2018-12-25 RX ORDER — POTASSIUM CHLORIDE 20 MEQ
20 PACKET (EA) ORAL ONCE
Qty: 0 | Refills: 0 | Status: COMPLETED | OUTPATIENT
Start: 2018-12-25 | End: 2018-12-25

## 2018-12-25 RX ORDER — CISATRACURIUM BESYLATE 2 MG/ML
10 INJECTION INTRAVENOUS ONCE
Qty: 0 | Refills: 0 | Status: COMPLETED | OUTPATIENT
Start: 2018-12-25 | End: 2018-12-25

## 2018-12-25 RX ORDER — PHENYLEPHRINE HYDROCHLORIDE 10 MG/ML
2 INJECTION INTRAVENOUS
Qty: 160 | Refills: 0 | Status: DISCONTINUED | OUTPATIENT
Start: 2018-12-25 | End: 2018-12-27

## 2018-12-25 RX ORDER — POTASSIUM PHOSPHATE, MONOBASIC POTASSIUM PHOSPHATE, DIBASIC 236; 224 MG/ML; MG/ML
30 INJECTION, SOLUTION INTRAVENOUS ONCE
Qty: 0 | Refills: 0 | Status: COMPLETED | OUTPATIENT
Start: 2018-12-25 | End: 2018-12-25

## 2018-12-25 RX ORDER — ASPIRIN/CALCIUM CARB/MAGNESIUM 324 MG
81 TABLET ORAL DAILY
Qty: 0 | Refills: 0 | Status: DISCONTINUED | OUTPATIENT
Start: 2018-12-25 | End: 2018-12-27

## 2018-12-25 RX ORDER — PROPOFOL 10 MG/ML
5 INJECTION, EMULSION INTRAVENOUS
Qty: 1000 | Refills: 0 | Status: DISCONTINUED | OUTPATIENT
Start: 2018-12-25 | End: 2018-12-27

## 2018-12-25 RX ORDER — MAGNESIUM SULFATE 500 MG/ML
2 VIAL (ML) INJECTION ONCE
Qty: 0 | Refills: 0 | Status: COMPLETED | OUTPATIENT
Start: 2018-12-25 | End: 2018-12-25

## 2018-12-25 RX ORDER — MIDAZOLAM HYDROCHLORIDE 1 MG/ML
2 INJECTION, SOLUTION INTRAMUSCULAR; INTRAVENOUS ONCE
Qty: 0 | Refills: 0 | Status: DISCONTINUED | OUTPATIENT
Start: 2018-12-25 | End: 2018-12-25

## 2018-12-25 RX ADMIN — Medication 5.43 MICROGRAM(S)/KG/MIN: at 17:09

## 2018-12-25 RX ADMIN — MICAFUNGIN SODIUM 105 MILLIGRAM(S): 100 INJECTION, POWDER, LYOPHILIZED, FOR SOLUTION INTRAVENOUS at 22:35

## 2018-12-25 RX ADMIN — CHLORHEXIDINE GLUCONATE 15 MILLILITER(S): 213 SOLUTION TOPICAL at 05:33

## 2018-12-25 RX ADMIN — Medication 200 GRAM(S): at 11:33

## 2018-12-25 RX ADMIN — POTASSIUM PHOSPHATE, MONOBASIC POTASSIUM PHOSPHATE, DIBASIC 85 MILLIMOLE(S): 236; 224 INJECTION, SOLUTION INTRAVENOUS at 17:42

## 2018-12-25 RX ADMIN — Medication 100 MILLIEQUIVALENT(S): at 17:09

## 2018-12-25 RX ADMIN — Medication 500 MILLIGRAM(S): at 05:33

## 2018-12-25 RX ADMIN — PHENYLEPHRINE HYDROCHLORIDE 22.61 MICROGRAM(S)/KG/MIN: 10 INJECTION INTRAVENOUS at 22:35

## 2018-12-25 RX ADMIN — CHLORHEXIDINE GLUCONATE 15 MILLILITER(S): 213 SOLUTION TOPICAL at 17:42

## 2018-12-25 RX ADMIN — Medication 50 GRAM(S): at 19:00

## 2018-12-25 RX ADMIN — PIPERACILLIN AND TAZOBACTAM 200 GRAM(S): 4; .5 INJECTION, POWDER, LYOPHILIZED, FOR SOLUTION INTRAVENOUS at 06:00

## 2018-12-25 RX ADMIN — Medication 200 GRAM(S): at 17:09

## 2018-12-25 RX ADMIN — Medication 250 MILLIGRAM(S): at 20:00

## 2018-12-25 RX ADMIN — Medication 200 GRAM(S): at 05:30

## 2018-12-25 RX ADMIN — MICAFUNGIN SODIUM 105 MILLIGRAM(S): 100 INJECTION, POWDER, LYOPHILIZED, FOR SOLUTION INTRAVENOUS at 00:00

## 2018-12-25 RX ADMIN — PHENYLEPHRINE HYDROCHLORIDE 4.52 MICROGRAM(S)/KG/MIN: 10 INJECTION INTRAVENOUS at 17:10

## 2018-12-25 RX ADMIN — PIPERACILLIN AND TAZOBACTAM 200 GRAM(S): 4; .5 INJECTION, POWDER, LYOPHILIZED, FOR SOLUTION INTRAVENOUS at 11:33

## 2018-12-25 RX ADMIN — MILRINONE LACTATE 2.26 MICROGRAM(S)/KG/MIN: 1 INJECTION, SOLUTION INTRAVENOUS at 17:09

## 2018-12-25 RX ADMIN — Medication 75 MILLIGRAM(S): at 17:10

## 2018-12-25 RX ADMIN — CHLORHEXIDINE GLUCONATE 1 APPLICATION(S): 213 SOLUTION TOPICAL at 05:33

## 2018-12-25 RX ADMIN — Medication 100 MILLIEQUIVALENT(S): at 11:33

## 2018-12-25 RX ADMIN — MIDAZOLAM HYDROCHLORIDE 2 MILLIGRAM(S): 1 INJECTION, SOLUTION INTRAMUSCULAR; INTRAVENOUS at 14:20

## 2018-12-25 RX ADMIN — PHENYLEPHRINE HYDROCHLORIDE 4.52 MICROGRAM(S)/KG/MIN: 10 INJECTION INTRAVENOUS at 10:50

## 2018-12-25 RX ADMIN — Medication 500 MILLIGRAM(S): at 17:11

## 2018-12-25 RX ADMIN — Medication 100 MILLIEQUIVALENT(S): at 05:15

## 2018-12-25 RX ADMIN — PANTOPRAZOLE SODIUM 10 MG/HR: 20 TABLET, DELAYED RELEASE ORAL at 10:50

## 2018-12-25 RX ADMIN — Medication 75 MILLIGRAM(S): at 06:00

## 2018-12-25 RX ADMIN — PIPERACILLIN AND TAZOBACTAM 200 GRAM(S): 4; .5 INJECTION, POWDER, LYOPHILIZED, FOR SOLUTION INTRAVENOUS at 00:30

## 2018-12-25 RX ADMIN — Medication 81 MILLIGRAM(S): at 14:16

## 2018-12-25 RX ADMIN — Medication 100 MILLIEQUIVALENT(S): at 00:40

## 2018-12-25 RX ADMIN — CISATRACURIUM BESYLATE 10 MILLIGRAM(S): 2 INJECTION INTRAVENOUS at 14:23

## 2018-12-25 RX ADMIN — PROPOFOL 1.81 MICROGRAM(S)/KG/MIN: 10 INJECTION, EMULSION INTRAVENOUS at 20:26

## 2018-12-25 RX ADMIN — PIPERACILLIN AND TAZOBACTAM 200 GRAM(S): 4; .5 INJECTION, POWDER, LYOPHILIZED, FOR SOLUTION INTRAVENOUS at 17:09

## 2018-12-25 RX ADMIN — Medication 75 MILLIGRAM(S): at 22:24

## 2018-12-25 RX ADMIN — Medication 200 GRAM(S): at 00:00

## 2018-12-25 NOTE — CHART NOTE - NSCHARTNOTEFT_GEN_A_CORE
As per Dr. Silva IABP removed at bedside this afternoon. Prior to removal IABP on 1:3 and placed on standby with no change in hemodynamics. IABP removed without any complications after 2u platelets transfused for Plt count of 35. Manual pressure held for 45 minutes, as per Dr. Silva fem stop placed for continued pressure to achieve maximal hemostasis. Continue to check right groin q10 minutes for hemostasis and pulse checks x 2 hours.

## 2018-12-25 NOTE — PROGRESS NOTE ADULT - ASSESSMENT
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  Pt with persistent shock state despite maximal support    plan   Neuro -- concern for CVA will CT head when stable for transport  CVS - IABP weaned and d/arianna today  continues to requiring Inotropic and pressor support  Echo yesterday with poor windows   remains in chronic rate controlled afib   Pulm - vent support   GI - GI proph   - monitor UOP  Endo - glycemic control  Heme - s/p plt transfusion for thrombocytopenia prior ro removal of IABP   ID - on empiric zosyn and Micafung for h/o aspergillosis   Prognosis is poor.   Health care proxy updated.     Critical post op.    Critical care time spent 80 min

## 2018-12-25 NOTE — PROGRESS NOTE ADULT - SUBJECTIVE AND OBJECTIVE BOX
Patient is a 78y Female seen and evaluated at bedside. Patient remains critically ill on ventilatory support and IV pressors as well as inotropic support. Patient on CVVHD at present.    Last 24 hours I/o with net positive  2.2 L fluid balance on CVVHD.   Patient remains anuric.  Plan is to continue CVVHD for now.      Meds:    ALBUTerol/ipratropium for Nebulization 3 milliLiter(s) Nebulizer every 6 hours PRN  ascorbic acid Syrup 500 milliGRAM(s) Oral two times a day  aspirin  chewable 81 milliGRAM(s) Oral daily  calcium gluconate IVPB 1 Gram(s) IV Intermittent every 6 hours  chlorhexidine 0.12% Liquid 15 milliLiter(s) Oral Mucosa two times a day  chlorhexidine 2% Cloths 1 Application(s) Topical <User Schedule>  CRRT Treatment    <Continuous>  dextrose 50% Injectable 50 milliLiter(s) IV Push every 15 minutes  DOBUTamine Infusion 3 MICROgram(s)/kG/Min IV Continuous <Continuous>  hydrocortisone sodium succinate Injectable 75 milliGRAM(s) IV Push every 8 hours  insulin Infusion 2 Unit(s)/Hr IV Continuous <Continuous>  micafungin IVPB 100 milliGRAM(s) IV Intermittent every 24 hours  milrinone Infusion 0.125 MICROgram(s)/kG/Min IV Continuous <Continuous>  pantoprazole Infusion 8 mG/Hr IV Continuous <Continuous>  phenylephrine    Infusion 0.2 MICROgram(s)/kG/Min IV Continuous <Continuous>  piperacillin/tazobactam IVPB. 3.375 Gram(s) IV Intermittent every 6 hours  PureFlow Dialysate RFP-400 (K 2 / Ca 3) 5000 milliLiter(s) CRRT <Continuous>  sodium chloride 0.9%. 1000 milliLiter(s) IV Continuous <Continuous>  vancomycin  IVPB 1000 milliGRAM(s) IV Intermittent every 24 hours  vasopressin Infusion 0.01 Unit(s)/Min IV Continuous <Continuous>      T(C): 36.4 (12-25-18 @ 10:00), Max: 36.4 (12-25-18 @ 10:00)  HR: 94 (12-25-18 @ 11:00) (74 - 94)  BP: --  RR: 15 (12-25-18 @ 11:00) (12 - 24)  SpO2: 95% (12-25-18 @ 11:00) (88% - 98%)    Input/Output      12-24-18 @ 07:01  -  12-25-18 @ 07:00  --------------------------------------------------------  IN: 4991.2 mL / OUT: 2715 mL / NET: 2276.2 mL    12-25-18 @ 07:01  -  12-25-18 @ 11:58  --------------------------------------------------------  IN: 581.6 mL / OUT: 192 mL / NET: 389.6 mL          Review of Systems:  Limited. Sedated and intubated.      PHYSICAL EXAM:  GENERAL: Ill appearing, lying in bed sedated and intubated  NECK: Neck supple, No JVD, ETT tube present  CHEST/LUNG: Bilateral clear breath sounds, bibasilar minimal rhonchi, no wheezing  HEART: Regular rate and rhythm. HIREN II/VI at LPSB, No gallop, no rub, surgical scar present.   ABDOMEN: Soft, Nontender, non distended, bowel sound present, No flank tenderness.   EXTREMITIES: No clubbing, cyanosis, or edema  Neurology: AAOx0 , sedated and intuabted  SKIN: No rashes or lesions    ACCESS: Right IJ HD catheter    LABS:                            10.8   21.5  )-----------( 36       ( 25 Dec 2018 10:35 )             30.3       12-25    138  |  103  |  26<H>  ----------------------------<  128<H>  3.7   |  22  |  0.90    Ca    9.1      25 Dec 2018 10:35  Phos  2.8     12-25  Mg     1.9     12-25    TPro  3.8<L>  /  Alb  2.8<L>  /  TBili  25.1<H>  /  DBili  >10.0<H>  /  AST  2992<H>  /  ALT  474<H>  /  AlkPhos  225<H>  12-25      PT/INR - ( 25 Dec 2018 10:35 )   PT: 20.9 sec;   INR: 1.82          PTT - ( 25 Dec 2018 10:35 )  PTT:37.1 sec                                  11.7   23.5  )-----------( 36       ( 24 Dec 2018 10:21 )             31.8     12-24    136  |  98  |  22  ----------------------------<  112<H>  3.9   |  21<L>  |  0.90    Ca    9.1      24 Dec 2018 02:46  Phos  2.2     12-24  Mg     2.6     12-24    TPro  4.3<L>  /  Alb  2.9<L>  /  TBili  23.7<H>  /  DBili  x   /  AST  6642<H>  /  ALT  1014<H>  /  AlkPhos  172<H>  12-24      PT/INR - ( 24 Dec 2018 10:21 )   PT: 16.2 sec;   INR: 1.42          PTT - ( 24 Dec 2018 10:21 )  PTT:31.2 sec          RADIOLOGY & ADDITIONAL STUDIES:  < from: Xray Chest 1 View-PORTABLE IMMEDIATE (12.23.18 @ 19:01) >    ******PRELIMINARY REPORT******    ******PRELIMINARY REPORT******            EXAM:  XR CHEST PORTABLE IMMED 1V                          PROCEDURE DATE:  12/23/2018    ******PRELIMINARY REPORT******    ******PRELIMINARY REPORT******              INTERPRETATION:  XR CHEST PORTABLE IMMED 1V dated 12/23/2018 7:01 PM.    INDICATION: Contralateral, rule out pneumothorax.    COMPARISON: Chest x-ray from same day at 4:09 PM    FINDINGS:  Single AP view of the chest is submitted. Stable support lines and  catheters including endotracheal tube, enteric tube, right and left   central venous catheters, left chest tube, right and middle chest   transplant. Interval removal of left chest drain. The cardiomediastinal   silhouette is stable. Stable small bilateral pleural effusions and   right-sided infiltrates. No significant pneumothorax. Median sternotomy   wires.      IMPRESSION:  Interval removal of left chest drain. No significant pneumothorax.              "Thank you for the opportunity to participate in the care of this   patient."  ******PRELIMINARY REPORT******    ******PRELIMINARY REPORT******          ADIS BACA M.D., RADIOLOGY RESIDENT                        < end of copied text >

## 2018-12-25 NOTE — PROGRESS NOTE ADULT - SUBJECTIVE AND OBJECTIVE BOX
CTICU  CRITICAL  CARE  attending     Hand off received 					   Pertinent clinical, laboratory, radiographic, hemodynamic, echocardiographic, respiratory data, microbiologic data and chart were reviewed and analyzed frequently throughout the course of the day and night  Patient seen and examined with CTS/ SH attending at bedside        79yo admitted to [facility] for management of NSTEMI on 12/19/2018.  Pt with complicated presentation.  Per notes and family she was admitted to [facility] for workup of hemoptysis where she was diagnosed with invasive aspergillosis  Per Report, pt was started on Amphotericin which was complicated by HOLA, while hospitalized there she was noted to have NSTEMI and transferred to [facility] for Cardiac evaluation.  On her first night  of admission, pt noted to be in resp distress requiring intubation and subsequently developed shock.  TTE on 12/20 showed Flail posterior leaflet of mitral valve consistent with ruptured chordea.  [facility] non-occlussive CAD, Pt taken emergently to OR for MVR on 12/20    Post op course complicated by MOF - shock liver, renal failure, respiratory  failure  cardiogenic shock requiring IABP, Iontropic support.  There is also concern for neurologic event pt has right sided weakness.   Pt also noted to have elevated methemoglobinemia related to Anupam use immediate post op.  s/p treatment with methylene blue on 12/22    Pt has been in persistent  shock since the OR.   Current active issues include  1. Encephalopathy   2. Cardiogenic  shock requiring Iontropes IABP, increasing pressor support   3. HOLA now on CVVHD   4. Shock liver with improving AST/ALT, hyperbilirubinemia  5. Likely Rhabdo - elevated CPK 10K    HEALTH ISSUES - PROBLEM Dx:  Sepsis: Sepsis  Cardiogenic shock: Cardiogenic shock  HOLA (acute kidney injury): HOLA (acute kidney injury)  Hemoptysis: Hemoptysis  Pneumonia: Pneumonia      FAMILY HISTORY:  No pertinent family history in first degree relatives  PAST MEDICAL & SURGICAL HISTORY:  Asthma, unspecified asthma severity, unspecified whether complicated, unspecified whether persistent  High cholesterol  Hypertension    Patient is a 78y old  Female who presents with a chief complaint of NSTEMI (25 Dec 2018 17:10)      14 system review was unremarkable  acute changes include acute respiratory failure  Vital signs, hemodynamic and respiratory parameters were reviewed from the bedside nursing flow sheet.  ICU Vital Signs Last 24 Hrs  T(C): 35.9 (25 Dec 2018 18:00), Max: 36.4 (25 Dec 2018 10:00)  T(F): 96.7 (25 Dec 2018 18:00), Max: 97.5 (25 Dec 2018 10:00)  HR: 92 (25 Dec 2018 20:00) (76 - 106)  BP: --  BP(mean): --  ABP: 90/50 (25 Dec 2018 20:00) (80/42 - 116/66)  ABP(mean): 66 (25 Dec 2018 20:00) (56 - 84)  RR: 20 (25 Dec 2018 20:00) (5 - 27)  SpO2: 96% (25 Dec 2018 20:00) (92% - 99%)    Adult Advanced Hemodynamics Last 24 Hrs  CVP(mm Hg): 10 (25 Dec 2018 20:00) (5 - 17)  CVP(cm H2O): --  CO: 4.2 (25 Dec 2018 08:00) (4.2 - 5.9)  CI: 2.4 (25 Dec 2018 08:00) (2.4 - 3.5)  PA: 27/15 (25 Dec 2018 20:00) (24/13 - 38/23)  PA(mean): 20 (25 Dec 2018 20:00) (18 - 29)  PCWP: --  SVR: 989 (25 Dec 2018 08:00) (880 - 1078)  SVRI: 1731 (25 Dec 2018 08:00) (1483 - 1804)  PVR: --  PVRI: --, ABG - ( 25 Dec 2018 16:08 )  pH, Arterial: 7.43  pH, Blood: x     /  pCO2: 35    /  pO2: 83    / HCO3: 23    / Base Excess: -1.2  /  SaO2: 96                Mode: AC/ CMV (Assist Control/ Continuous Mandatory Ventilation)  RR (machine): 12  TV (machine): 450  FiO2: 70  PEEP: 5  ITime: 0.1  MAP: 8  PIP: 18    Intake and output was reviewed and the fluid balance was calculated  Daily     Daily   I&O's Summary    24 Dec 2018 07:01  -  25 Dec 2018 07:00  --------------------------------------------------------  IN: 4991.2 mL / OUT: 2715 mL / NET: 2276.2 mL    25 Dec 2018 07:01  -  25 Dec 2018 21:11  --------------------------------------------------------  IN: 2829.5 mL / OUT: 666 mL / NET: 2163.5 mL        All lines and drain sites were assessed  Glycemic trend was reviewed CAPILLARY BLOOD GLUCOSE      POCT Blood Glucose.: 133 mg/dL (25 Dec 2018 02:21)    NEURO: PUPILS 2-3 mm (Reactive). + Right Hemiparesis. + Encephalopathy.  CVS: S1, S2, No S3,  RESPIRATORY: Auscultation of the chest reveals equal breath sounds.  GI: Abdomen is soft. Distended. Hypoactive Bowel sounds. + Sacral Decubitis. + Anasarca  Extremities are warm and well perfused. + upper and lower extremity edema.  VASCULAR: + Distal pulses.  SKIN: +  ICTERUS. Multiple areas of Ecchymosis and skin break down.   Wounds appear clean and unremarkable  Antibiotics are Zosyn, Micafungin, vancomycin.          labs  CBC Full  -  ( 25 Dec 2018 16:14 )  WBC Count : 23.6 K/uL  Hemoglobin : 10.6 g/dL  Hematocrit : 29.9 %  Platelet Count - Automated : 139 K/uL  Mean Cell Volume : 81.9 fL  Mean Cell Hemoglobin : 29.0 pg  Mean Cell Hemoglobin Concentration : 35.5 g/dL  Auto Neutrophil # : x  Auto Lymphocyte # : x  Auto Monocyte # : x  Auto Eosinophil # : x  Auto Basophil # : x  Auto Neutrophil % : x  Auto Lymphocyte % : x  Auto Monocyte % : x  Auto Eosinophil % : x  Auto Basophil % : x    12-25    135  |  96  |  26<H>  ----------------------------<  120<H>  3.8   |  21<L>  |  0.92    Ca    9.2      25 Dec 2018 16:14  Phos  2.2     12-25  Mg     1.8     12-25    TPro  4.0<L>  /  Alb  2.8<L>  /  TBili  23.0<H>  /  DBili  x   /  AST  2293<H>  /  ALT  392<H>  /  AlkPhos  235<H>  12-25    PT/INR - ( 25 Dec 2018 16:14 )   PT: 18.0 sec;   INR: 1.57          PTT - ( 25 Dec 2018 16:14 )  PTT:33.7 sec  The current medications were reviewed   MEDICATIONS  (STANDING):  ascorbic acid Syrup 500 milliGRAM(s) Oral two times a day  aspirin  chewable 81 milliGRAM(s) Oral daily  calcium gluconate IVPB 1 Gram(s) IV Intermittent every 6 hours  chlorhexidine 0.12% Liquid 15 milliLiter(s) Oral Mucosa two times a day  chlorhexidine 2% Cloths 1 Application(s) Topical <User Schedule>  CRRT Treatment    <Continuous>  dextrose 50% Injectable 50 milliLiter(s) IV Push every 15 minutes  DOBUTamine Infusion 3 MICROgram(s)/kG/Min (5.427 mL/Hr) IV Continuous <Continuous>  hydrocortisone sodium succinate Injectable 75 milliGRAM(s) IV Push every 8 hours  insulin Infusion 2 Unit(s)/Hr (2 mL/Hr) IV Continuous <Continuous>  micafungin IVPB 100 milliGRAM(s) IV Intermittent every 24 hours  milrinone Infusion 0.125 MICROgram(s)/kG/Min (2.261 mL/Hr) IV Continuous <Continuous>  pantoprazole Infusion 8 mG/Hr (10 mL/Hr) IV Continuous <Continuous>  phenylephrine    Infusion 2 MICROgram(s)/kG/Min (22.613 mL/Hr) IV Continuous <Continuous>  piperacillin/tazobactam IVPB. 3.375 Gram(s) IV Intermittent every 6 hours  propofol Infusion 5 MICROgram(s)/kG/Min (1.809 mL/Hr) IV Continuous <Continuous>  PureFlow Dialysate RFP-400 (K 2 / Ca 3) 5000 milliLiter(s) (1500 mL/Hr) CRRT <Continuous>  sodium chloride 0.9%. 1000 milliLiter(s) (10 mL/Hr) IV Continuous <Continuous>  vancomycin  IVPB 1000 milliGRAM(s) IV Intermittent every 24 hours  vasopressin Infusion 0.01 Unit(s)/Min (0.6 mL/Hr) IV Continuous <Continuous>    MEDICATIONS  (PRN):  ALBUTerol/ipratropium for Nebulization 3 milliLiter(s) Nebulizer every 6 hours PRN Shortness of Breath and/or Wheezing      PROBLEM LIST/ ASSESSMENT:  HEALTH ISSUES - PROBLEM Dx:      Patient is a 78y old  Female who presented to [facility] with NSTEMI   Transferred   to [facility] in cardiogenic shock.  S/P Emergent MVR   Hemodynamically  Stable after discontinuation of the intraaortic balloon pump.  Tolerating CVVHD  Good oxygenation.  Sepsis: Sepsis  Metabolic acidosis  Anasarca  Severe Jaundice.  Thrombocytopenia  Cardiogenic shock: Cardiogenic shock  HOLA (acute kidney injury): HOLA (acute kidney injury)  Hemoptysis: Hemoptysis  Pneumonia: Pneumonia          My plan includes :  IV antibiotics.  Close hemodynamic, ventilator and drain management.  CVVHD  Monitor for arrhythmias and monitor parameters for organ perfusion  Monitor neurologic status.  Head of the bed should remain elevated to 45 deg .   Chest PT and IS will be encouraged  Monitor adequacy of oxygenation and ventilation and attempt to wean oxygen  Nutritional goals will be met using po eventually , ensure adequate caloric intake and monitor  the same  Stress ulcer and VTE prophylaxis will be achieved    Glycemic control is satisfactory.  Electrolytes have been repleted as necessary and wound care has been carried out. Pain control has been achieved.   Aggressive physical therapy and early mobility and ambulation goals will be met   CT head when able to transport.  Consider early tracheostomy.  The family was updated about the course and plan  CRITICAL CARE TIME SPENT in evaluation and management, reassessments, review and interpretation of labs and x-rays, ventilator and hemodynamic management, formulating a plan and coordinating care: ___60____ MIN.  Time does not include procedural time.  CTICU ATTENDING     					      Javier Heaton MD

## 2018-12-25 NOTE — PROGRESS NOTE ADULT - ASSESSMENT
78 now in shock s/p cardiac surgery - oliguric HOLA from ATn secondary to shock, still requring pressors and inotrope. Patient on  CVVHD today - negative 2.9 liters.

## 2018-12-25 NOTE — PROGRESS NOTE ADULT - PROBLEM SELECTOR PLAN 1
Anuric HOLA due to ischemic ATN from cardiogenic shock on CVVHD  Cr 0.9  Strict I/o  Daily weight  Monitor urine output and bladder scan  Continue CVVHD  Goal fluid balance to be net neutral to net negative in next 24 hours duration  Monitor CMP, Mag, phos, Giovani, CBC every 6 hrly

## 2018-12-25 NOTE — CHART NOTE - NSCHARTNOTEFT_GEN_A_CORE
As per Dr. Silva right pleural chest tube removed at without incident. U stitch tied down in place and occlusive dressing applied. Subsequently mediastinal yesica removed at bedside, U stitch tied down in place and occlusive dressing applied. Patient tolerated procedure well. Follow up CXR stable with no obvious ptx

## 2018-12-25 NOTE — PROGRESS NOTE ADULT - SUBJECTIVE AND OBJECTIVE BOX
77yo admitted to [facility] for management of NSTEMI on 12/19/2018.  Pt with complicated presentation.  Per notes and family she was admitted to [facility] for workup of hemoptysis where she was diagnosed with invasive aspergillosis  Per Report, pt was started on Amphotericin which was complicated by HOLA, while hospitalized there she was noted to have NSTEMI and transferred to [facility] for Cardiac evaluation.  On her first night  of admission, pt noted to be in resp distress requiring intubation and subsequently developed shock.  TTE on 12/20 showed Flail posterior leaflet of mitral valve consistent with ruptured chordea.  Pt taken emergently to OR for MVR on 12/20    Post op course complicated by MOF - shock liver, renal failure, resp failure  cardiogenic shock requiring IABP, Iontropic support.  There is also concern for neurologic event pt has right sided weakness.   Pt also noted to have elevated methemoglobinemia related to Anupam use immediate post op.  s/p treatment with methylene blue 79yo admitted to [facility] for management of NSTEMI on 12/19/2018.  Pt with complicated presentation.  Per notes and family she was admitted to [facility] for workup of hemoptysis where she was diagnosed with invasive aspergillosis  Per Report, pt was started on Amphotericin which was complicated by HOLA, while hospitalized there she was noted to have NSTEMI and transferred to [facility] for Cardiac evaluation.  On her first night  of admission, pt noted to be in resp distress requiring intubation and subsequently developed shock.  TTE on 12/20 showed Flail posterior leaflet of mitral valve consistent with ruptured chordea.  [facility] non-occlussive CAD, Pt taken emergently to OR for MVR on 12/20    Post op course complicated by MOF - shock liver, renal failure, resp failure  cardiogenic shock requiring IABP, Iontropic support.  There is also concern for neurologic event pt has right sided weakness.   Pt also noted to have elevated methemoglobinemia related to Anupam use immediate post op.  s/p treatment with methylene blue on 12/22    Pt has been in persisent shock since the OR.   Current active issues include  1. Encephalopathy   2. cardiogenic shock requiring Iontropes IABP, increasing pressor support   3. HOLA now CVVH   4. Shock liver with improving AST/ALT, hyperbilirubinemia  5. Likely Rhabdo - elevated CPK 10K        PMH :  NSTEMI  Asthma, unspecified asthma severity, unspecified whether complicated, unspecified whether persistent  High cholesterol  Hypertension  Mitral regurgitation  Papillary muscle rupture  Cardiogenic shock  HOLA (acute kidney injury)  Hemoptysis  Pneumonia  Mitral valve replacement    ICU Vital Signs Last 24 Hrs  T(C): 36.3 (12-25-18 @ 14:00), Max: 36.4 (12-25-18 @ 10:00)  T(F): 97.3 (12-25-18 @ 14:00), Max: 97.5 (12-25-18 @ 10:00)  HR: 92 (12-25-18 @ 16:58) (76 - 106)  ABP: 94/52 (12-25-18 @ 16:00) (80/42 - 116/66)  ABP(mean): 68 (12-25-18 @ 16:00) (56 - 84)  RR: 18 (12-25-18 @ 16:00) (5 - 24)  SpO2: 96% (12-25-18 @ 16:58) (92% - 98%)    I&O's Summary    24 Dec 2018 07:01  -  25 Dec 2018 07:00  --------------------------------------------------------  IN: 4991.2 mL / OUT: 2715 mL / NET: 2276.2 mL    25 Dec 2018 07:01  -  25 Dec 2018 17:36  --------------------------------------------------------  IN: 1669.6 mL / OUT: 338 mL / NET: 1331.6 mL      Mode: AC/ CMV (Assist Control/ Continuous Mandatory Ventilation)  RR (machine): 12  TV (machine): 450  FiO2: 70  PEEP: 5  ITime: 0.1  MAP: 8  PIP: 22    ABG - ( 25 Dec 2018 16:08 )  pH: 7.43  /  pCO2: 35    /  pO2: 83    / HCO3: 23    / Base Excess: -1.2  /  SaO2: 96                              10.6   23.6  )-----------( 139      ( 25 Dec 2018 16:14 )             29.9     25 Dec 2018 16:14    135    |  96     |  26     ----------------------------<  120    3.8     |  21     |  0.92     Ca    9.2        25 Dec 2018 16:14  Phos  2.2       25 Dec 2018 16:14  Mg     1.8       25 Dec 2018 16:14    TPro  4.0    /  Alb  2.8    /  TBili  23.0   /  DBili  x      /  AST  2293   /  ALT  392    /  AlkPhos  235    25 Dec 2018 16:14    PT/INR - ( 25 Dec 2018 16:14 )   PT: 18.0 sec;   INR: 1.57     PTT - ( 25 Dec 2018 16:14 )  PTT:33.7 sec    MEDICATIONS  (STANDING):  ascorbic acid Syrup 500 milliGRAM(s) Oral two times a day  aspirin  chewable 81 milliGRAM(s) Oral daily  DOBUTamine Infusion 3 MICROgram(s)/kG/Min IV Continuous <Continuous>  hydrocortisone sodium succinate Injectable 75 milliGRAM(s) IV Push every 8 hours  insulin Infusion 2 Unit(s)/Hr IV Continuous <Continuous>  micafungin IVPB 100 milliGRAM(s) IV Intermittent every 24 hours  milrinone Infusion 0.125 MICROgram(s)/kG/Min IV Continuous <Continuous>  pantoprazole Infusion 8 mG/Hr IV Continuous <Continuous>  phenylephrine    Infusion 0.2 MICROgram(s)/kG/Min IV Continuous <Continuous>  piperacillin/tazobactam IVPB. 3.375 Gram(s) IV Intermittent every 6 hours  potassium phosphate IVPB 30 milliMole(s) IV Intermittent once  vancomycin  IVPB 1000 milliGRAM(s) IV Intermittent every 24 hours  vasopressin Infusion 0.01 Unit(s)/Min IV Continuous <Continuous>    Home Medications:  alendronate 70 mg oral tablet: 1 tab(s) orally once a week (19 Dec 2018 16:40)  flecainide 100 mg oral tablet: 1 tab(s) orally every 12 hours (19 Dec 2018 16:40)  fluticasone-vilanterol 200 mcg-25 mcg/inh inhalation powder: 1 puff(s) inhaled once a day (19 Dec 2018 16:40)  losartan 50 mg oral tablet: 1 tab(s) orally once a day (19 Dec 2018 16:40)  montelukast 10 mg oral tablet: 1 tab(s) orally once a day (19 Dec 2018 16:40)  nebivolol 5 mg oral tablet: 1 tab(s) orally once a day (19 Dec 2018 16:40)  ondansetron 4 mg oral tablet: 1 tab(s) orally every 8 hours (19 Dec 2018 16:40)  pantoprazole 40 mg oral delayed release tablet: 1 tab(s) orally once a day (19 Dec 2018 16:40)  simvastatin 20 mg oral tablet: 1 tab(s) orally once a day (at bedtime) (19 Dec 2018 16:40)  voriconazole 200 mg oral tablet: 1 tab(s) orally every 12 hours (19 Dec 2018 16:40)    PHYSICAL EXAM:  Gen : intubated, ill appearing, Jaundice and cyanosis noted   Neck: No LAD, No JVD  Respiratory: decreased in the bases  Cardiovascular: S1 and S2, irregular   Gastrointestinal: BS+, soft, NT/ND  Extremities: 3+ pitting edema   Vascular: 2+ peripheral pulses  Neurological: moves left side spontaneously   Skin : + cynosis    MSK : no weakness, normal gait   Incision: clean dry/ no sign of infection  Lines: no sign of infection

## 2018-12-26 LAB
ALBUMIN SERPL ELPH-MCNC: 2.2 G/DL — LOW (ref 3.3–5)
ALBUMIN SERPL ELPH-MCNC: 2.5 G/DL — LOW (ref 3.3–5)
ALBUMIN SERPL ELPH-MCNC: 2.8 G/DL — LOW (ref 3.3–5)
ALBUMIN SERPL ELPH-MCNC: 2.9 G/DL — LOW (ref 3.3–5)
ALP SERPL-CCNC: 229 U/L — HIGH (ref 40–120)
ALP SERPL-CCNC: 241 U/L — HIGH (ref 40–120)
ALP SERPL-CCNC: 241 U/L — HIGH (ref 40–120)
ALP SERPL-CCNC: 249 U/L — HIGH (ref 40–120)
ALT FLD-CCNC: 194 U/L — HIGH (ref 10–45)
ALT FLD-CCNC: 232 U/L — HIGH (ref 10–45)
ALT FLD-CCNC: 251 U/L — HIGH (ref 10–45)
ALT FLD-CCNC: 280 U/L — HIGH (ref 10–45)
ANION GAP SERPL CALC-SCNC: 15 MMOL/L — SIGNIFICANT CHANGE UP (ref 5–17)
ANION GAP SERPL CALC-SCNC: 18 MMOL/L — HIGH (ref 5–17)
ANION GAP SERPL CALC-SCNC: 20 MMOL/L — HIGH (ref 5–17)
ANION GAP SERPL CALC-SCNC: 23 MMOL/L — HIGH (ref 5–17)
ANISOCYTOSIS BLD QL: SLIGHT — SIGNIFICANT CHANGE UP
APTT BLD: 38.4 SEC — HIGH (ref 27.5–36.3)
APTT BLD: 41.7 SEC — HIGH (ref 27.5–36.3)
APTT BLD: 43.4 SEC — HIGH (ref 27.5–36.3)
APTT BLD: 47.2 SEC — HIGH (ref 27.5–36.3)
ASPERGILLUS FLAVUS PRECIPITINS: NEGATIVE — SIGNIFICANT CHANGE UP
ASPERGILLUS FLAVUS PRECIPITINS: NEGATIVE — SIGNIFICANT CHANGE UP
ASPERGILLUS NIGER PRECIPITINS: NEGATIVE — SIGNIFICANT CHANGE UP
ASPERGILLUS NIGER PRECIPITINS: NEGATIVE — SIGNIFICANT CHANGE UP
AST SERPL-CCNC: 1239 U/L — HIGH (ref 10–40)
AST SERPL-CCNC: 601 U/L — HIGH (ref 10–40)
AST SERPL-CCNC: 804 U/L — HIGH (ref 10–40)
AST SERPL-CCNC: 901 U/L — HIGH (ref 10–40)
BASE EXCESS BLDA CALC-SCNC: -2.2 MMOL/L — LOW (ref -2–3)
BASE EXCESS BLDA CALC-SCNC: -2.5 MMOL/L — LOW (ref -2–3)
BILIRUB SERPL-MCNC: 19.9 MG/DL — HIGH (ref 0.2–1.2)
BILIRUB SERPL-MCNC: 21.6 MG/DL — HIGH (ref 0.2–1.2)
BILIRUB SERPL-MCNC: 22 MG/DL — HIGH (ref 0.2–1.2)
BILIRUB SERPL-MCNC: 22 MG/DL — HIGH (ref 0.2–1.2)
BUN SERPL-MCNC: 27 MG/DL — HIGH (ref 7–23)
BUN SERPL-MCNC: 28 MG/DL — HIGH (ref 7–23)
BUN SERPL-MCNC: 32 MG/DL — HIGH (ref 7–23)
BUN SERPL-MCNC: 38 MG/DL — HIGH (ref 7–23)
CALCIUM SERPL-MCNC: 8.8 MG/DL — SIGNIFICANT CHANGE UP (ref 8.4–10.5)
CALCIUM SERPL-MCNC: 8.9 MG/DL — SIGNIFICANT CHANGE UP (ref 8.4–10.5)
CHLORIDE SERPL-SCNC: 100 MMOL/L — SIGNIFICANT CHANGE UP (ref 96–108)
CHLORIDE SERPL-SCNC: 96 MMOL/L — SIGNIFICANT CHANGE UP (ref 96–108)
CK SERPL-CCNC: 5949 U/L — HIGH (ref 25–170)
CO2 SERPL-SCNC: 16 MMOL/L — LOW (ref 22–31)
CO2 SERPL-SCNC: 18 MMOL/L — LOW (ref 22–31)
CO2 SERPL-SCNC: 20 MMOL/L — LOW (ref 22–31)
CO2 SERPL-SCNC: 20 MMOL/L — LOW (ref 22–31)
COHGB MFR BLDA: 0.1 % — SIGNIFICANT CHANGE UP
CREAT SERPL-MCNC: 0.89 MG/DL — SIGNIFICANT CHANGE UP (ref 0.5–1.3)
CREAT SERPL-MCNC: 0.91 MG/DL — SIGNIFICANT CHANGE UP (ref 0.5–1.3)
CREAT SERPL-MCNC: 1.06 MG/DL — SIGNIFICANT CHANGE UP (ref 0.5–1.3)
CREAT SERPL-MCNC: 1.34 MG/DL — HIGH (ref 0.5–1.3)
DEPRECATED A FUMIGATUS IGG RAST QL: NEGATIVE — SIGNIFICANT CHANGE UP
DEPRECATED A FUMIGATUS IGG RAST QL: NEGATIVE — SIGNIFICANT CHANGE UP
GAS PNL BLDA: SIGNIFICANT CHANGE UP
GAS PNL BLDV: SIGNIFICANT CHANGE UP
GLUCOSE SERPL-MCNC: 144 MG/DL — HIGH (ref 70–99)
GLUCOSE SERPL-MCNC: 164 MG/DL — HIGH (ref 70–99)
GLUCOSE SERPL-MCNC: 183 MG/DL — HIGH (ref 70–99)
GLUCOSE SERPL-MCNC: 191 MG/DL — HIGH (ref 70–99)
HCO3 BLDA-SCNC: 20 MMOL/L — LOW (ref 21–28)
HCO3 BLDA-SCNC: 21 MMOL/L — SIGNIFICANT CHANGE UP (ref 21–28)
HCT VFR BLD CALC: 30.7 % — LOW (ref 34.5–45)
HCT VFR BLD CALC: 31 % — LOW (ref 34.5–45)
HCT VFR BLD CALC: 31.8 % — LOW (ref 34.5–45)
HCT VFR BLD CALC: 32.1 % — LOW (ref 34.5–45)
HGB BLD-MCNC: 10.8 G/DL — LOW (ref 11.5–15.5)
HGB BLD-MCNC: 10.8 G/DL — LOW (ref 11.5–15.5)
HGB BLD-MCNC: 11 G/DL — LOW (ref 11.5–15.5)
HGB BLD-MCNC: 11 G/DL — LOW (ref 11.5–15.5)
HGB BLDA-MCNC: 10.6 G/DL — LOW (ref 11.5–15.5)
INR BLD: 1.83 — HIGH (ref 0.88–1.16)
INR BLD: 1.93 — HIGH (ref 0.88–1.16)
INR BLD: 1.95 — HIGH (ref 0.88–1.16)
INR BLD: 1.97 — HIGH (ref 0.88–1.16)
LACTATE SERPL-SCNC: 3.6 MMOL/L — HIGH (ref 0.5–2)
LACTATE SERPL-SCNC: 4.1 MMOL/L — CRITICAL HIGH (ref 0.5–2)
LACTATE SERPL-SCNC: 5.6 MMOL/L — CRITICAL HIGH (ref 0.5–2)
LACTATE SERPL-SCNC: 6.8 MMOL/L — CRITICAL HIGH (ref 0.5–2)
LACTATE SERPL-SCNC: 7.8 MMOL/L — CRITICAL HIGH (ref 0.5–2)
LG PLATELETS BLD QL AUTO: PRESENT — SIGNIFICANT CHANGE UP
LYMPHOCYTES # BLD AUTO: 1 % — LOW (ref 13–44)
MACROCYTES BLD QL: SLIGHT — SIGNIFICANT CHANGE UP
MAGNESIUM SERPL-MCNC: 2 MG/DL — SIGNIFICANT CHANGE UP (ref 1.6–2.6)
MAGNESIUM SERPL-MCNC: 2.2 MG/DL — SIGNIFICANT CHANGE UP (ref 1.6–2.6)
MAGNESIUM SERPL-MCNC: 2.2 MG/DL — SIGNIFICANT CHANGE UP (ref 1.6–2.6)
MANUAL DIF COMMENT BLD-IMP: SIGNIFICANT CHANGE UP
MANUAL SMEAR VERIFICATION: SIGNIFICANT CHANGE UP
MCHC RBC-ENTMCNC: 28.1 PG — SIGNIFICANT CHANGE UP (ref 27–34)
MCHC RBC-ENTMCNC: 28.5 PG — SIGNIFICANT CHANGE UP (ref 27–34)
MCHC RBC-ENTMCNC: 28.8 PG — SIGNIFICANT CHANGE UP (ref 27–34)
MCHC RBC-ENTMCNC: 29.3 PG — SIGNIFICANT CHANGE UP (ref 27–34)
MCHC RBC-ENTMCNC: 34.3 G/DL — SIGNIFICANT CHANGE UP (ref 32–36)
MCHC RBC-ENTMCNC: 34.6 G/DL — SIGNIFICANT CHANGE UP (ref 32–36)
MCHC RBC-ENTMCNC: 34.8 G/DL — SIGNIFICANT CHANGE UP (ref 32–36)
MCHC RBC-ENTMCNC: 35.2 G/DL — SIGNIFICANT CHANGE UP (ref 32–36)
MCV RBC AUTO: 80.5 FL — SIGNIFICANT CHANGE UP (ref 80–100)
MCV RBC AUTO: 81 FL — SIGNIFICANT CHANGE UP (ref 80–100)
MCV RBC AUTO: 83.2 FL — SIGNIFICANT CHANGE UP (ref 80–100)
MCV RBC AUTO: 85.6 FL — SIGNIFICANT CHANGE UP (ref 80–100)
METAMYELOCYTES # FLD: 8 % — HIGH
METHGB MFR BLDA: 0.7 % — SIGNIFICANT CHANGE UP
MICROCYTES BLD QL: SLIGHT — SIGNIFICANT CHANGE UP
MONOCYTES NFR BLD AUTO: 5 % — SIGNIFICANT CHANGE UP (ref 2–14)
MYELOCYTES NFR BLD: 1 % — HIGH
NEUTROPHILS NFR BLD AUTO: 61 % — SIGNIFICANT CHANGE UP (ref 43–77)
NEUTS BAND # BLD: 24 % — HIGH
NEUTS VAC BLD QL SMEAR: PRESENT — SIGNIFICANT CHANGE UP
NRBC # BLD: 3 /100 WBCS — HIGH
O2 CT VFR BLDA CALC: SIGNIFICANT CHANGE UP (ref 15–23)
OVALOCYTES BLD QL SMEAR: SLIGHT — SIGNIFICANT CHANGE UP
OXYHGB MFR BLDA: 97 % — SIGNIFICANT CHANGE UP (ref 94–100)
PCO2 BLDA: 28 MMHG — LOW (ref 32–45)
PCO2 BLDA: 31 MMHG — LOW (ref 32–45)
PH BLDA: 7.45 — SIGNIFICANT CHANGE UP (ref 7.35–7.45)
PH BLDA: 7.48 — HIGH (ref 7.35–7.45)
PHOSPHATE SERPL-MCNC: 2.6 MG/DL — SIGNIFICANT CHANGE UP (ref 2.5–4.5)
PHOSPHATE SERPL-MCNC: 4 MG/DL — SIGNIFICANT CHANGE UP (ref 2.5–4.5)
PLAT MORPH BLD: ABNORMAL
PLATELET # BLD AUTO: 57 K/UL — LOW (ref 150–400)
PLATELET # BLD AUTO: 62 K/UL — LOW (ref 150–400)
PLATELET # BLD AUTO: 71 K/UL — LOW (ref 150–400)
PLATELET # BLD AUTO: 76 K/UL — LOW (ref 150–400)
PO2 BLDA: 104 MMHG — SIGNIFICANT CHANGE UP (ref 83–108)
PO2 BLDA: 75 MMHG — LOW (ref 83–108)
POLYCHROMASIA BLD QL SMEAR: SLIGHT — SIGNIFICANT CHANGE UP
POTASSIUM SERPL-MCNC: 3.6 MMOL/L — SIGNIFICANT CHANGE UP (ref 3.5–5.3)
POTASSIUM SERPL-MCNC: 3.9 MMOL/L — SIGNIFICANT CHANGE UP (ref 3.5–5.3)
POTASSIUM SERPL-MCNC: 4 MMOL/L — SIGNIFICANT CHANGE UP (ref 3.5–5.3)
POTASSIUM SERPL-MCNC: 4.3 MMOL/L — SIGNIFICANT CHANGE UP (ref 3.5–5.3)
POTASSIUM SERPL-SCNC: 3.6 MMOL/L — SIGNIFICANT CHANGE UP (ref 3.5–5.3)
POTASSIUM SERPL-SCNC: 3.9 MMOL/L — SIGNIFICANT CHANGE UP (ref 3.5–5.3)
POTASSIUM SERPL-SCNC: 4 MMOL/L — SIGNIFICANT CHANGE UP (ref 3.5–5.3)
POTASSIUM SERPL-SCNC: 4.3 MMOL/L — SIGNIFICANT CHANGE UP (ref 3.5–5.3)
PROT SERPL-MCNC: 3.2 G/DL — LOW (ref 6–8.3)
PROT SERPL-MCNC: 3.7 G/DL — LOW (ref 6–8.3)
PROTHROM AB SERPL-ACNC: 21.1 SEC — HIGH (ref 10–12.9)
PROTHROM AB SERPL-ACNC: 22.3 SEC — HIGH (ref 10–12.9)
PROTHROM AB SERPL-ACNC: 22.5 SEC — HIGH (ref 10–12.9)
PROTHROM AB SERPL-ACNC: 22.7 SEC — HIGH (ref 10–12.9)
RBC # BLD: 3.75 M/UL — LOW (ref 3.8–5.2)
RBC # BLD: 3.79 M/UL — LOW (ref 3.8–5.2)
RBC # BLD: 3.82 M/UL — SIGNIFICANT CHANGE UP (ref 3.8–5.2)
RBC # BLD: 3.85 M/UL — SIGNIFICANT CHANGE UP (ref 3.8–5.2)
RBC # FLD: 18 % — HIGH (ref 10.3–16.9)
RBC # FLD: 18.2 % — HIGH (ref 10.3–16.9)
RBC # FLD: 18.3 % — HIGH (ref 10.3–16.9)
RBC # FLD: 18.9 % — HIGH (ref 10.3–16.9)
RBC BLD AUTO: ABNORMAL
SAO2 % BLDA: 95 % — SIGNIFICANT CHANGE UP (ref 95–100)
SAO2 % BLDA: 98 % — SIGNIFICANT CHANGE UP (ref 95–100)
SCHISTOCYTES BLD QL AUTO: SIGNIFICANT CHANGE UP
SMUDGE CELLS # BLD: SIGNIFICANT CHANGE UP
SODIUM SERPL-SCNC: 134 MMOL/L — LOW (ref 135–145)
SODIUM SERPL-SCNC: 134 MMOL/L — LOW (ref 135–145)
SODIUM SERPL-SCNC: 135 MMOL/L — SIGNIFICANT CHANGE UP (ref 135–145)
SODIUM SERPL-SCNC: 135 MMOL/L — SIGNIFICANT CHANGE UP (ref 135–145)
TOXIC GRANULES BLD QL SMEAR: SLIGHT — SIGNIFICANT CHANGE UP
WBC # BLD: 25 K/UL — HIGH (ref 3.8–10.5)
WBC # BLD: 26.2 K/UL — HIGH (ref 3.8–10.5)
WBC # BLD: 29 K/UL — HIGH (ref 3.8–10.5)
WBC # BLD: 35.3 K/UL — HIGH (ref 3.8–10.5)
WBC # FLD AUTO: 25 K/UL — HIGH (ref 3.8–10.5)
WBC # FLD AUTO: 26.2 K/UL — HIGH (ref 3.8–10.5)
WBC # FLD AUTO: 29 K/UL — HIGH (ref 3.8–10.5)
WBC # FLD AUTO: 35.3 K/UL — HIGH (ref 3.8–10.5)

## 2018-12-26 PROCEDURE — 99292 CRITICAL CARE ADDL 30 MIN: CPT

## 2018-12-26 PROCEDURE — 99233 SBSQ HOSP IP/OBS HIGH 50: CPT

## 2018-12-26 PROCEDURE — 71045 X-RAY EXAM CHEST 1 VIEW: CPT | Mod: 26

## 2018-12-26 PROCEDURE — 99291 CRITICAL CARE FIRST HOUR: CPT

## 2018-12-26 RX ORDER — ALBUMIN HUMAN 25 %
50 VIAL (ML) INTRAVENOUS
Qty: 0 | Refills: 0 | Status: COMPLETED | OUTPATIENT
Start: 2018-12-26 | End: 2018-12-26

## 2018-12-26 RX ORDER — HYDROCORTISONE 20 MG
50 TABLET ORAL EVERY 12 HOURS
Qty: 0 | Refills: 0 | Status: DISCONTINUED | OUTPATIENT
Start: 2018-12-26 | End: 2018-12-26

## 2018-12-26 RX ORDER — HYDROCORTISONE 20 MG
50 TABLET ORAL EVERY 12 HOURS
Qty: 0 | Refills: 0 | Status: DISCONTINUED | OUTPATIENT
Start: 2018-12-26 | End: 2018-12-27

## 2018-12-26 RX ORDER — POTASSIUM CHLORIDE 20 MEQ
20 PACKET (EA) ORAL ONCE
Qty: 0 | Refills: 0 | Status: COMPLETED | OUTPATIENT
Start: 2018-12-26 | End: 2018-12-26

## 2018-12-26 RX ORDER — EPINEPHRINE 0.3 MG/.3ML
0.03 INJECTION INTRAMUSCULAR; SUBCUTANEOUS
Qty: 8 | Refills: 0 | Status: DISCONTINUED | OUTPATIENT
Start: 2018-12-26 | End: 2018-12-27

## 2018-12-26 RX ORDER — CISATRACURIUM BESYLATE 2 MG/ML
3 INJECTION INTRAVENOUS
Qty: 200 | Refills: 0 | Status: DISCONTINUED | OUTPATIENT
Start: 2018-12-26 | End: 2018-12-27

## 2018-12-26 RX ORDER — ALBUMIN HUMAN 25 %
250 VIAL (ML) INTRAVENOUS ONCE
Qty: 0 | Refills: 0 | Status: COMPLETED | OUTPATIENT
Start: 2018-12-26 | End: 2018-12-26

## 2018-12-26 RX ORDER — EPINEPHRINE 0.3 MG/.3ML
0.02 INJECTION INTRAMUSCULAR; SUBCUTANEOUS
Qty: 4 | Refills: 0 | Status: DISCONTINUED | OUTPATIENT
Start: 2018-12-26 | End: 2018-12-26

## 2018-12-26 RX ORDER — PANTOPRAZOLE SODIUM 20 MG/1
40 TABLET, DELAYED RELEASE ORAL DAILY
Qty: 0 | Refills: 0 | Status: DISCONTINUED | OUTPATIENT
Start: 2018-12-26 | End: 2018-12-27

## 2018-12-26 RX ADMIN — Medication 125 MILLILITER(S): at 04:45

## 2018-12-26 RX ADMIN — PROPOFOL 1.81 MICROGRAM(S)/KG/MIN: 10 INJECTION, EMULSION INTRAVENOUS at 08:01

## 2018-12-26 RX ADMIN — PIPERACILLIN AND TAZOBACTAM 200 GRAM(S): 4; .5 INJECTION, POWDER, LYOPHILIZED, FOR SOLUTION INTRAVENOUS at 05:25

## 2018-12-26 RX ADMIN — Medication 200 GRAM(S): at 23:07

## 2018-12-26 RX ADMIN — Medication 200 GRAM(S): at 12:16

## 2018-12-26 RX ADMIN — CHLORHEXIDINE GLUCONATE 15 MILLILITER(S): 213 SOLUTION TOPICAL at 05:26

## 2018-12-26 RX ADMIN — Medication 100 MILLIEQUIVALENT(S): at 04:57

## 2018-12-26 RX ADMIN — Medication 50 MILLILITER(S): at 04:41

## 2018-12-26 RX ADMIN — Medication 50 MILLIGRAM(S): at 18:59

## 2018-12-26 RX ADMIN — Medication 200 GRAM(S): at 05:02

## 2018-12-26 RX ADMIN — CHLORHEXIDINE GLUCONATE 15 MILLILITER(S): 213 SOLUTION TOPICAL at 18:59

## 2018-12-26 RX ADMIN — PANTOPRAZOLE SODIUM 40 MILLIGRAM(S): 20 TABLET, DELAYED RELEASE ORAL at 12:24

## 2018-12-26 RX ADMIN — Medication 250 MILLIGRAM(S): at 20:08

## 2018-12-26 RX ADMIN — Medication 75 MILLIGRAM(S): at 05:26

## 2018-12-26 RX ADMIN — Medication 200 GRAM(S): at 01:00

## 2018-12-26 RX ADMIN — PIPERACILLIN AND TAZOBACTAM 200 GRAM(S): 4; .5 INJECTION, POWDER, LYOPHILIZED, FOR SOLUTION INTRAVENOUS at 00:59

## 2018-12-26 RX ADMIN — MICAFUNGIN SODIUM 105 MILLIGRAM(S): 100 INJECTION, POWDER, LYOPHILIZED, FOR SOLUTION INTRAVENOUS at 22:45

## 2018-12-26 RX ADMIN — Medication 500 MILLIGRAM(S): at 07:05

## 2018-12-26 RX ADMIN — PIPERACILLIN AND TAZOBACTAM 200 GRAM(S): 4; .5 INJECTION, POWDER, LYOPHILIZED, FOR SOLUTION INTRAVENOUS at 12:24

## 2018-12-26 RX ADMIN — CHLORHEXIDINE GLUCONATE 1 APPLICATION(S): 213 SOLUTION TOPICAL at 07:14

## 2018-12-26 RX ADMIN — Medication 50 MILLILITER(S): at 07:06

## 2018-12-26 RX ADMIN — Medication 200 GRAM(S): at 19:50

## 2018-12-26 RX ADMIN — MILRINONE LACTATE 2.26 MICROGRAM(S)/KG/MIN: 1 INJECTION, SOLUTION INTRAVENOUS at 08:01

## 2018-12-26 RX ADMIN — PHENYLEPHRINE HYDROCHLORIDE 22.61 MICROGRAM(S)/KG/MIN: 10 INJECTION INTRAVENOUS at 08:02

## 2018-12-26 RX ADMIN — Medication 5.43 MICROGRAM(S)/KG/MIN: at 08:01

## 2018-12-26 RX ADMIN — PIPERACILLIN AND TAZOBACTAM 200 GRAM(S): 4; .5 INJECTION, POWDER, LYOPHILIZED, FOR SOLUTION INTRAVENOUS at 18:59

## 2018-12-26 RX ADMIN — PIPERACILLIN AND TAZOBACTAM 200 GRAM(S): 4; .5 INJECTION, POWDER, LYOPHILIZED, FOR SOLUTION INTRAVENOUS at 23:08

## 2018-12-26 NOTE — PROGRESS NOTE ADULT - PROBLEM SELECTOR PROBLEM 1
HOLA (acute kidney injury)
Pneumonia

## 2018-12-26 NOTE — CHART NOTE - NSCHARTNOTEFT_GEN_A_CORE
Admitting Diagnosis:   Patient is a 78y old  Female who presents with a chief complaint of NSTEMI (26 Dec 2018 11:27)      PAST MEDICAL & SURGICAL HISTORY:  Asthma, unspecified asthma severity, unspecified whether complicated, unspecified whether persistent  High cholesterol  Hypertension      Current Nutrition Order:  Nepro via NGT at 20ml/hr x 24hr; infusing at ordered goal rate    GI Issues:   None noted per RN  TF tolerated trophically    Pain:  Unable to assess 2/2 vent, sedation     Skin Integrity:  MSI  Suspected DTI to sacrum and b/l heel   Pt appears jaundiced    Labs:   12-26    135  |  100  |  28<H>  ----------------------------<  164<H>  3.9   |  20<L>  |  0.91    Ca    8.8      26 Dec 2018 09:48  Phos  2.6     12-26  Mg     2.0     12-26    TPro  3.7<L>  /  Alb  2.9<L>  /  TBili  21.6<H>  /  DBili  x   /  AST  901<H>  /  ALT  251<H>  /  AlkPhos  241<H>  12-26    CAPILLARY BLOOD GLUCOSE          Medications:  MEDICATIONS  (STANDING):  ascorbic acid Syrup 500 milliGRAM(s) Oral two times a day  aspirin  chewable 81 milliGRAM(s) Oral daily  calcium gluconate IVPB 1 Gram(s) IV Intermittent every 6 hours  chlorhexidine 0.12% Liquid 15 milliLiter(s) Oral Mucosa two times a day  chlorhexidine 2% Cloths 1 Application(s) Topical <User Schedule>  cisatracurium Infusion 3 MICROgram(s)/kG/Min (10.854 mL/Hr) IV Continuous <Continuous>  CRRT Treatment    <Continuous>  dextrose 50% Injectable 50 milliLiter(s) IV Push every 15 minutes  DOBUTamine Infusion 3 MICROgram(s)/kG/Min (5.427 mL/Hr) IV Continuous <Continuous>  EPINEPHrine    Infusion 0.02 MICROgram(s)/kG/Min (4.522 mL/Hr) IV Continuous <Continuous>  hydrocortisone sodium succinate Injectable 50 milliGRAM(s) IV Push every 12 hours  insulin Infusion 2 Unit(s)/Hr (2 mL/Hr) IV Continuous <Continuous>  micafungin IVPB 100 milliGRAM(s) IV Intermittent every 24 hours  milrinone Infusion 0.125 MICROgram(s)/kG/Min (2.261 mL/Hr) IV Continuous <Continuous>  pantoprazole  Injectable 40 milliGRAM(s) IV Push daily  phenylephrine    Infusion 2 MICROgram(s)/kG/Min (22.613 mL/Hr) IV Continuous <Continuous>  piperacillin/tazobactam IVPB. 3.375 Gram(s) IV Intermittent every 6 hours  propofol Infusion 5 MICROgram(s)/kG/Min (1.809 mL/Hr) IV Continuous <Continuous>  PureFlow Dialysate RFP-400 (K 2 / Ca 3) 5000 milliLiter(s) (1500 mL/Hr) CRRT <Continuous>  sodium chloride 0.9%. 1000 milliLiter(s) (10 mL/Hr) IV Continuous <Continuous>  vancomycin  IVPB 1000 milliGRAM(s) IV Intermittent every 24 hours  vasopressin Infusion 0.01 Unit(s)/Min (0.6 mL/Hr) IV Continuous <Continuous>    MEDICATIONS  (PRN):  ALBUTerol/ipratropium for Nebulization 3 milliLiter(s) Nebulizer every 6 hours PRN Shortness of Breath and/or Wheezing      Weight:  No new wt    Weight Change:   Unable to assess; kindly trend daily wts 2/2 CVVHD    Estimated energy needs:   IBW 59.1kg used for EER and adjusted for post-op/vent/CVVHD  25-30kcal/kg (8270-9092), 1.5-1.8g/kg (88-106g), fluids per team     Subjective:   77y/o Female, s/p emergent MVR for acute MR with cardiogenic shock; h/o NSTEMI. Pt remains intubated and sedated on full vent. Propofol infusing at 5.4ml/hr which provides 142kcal from lipids over 24hrs. CVVHD running. NGT in place with trophic feeds infusing. Current TF tolerated without GI distress. Continue to advance TF to goal rate to meet EER with hemodynamic stability and as appropriate. Per MD note, pt with persistent shock despite max support; pt with poor prognosis. Please keep nutrition aligned with GOC. RD to follow per policy.    Previous Nutrition Diagnosis:  Inadequate energy intake RT no nutrition order placed AEB pt meeting 0% of EER    Active [   ]  Resolved [X]    If resolved, new PES:   Increased nutrient needs RT increased demand AEB post-op     Goal:  Pt to meet >/=75% of EER     Recommendations:  1. Continue Nepro and advance to goal rate as tolerated/medically appropriate to 38ml/hr x 24hr + 1x prostat (912ml TV, 1741kcal, 88g, 663ml water). Additional fluid per MD. Advance by 10ml q4h until goal. Monitor for s/s of intolerance and maintain aspiration precautions.  2. Monitor lytes and replete prn.  3. Trend daily wts.   4. Keep nutrition aligned with GOC.     Education:   N/A-intubated/sedated     Risk Level: High [X] Moderate [   ] Low [   ]

## 2018-12-26 NOTE — CHART NOTE - NSCHARTNOTEFT_GEN_A_CORE
Pt with worsening hypotension despite increasing doses of pressors.  Currently SBP 50s, ABG showing worsening acidosis.  Discussed with Team Dr Zamudio and Dr Guerrero, no addition interventions available.  Pt failing medical therapy.  Health care proxy notified of declining state and that patient is dyinging.  All questions answered.  Plan is for no further escalation of care    Current drips include Epi 0.06 (increased to 0.1 but developed tachycardia). Sukhdeep 3, Vaso @ 0.1units, Dobutamine 2.5mcg/kg/min and pushes of sukhdeep as needed to sustain BP.  Poor prognosis, ongoing shock and multiorgan failure.

## 2018-12-26 NOTE — PROGRESS NOTE ADULT - PROBLEM SELECTOR PLAN 1
Anuric HOLA due to ischemic ATN from cardiogenic shock on CVVHD  Cr 0.9  Strict I/o  Daily weight  Monitor urine output and bladder scan  WIll switch to aquaphoresis with goal fluid balance to be net negative as tolerated in next 24 hours. No clearance required at present.  Goal fluid balance to be net neutral to net negative in next 24 hours duration  Monitor CMP, Mag, phos, Giovani, CBC every 6 hrly

## 2018-12-26 NOTE — PROGRESS NOTE ADULT - SUBJECTIVE AND OBJECTIVE BOX
Patient is a 78y Female seen and evaluated at bedside. Patient remains critically ill on ventilatory support and multiple IV pressors agents at present. Last 24 hour I/o net positive 3.8 L fluid balance on CVVHD.       ALBUTerol/ipratropium for Nebulization 3 every 6 hours PRN  ascorbic acid Syrup 500 two times a day  aspirin  chewable 81 daily  calcium gluconate IVPB 1 every 6 hours  chlorhexidine 0.12% Liquid 15 two times a day  chlorhexidine 2% Cloths 1 <User Schedule>  cisatracurium Infusion 3 <Continuous>  CRRT Treatment  <Continuous>  dextrose 50% Injectable 50 every 15 minutes  DOBUTamine Infusion 3 <Continuous>  EPINEPHrine    Infusion 0.02 <Continuous>  hydrocortisone sodium succinate Injectable 50 every 12 hours  insulin Infusion 2 <Continuous>  micafungin IVPB 100 every 24 hours  milrinone Infusion 0.125 <Continuous>  pantoprazole  Injectable 40 daily  phenylephrine    Infusion 2 <Continuous>  piperacillin/tazobactam IVPB. 3.375 every 6 hours  propofol Infusion 5 <Continuous>  PureFlow Dialysate RFP-400 (K 2 / Ca 3) 5000 <Continuous>  sodium chloride 0.9%. 1000 <Continuous>  vancomycin  IVPB 1000 every 24 hours  vasopressin Infusion 0.01 <Continuous>      Allergies    No Known Allergies    Intolerances        T(C): , Max: 36.3 (12-25-18 @ 14:00)  T(F): , Max: 97.3 (12-25-18 @ 14:00)  HR: 114 (12-26-18 @ 13:19)  BP: --  BP(mean): --  RR: 12 (12-26-18 @ 12:00)  SpO2: 99% (12-26-18 @ 13:19)  Wt(kg): --    12-25 @ 07:01  -  12-26 @ 07:00  --------------------------------------------------------  IN: 5252.6 mL / OUT: 1371 mL / NET: 3881.6 mL    12-26 @ 07:01  -  12-26 @ 13:36  --------------------------------------------------------  IN: 499.8 mL / OUT: 393 mL / NET: 106.8 mL          Review of Systems:  Limited. Sedated and intubated.    PHYSICAL EXAM:  GENERAL: Ill appearing, lying in bed sedated and intubated in no acute distress at present  HEAD:  Atraumatic, Normocephalic,   EYES: Bilateral conjuctival and scleral pallor   Oral cavity: Oral mucosa dry and pale  NECK: Neck supple, No JVD, ETT tube present  CHEST/LUNG: Bilateral clear breath sounds, bibasilar minimal rhonchi and crepitation, no wheezing  HEART: Regular rate and rhythm. HIREN II/VI at LPSB, No gallops, no rub, surgical scar present.   ABDOMEN: Soft, Nontender, non distended, bowel sound present, No flank tenderness.   EXTREMITIES: Bilateral thigh edema+nt, No clubbing, cyanosis  Neurology: AAOx0 , sedated and intuabted  SKIN: No rashes or lesions    ACCESS: Right IJ HD catheter    LABS:                        11.0   29.0  )-----------( 62       ( 26 Dec 2018 13:16 )             31.8     12-26    135  |  100  |  28<H>  ----------------------------<  164<H>  3.9   |  20<L>  |  0.91    Ca    8.8      26 Dec 2018 09:48  Phos  2.6     12-26  Mg     2.0     12-26    TPro  3.7<L>  /  Alb  2.9<L>  /  TBili  21.6<H>  /  DBili  x   /  AST  901<H>  /  ALT  251<H>  /  AlkPhos  241<H>  12-26      PT/INR - ( 26 Dec 2018 13:16 )   PT: 22.3 sec;   INR: 1.93          PTT - ( 26 Dec 2018 13:16 )  PTT:43.4 sec          RADIOLOGY & ADDITIONAL STUDIES:  < from: Xray Chest 1 View-PORTABLE IMMEDIATE (12.25.18 @ 16:30) >    ******PRELIMINARY REPORT******    ******PRELIMINARY REPORT******            EXAM:  XR CHEST PORTABLE IMMED 1V                          PROCEDURE DATE:  12/25/2018    ******PRELIMINARY REPORT******    ******PRELIMINARY REPORT******              INTERPRETATION:    XR CHEST PORTABLE IMMED 1V dated 12/25/2018 4:30 PM    HISTORY: Chest tube removal    COMPARISON: Chest radiograph same date    FINDINGS: Interval removal of mediastinal and right pleural surgical   drainage catheters. Left pleural pigtail catheter unchanged. Interval   resolution of mild asymmetric lucency left lung field. Otherwise,   unchanged.      IMPRESSION:   1.  Interval removal of surgical drainage catheters.  2.  Interval resolution of mild asymmetric lucency of the left lung   field. Correlate with details of clamping trial.            "Thank you for the opportunity to participate in the care of this   patient."  ******PRELIMINARY REPORT******    ******PRELIMINARY REPORT******          PHOEBE CHANG M.D., RADIOLOGYRESIDENT                        < end of copied text >

## 2018-12-26 NOTE — PROGRESS NOTE ADULT - SUBJECTIVE AND OBJECTIVE BOX
77yo admitted to [facility] for management of NSTEMI on 12/19/2018.  Pt with complicated presentation.  Per notes and family she was admitted to [facility] for workup of hemoptysis where she was diagnosed with invasive aspergillosis  Per Report, pt was started on Amphotericin which was complicated by HOLA, while hospitalized there she was noted to have NSTEMI and transferred to [facility] for Cardiac evaluation.  On her first night  of admission, pt noted to be in resp distress requiring intubation and subsequently developed shock.  TTE on 12/20 showed Flail posterior leaflet of mitral valve consistent with ruptured chordea.  [facility] non-occlussive CAD, Pt taken emergently to OR for MVR on 12/20    Post op course complicated by MOF - shock liver, renal failure, resp failure  cardiogenic shock requiring IABP, Iontropic support.  There is also concern for neurologic event pt has right sided weakness.   Pt also noted to have elevated methemoglobinemia related to Anupam use immediate post op.  s/p treatment with methylene blue on 12/22    Pt has been in persisent shock since the OR.   Current active issues include  1. Encephalopathy   2. cardiogenic shock requiring Iontropes IABP, increasing pressor support   3. HOLA now CVVH   4. Shock liver with improving AST/ALT, hyperbilirubinemia  5. Likely Rhabdo - elevated CPK 10K        PMH :  NSTEMI  Asthma, unspecified asthma severity, unspecified whether complicated, unspecified whether persistent  High cholesterol  Hypertension  Mitral regurgitation  Papillary muscle rupture  Cardiogenic shock  HOLA (acute kidney injury)  Hemoptysis  Pneumonia  Mitral valve replacement    ICU Vital Signs Last 24 Hrs  T(C): 36.3 (12-25-18 @ 14:00), Max: 36.4 (12-25-18 @ 10:00)  T(F): 97.3 (12-25-18 @ 14:00), Max: 97.5 (12-25-18 @ 10:00)  HR: 92 (12-25-18 @ 16:58) (76 - 106)  ABP: 94/52 (12-25-18 @ 16:00) (80/42 - 116/66)  ABP(mean): 68 (12-25-18 @ 16:00) (56 - 84)  RR: 18 (12-25-18 @ 16:00) (5 - 24)  SpO2: 96% (12-25-18 @ 16:58) (92% - 98%)    I&O's Summary    24 Dec 2018 07:01  -  25 Dec 2018 07:00  --------------------------------------------------------  IN: 4991.2 mL / OUT: 2715 mL / NET: 2276.2 mL    25 Dec 2018 07:01  -  25 Dec 2018 17:36  --------------------------------------------------------  IN: 1669.6 mL / OUT: 338 mL / NET: 1331.6 mL      Mode: AC/ CMV (Assist Control/ Continuous Mandatory Ventilation)  RR (machine): 12  TV (machine): 450  FiO2: 70  PEEP: 5  ITime: 0.1  MAP: 8  PIP: 22    ABG - ( 25 Dec 2018 16:08 )  pH: 7.43  /  pCO2: 35    /  pO2: 83    / HCO3: 23    / Base Excess: -1.2  /  SaO2: 96                              10.6   23.6  )-----------( 139      ( 25 Dec 2018 16:14 )             29.9     25 Dec 2018 16:14    135    |  96     |  26     ----------------------------<  120    3.8     |  21     |  0.92     Ca    9.2        25 Dec 2018 16:14  Phos  2.2       25 Dec 2018 16:14  Mg     1.8       25 Dec 2018 16:14    TPro  4.0    /  Alb  2.8    /  TBili  23.0   /  DBili  x      /  AST  2293   /  ALT  392    /  AlkPhos  235    25 Dec 2018 16:14    PT/INR - ( 25 Dec 2018 16:14 )   PT: 18.0 sec;   INR: 1.57     PTT - ( 25 Dec 2018 16:14 )  PTT:33.7 sec    MEDICATIONS  (STANDING):  ascorbic acid Syrup 500 milliGRAM(s) Oral two times a day  aspirin  chewable 81 milliGRAM(s) Oral daily  DOBUTamine Infusion 3 MICROgram(s)/kG/Min IV Continuous <Continuous>  hydrocortisone sodium succinate Injectable 75 milliGRAM(s) IV Push every 8 hours  insulin Infusion 2 Unit(s)/Hr IV Continuous <Continuous>  micafungin IVPB 100 milliGRAM(s) IV Intermittent every 24 hours  milrinone Infusion 0.125 MICROgram(s)/kG/Min IV Continuous <Continuous>  pantoprazole Infusion 8 mG/Hr IV Continuous <Continuous>  phenylephrine    Infusion 0.2 MICROgram(s)/kG/Min IV Continuous <Continuous>  piperacillin/tazobactam IVPB. 3.375 Gram(s) IV Intermittent every 6 hours  potassium phosphate IVPB 30 milliMole(s) IV Intermittent once  vancomycin  IVPB 1000 milliGRAM(s) IV Intermittent every 24 hours  vasopressin Infusion 0.01 Unit(s)/Min IV Continuous <Continuous>    Home Medications:  alendronate 70 mg oral tablet: 1 tab(s) orally once a week (19 Dec 2018 16:40)  flecainide 100 mg oral tablet: 1 tab(s) orally every 12 hours (19 Dec 2018 16:40)  fluticasone-vilanterol 200 mcg-25 mcg/inh inhalation powder: 1 puff(s) inhaled once a day (19 Dec 2018 16:40)  losartan 50 mg oral tablet: 1 tab(s) orally once a day (19 Dec 2018 16:40)  montelukast 10 mg oral tablet: 1 tab(s) orally once a day (19 Dec 2018 16:40)  nebivolol 5 mg oral tablet: 1 tab(s) orally once a day (19 Dec 2018 16:40)  ondansetron 4 mg oral tablet: 1 tab(s) orally every 8 hours (19 Dec 2018 16:40)  pantoprazole 40 mg oral delayed release tablet: 1 tab(s) orally once a day (19 Dec 2018 16:40)  simvastatin 20 mg oral tablet: 1 tab(s) orally once a day (at bedtime) (19 Dec 2018 16:40)  voriconazole 200 mg oral tablet: 1 tab(s) orally every 12 hours (19 Dec 2018 16:40)    PHYSICAL EXAM:  Gen : intubated, ill appearing, Jaundice and cyanosis noted   Neck: No LAD, No JVD  Respiratory: decreased in the bases  Cardiovascular: S1 and S2, irregular   Gastrointestinal: BS+, soft, NT/ND  Extremities: 3+ pitting edema   Vascular: 2+ peripheral pulses  Neurological: moves left side spontaneously   Skin : + cynosis    MSK : no weakness, normal gait   Incision: clean dry/ no sign of infection  Lines: no sign of infection 79yo admitted to [facility] for management of NSTEMI on 12/19/2018.  Pt with complicated presentation.  Per notes and family she was admitted to [facility] for workup of hemoptysis where she was diagnosed with invasive aspergillosis  Per Report, pt was started on Amphotericin which was complicated by HOLA, while hospitalized there she was noted to have NSTEMI and transferred to [facility] for Cardiac evaluation.  On her first night  of admission, pt noted to be in resp distress requiring intubation and subsequently developed shock.  TTE on 12/20 showed Flail posterior leaflet of mitral valve consistent with ruptured chordea.  [facility] non-occlussive CAD, Pt taken emergently to OR for MVR on 12/20    Post op course complicated by MOF - shock liver, renal failure, resp failure  cardiogenic shock requiring IABP, Iontropic support.  There is also concern for neurologic event pt has right sided weakness.   Pt also noted to have elevated methemoglobinemia related to Anupam use immediate post op.  s/p treatment with methylene blue on 12/22    Pt has been in persisent shock since the OR.   Current active issues include  1. Encephalopathy   2. Cardiogenic shock requiring Iontropes, increasing pressor support   3. HOLA now CVVH   4. Shock liver with improving AST/ALT, hyperbilirubinemia  5. Likely Rhabdo - elevated CPK 10K      PHYSICAL EXAM:  Gen : intubated, ill appearing, Jaundice and cyanosis noted   Neck: No LAD, No JVD  Respiratory: decreased in the bases  Cardiovascular: S1 and S2, irregular   Gastrointestinal: BS+, soft, NT/ND  Extremities: 3+ pitting edema   Vascular: 2+ peripheral pulses  Neurological: moves left side spontaneously   Skin : + cynosis    MSK : no weakness, normal gait   Incision: clean dry/ no sign of infection  Lines: no sign of infection

## 2018-12-26 NOTE — PROGRESS NOTE ADULT - PROBLEM SELECTOR PROBLEM 2
Cardiogenic shock
Hemoptysis

## 2018-12-26 NOTE — PROGRESS NOTE ADULT - ATTENDING COMMENTS
I have reviewed the medical record, including laboratory and radiographic studies, interviewed and examined the patient and discussed the plan with Dr. Singh, the ID Resident.  Agree with above. Please recall if further ID input is desired – ID service.
I have reviewed the medical record, including laboratory and radiographic studies, examined the patient and discussed the plan with Lyle Lauren & Francisco, the ID Residents.  Agree with above. Will continue to follow with you – ID service.
I have reviewed the medical record, including laboratory and radiographic studies, interviewed and examined the patient and discussed the plan with Dr. Lauren, the ID Resident, and Dr. Saucedo.  Agree with above. She had hemoptysis with CT findings c/c small nodular necrotizing pneumonia.  She has no identifiable risk factors for invasive aspergillosis but report of fine needle aspirate is highly concerning.  Would treat for HCAP with vancomycin/pip-tazo, renally dosed, obtain pathology reports.  If confirmed, would start voriconaze with careful monitoring for drug interactions/intolerance.  Will follow with you – ID service.
I have reviewed the medical record, including laboratory and radiographic studies, examined the patient and discussed the plan with Dr. Singh, the ID Resident.  Agree with above. Will continue to follow with you – ID service.
Patient seen and examined with PA.  Agree with above.  Patient with multiple medical problems including post cardiac surgery.  She's not moving right arm - possibly stroke.  Discussed case with Dr. Silva, CTICU Attending.  Agree with utility of CT Head without contrast for diagnosis and prognosis.  Will attempt to assist.  She's already on Aspirin 81mg for antiplatelet.  No statin due to elevated liver enzymes  DVT prophylaxis - SCDs in place.
78-year-old woman now recovered for mitral valve replacement with severe cardiac dysfunction on intra-aortic balloon pump, pressors, and renal replacement therapy. Continue therapy for invasive aspergillus as shown on transbronchial biopsy.
Case reviewed in am.  Agree with note above.
Case reviewed.  Agree with fellow's note above.  We will continue to follow with you.
Case reviewed. Agree with Dr. Castillo's note above.  Continue CVVHD.  UF limited by hypotension.
Patient seen at bedside with renal fellow during rounds.   Agree with note above.   Will continue to follow with you.

## 2018-12-26 NOTE — PROGRESS NOTE ADULT - PROBLEM SELECTOR PLAN 3
- aspergillosis - on micafungin   -on Zosyn and vancomycin per renal dose clearance
- aspergillosis - on micafungin   -on Zosyn 3.375 gr every 6 hours

## 2018-12-26 NOTE — CHART NOTE - NSCHARTNOTEFT_GEN_A_CORE
Pt with escalating pressor requirement.  Increasing lactic acid, agonal breathing.  Health care proxy notified of ongoing decline.  Case discussed with Dr Guerrero, no further surgical interventions are available plan is for ongoing medical therapy.  Will continue to support with pressors, iontropes.  If pt is to develop fatal arrhythmia or cardiac arrest, I have discussed with proxy Angel Reynolds Chest compressions and shocks would be futile and not helpful.  He agrees, in case of cardiac arrest no shock or compression.  Plan is to continue current support as is.

## 2018-12-26 NOTE — PROGRESS NOTE ADULT - SUBJECTIVE AND OBJECTIVE BOX
Neurology Stroke Progress Note    INTERVAL HPI/OVERNIGHT EVENTS:  Patient seen and examined.     MEDICATIONS  (STANDING):  ascorbic acid Syrup 500 milliGRAM(s) Oral two times a day  aspirin  chewable 81 milliGRAM(s) Oral daily  calcium gluconate IVPB 1 Gram(s) IV Intermittent every 6 hours  chlorhexidine 0.12% Liquid 15 milliLiter(s) Oral Mucosa two times a day  chlorhexidine 2% Cloths 1 Application(s) Topical <User Schedule>  CRRT Treatment    <Continuous>  dextrose 50% Injectable 50 milliLiter(s) IV Push every 15 minutes  DOBUTamine Infusion 3 MICROgram(s)/kG/Min (5.427 mL/Hr) IV Continuous <Continuous>  EPINEPHrine    Infusion 0.02 MICROgram(s)/kG/Min (4.522 mL/Hr) IV Continuous <Continuous>  hydrocortisone sodium succinate Injectable 50 milliGRAM(s) IV Push every 12 hours  insulin Infusion 2 Unit(s)/Hr (2 mL/Hr) IV Continuous <Continuous>  micafungin IVPB 100 milliGRAM(s) IV Intermittent every 24 hours  milrinone Infusion 0.125 MICROgram(s)/kG/Min (2.261 mL/Hr) IV Continuous <Continuous>  pantoprazole  Injectable 40 milliGRAM(s) IV Push daily  phenylephrine    Infusion 2 MICROgram(s)/kG/Min (22.613 mL/Hr) IV Continuous <Continuous>  piperacillin/tazobactam IVPB. 3.375 Gram(s) IV Intermittent every 6 hours  propofol Infusion 5 MICROgram(s)/kG/Min (1.809 mL/Hr) IV Continuous <Continuous>  PureFlow Dialysate RFP-400 (K 2 / Ca 3) 5000 milliLiter(s) (1500 mL/Hr) CRRT <Continuous>  sodium chloride 0.9%. 1000 milliLiter(s) (10 mL/Hr) IV Continuous <Continuous>  vancomycin  IVPB 1000 milliGRAM(s) IV Intermittent every 24 hours  vasopressin Infusion 0.01 Unit(s)/Min (0.6 mL/Hr) IV Continuous <Continuous>    MEDICATIONS  (PRN):  ALBUTerol/ipratropium for Nebulization 3 milliLiter(s) Nebulizer every 6 hours PRN Shortness of Breath and/or Wheezing      Allergies    No Known Allergies    Intolerances        ROS: As per HPI, otherwise negative    Vital Signs Last 24 Hrs  T(C): 35.1 (26 Dec 2018 10:00), Max: 36.3 (25 Dec 2018 14:00)  T(F): 95.2 (26 Dec 2018 10:00), Max: 97.3 (25 Dec 2018 14:00)  HR: 114 (26 Dec 2018 11:00) (82 - 114)  BP: --  BP(mean): --  RR: 18 (26 Dec 2018 11:00) (5 - 27)  SpO2: 95% (26 Dec 2018 11:00) (79% - 100%)    Physical exam:  General: awake and alert, sitting comfortably, no acute distress  CV: RRR, no murmurs  Pulm: CTA bilaterally  Neurologic:  Mental status: awake, alert, oriented to person, place, and time. Speech is fluent - able to name, repeat, and describe picture fully. Follows commands. Attention/concentration intact. No dysarthria, no aphasia.  Cranial nerves:   II: visual fields are full to confrontation. pupils equally round and reactive to light,   III, IV, VI: EOMI without nystagmus  V:  V1-V3 sensation intact,   VII: no facial droop, facie is symmetric with normal eye closure and smile  VIII: hearing is intact to finger rub  IX, X: Uvula is midline and soft palate rises symmetrically  XI: head turning and shoulder shrug are intact.  XII: tongue midline  Motor: Normal bulk and tone, strength 5/5 in b/l UE and LE,  strength 5/5. No pronator drift  Sensation: intact to light touch and pinprick. No neglect.  Coordination: No dysmetria on finger-to-nose and heel-to-shin  Reflexes: 2+ in upper and lower extremities, downgoing toes bilaterally  Gait: narrow and steady, no ataxia. Romberg negative        LABS:                        10.8   26.2  )-----------( 71       ( 26 Dec 2018 09:50 )             31.0     12-26    135  |  100  |  28<H>  ----------------------------<  164<H>  3.9   |  20<L>  |  0.91    Ca    8.8      26 Dec 2018 09:48  Phos  2.6     12-26  Mg     2.0     12-26    TPro  3.7<L>  /  Alb  2.9<L>  /  TBili  21.6<H>  /  DBili  x   /  AST  901<H>  /  ALT  251<H>  /  AlkPhos  241<H>  12-26    PT/INR - ( 26 Dec 2018 09:51 )   PT: 22.7 sec;   INR: 1.97          PTT - ( 26 Dec 2018 09:51 )  PTT:41.7 sec      RADIOLOGY & ADDITIONAL TESTS:      Assessment and Plan  78y yo Female    77 yo F admitted for NSTEMI, s/p emergent MV repair on 12/20, now w/ multiorgan failure from cardiogenic shock. Stroke consulted for JUAN degroot, unable to obtain CT as pt unstable for transfer from room. Under CTICU    1)Secondary stroke prevention  -ASA  -Hold statin as pt with elevated LFTs and jaundice    2) Further workup   -Ideally would get CT, though exam consistent with stroke.  -If possible to have her own room for time of CT, can try to obtain portable CT in the morning hours.     DVT prophylaxis   -Heparin SQ TID and SCDs Neurology Stroke Progress Note    INTERVAL HPI/OVERNIGHT EVENTS:  Patient seen and examined. Pt intubated and sedated, no response or movement.     MEDICATIONS  (STANDING):  ascorbic acid Syrup 500 milliGRAM(s) Oral two times a day  aspirin  chewable 81 milliGRAM(s) Oral daily  calcium gluconate IVPB 1 Gram(s) IV Intermittent every 6 hours  chlorhexidine 0.12% Liquid 15 milliLiter(s) Oral Mucosa two times a day  chlorhexidine 2% Cloths 1 Application(s) Topical <User Schedule>  CRRT Treatment    <Continuous>  dextrose 50% Injectable 50 milliLiter(s) IV Push every 15 minutes  DOBUTamine Infusion 3 MICROgram(s)/kG/Min (5.427 mL/Hr) IV Continuous <Continuous>  EPINEPHrine    Infusion 0.02 MICROgram(s)/kG/Min (4.522 mL/Hr) IV Continuous <Continuous>  hydrocortisone sodium succinate Injectable 50 milliGRAM(s) IV Push every 12 hours  insulin Infusion 2 Unit(s)/Hr (2 mL/Hr) IV Continuous <Continuous>  micafungin IVPB 100 milliGRAM(s) IV Intermittent every 24 hours  milrinone Infusion 0.125 MICROgram(s)/kG/Min (2.261 mL/Hr) IV Continuous <Continuous>  pantoprazole  Injectable 40 milliGRAM(s) IV Push daily  phenylephrine    Infusion 2 MICROgram(s)/kG/Min (22.613 mL/Hr) IV Continuous <Continuous>  piperacillin/tazobactam IVPB. 3.375 Gram(s) IV Intermittent every 6 hours  propofol Infusion 5 MICROgram(s)/kG/Min (1.809 mL/Hr) IV Continuous <Continuous>  PureFlow Dialysate RFP-400 (K 2 / Ca 3) 5000 milliLiter(s) (1500 mL/Hr) CRRT <Continuous>  sodium chloride 0.9%. 1000 milliLiter(s) (10 mL/Hr) IV Continuous <Continuous>  vancomycin  IVPB 1000 milliGRAM(s) IV Intermittent every 24 hours  vasopressin Infusion 0.01 Unit(s)/Min (0.6 mL/Hr) IV Continuous <Continuous>    MEDICATIONS  (PRN):  ALBUTerol/ipratropium for Nebulization 3 milliLiter(s) Nebulizer every 6 hours PRN Shortness of Breath and/or Wheezing      Allergies    No Known Allergies        ROS: As per HPI, otherwise negative    Vital Signs Last 24 Hrs  T(C): 35.1 (26 Dec 2018 10:00), Max: 36.3 (25 Dec 2018 14:00)  T(F): 95.2 (26 Dec 2018 10:00), Max: 97.3 (25 Dec 2018 14:00)  HR: 114 (26 Dec 2018 11:00) (82 - 114)  BP: --  BP(mean): --  RR: 18 (26 Dec 2018 11:00) (5 - 27)  SpO2: 95% (26 Dec 2018 11:00) (79% - 100%)    Physical exam:  General: intubated, sedated. Jaundiced  CV: RRR, no murmurs  Pulm: CTA bilaterally  Neurologic:  Mental status: intubated, sedated, no movement or reaction to any stimuli  Brainstem: corneal reflexes intact, gag reflex intact  Cranial nerves:   II: pupils equally round and minimally reactive to light,   III, IV, VI: eyes midline  Motor: Swollen RUE. No movement of any extremity to any stimuli.   Sensation: no movement to painful stimuli  Coordination: deferred  Reflexes: upgoing toes bilaterally  Gait: deferred        LABS:                        10.8   26.2  )-----------( 71       ( 26 Dec 2018 09:50 )             31.0     12-26    135  |  100  |  28<H>  ----------------------------<  164<H>  3.9   |  20<L>  |  0.91    Ca    8.8      26 Dec 2018 09:48  Phos  2.6     12-26  Mg     2.0     12-26    TPro  3.7<L>  /  Alb  2.9<L>  /  TBili  21.6<H>  /  DBili  x   /  AST  901<H>  /  ALT  251<H>  /  AlkPhos  241<H>  12-26    PT/INR - ( 26 Dec 2018 09:51 )   PT: 22.7 sec;   INR: 1.97          PTT - ( 26 Dec 2018 09:51 )  PTT:41.7 sec      RADIOLOGY & ADDITIONAL TESTS:      Assessment and Plan  77 yo F admitted for NSTEMI, s/p emergent MV repair on 12/20, now w/ multiorgan failure from cardiogenic shock. Stroke consulted for JUAN degroot, unable to obtain CT as pt unstable for transfer from room. Concern for L MCA vs brainstem infarct    1)Secondary stroke prevention  -ASA  -Hold statin as pt with elevated LFTs and jaundice    2) Further workup   -If possible to have her own room for time of CT, can try to obtain portable CT in the morning hours.     DVT prophylaxis   -SCDs Neurology Stroke Progress Note    INTERVAL HPI/OVERNIGHT EVENTS:  Patient seen and examined. Pt intubated and sedated, no response or movement.     MEDICATIONS  (STANDING):  ascorbic acid Syrup 500 milliGRAM(s) Oral two times a day  aspirin  chewable 81 milliGRAM(s) Oral daily  calcium gluconate IVPB 1 Gram(s) IV Intermittent every 6 hours  chlorhexidine 0.12% Liquid 15 milliLiter(s) Oral Mucosa two times a day  chlorhexidine 2% Cloths 1 Application(s) Topical <User Schedule>  CRRT Treatment    <Continuous>  dextrose 50% Injectable 50 milliLiter(s) IV Push every 15 minutes  DOBUTamine Infusion 3 MICROgram(s)/kG/Min (5.427 mL/Hr) IV Continuous <Continuous>  EPINEPHrine    Infusion 0.02 MICROgram(s)/kG/Min (4.522 mL/Hr) IV Continuous <Continuous>  hydrocortisone sodium succinate Injectable 50 milliGRAM(s) IV Push every 12 hours  insulin Infusion 2 Unit(s)/Hr (2 mL/Hr) IV Continuous <Continuous>  micafungin IVPB 100 milliGRAM(s) IV Intermittent every 24 hours  milrinone Infusion 0.125 MICROgram(s)/kG/Min (2.261 mL/Hr) IV Continuous <Continuous>  pantoprazole  Injectable 40 milliGRAM(s) IV Push daily  phenylephrine    Infusion 2 MICROgram(s)/kG/Min (22.613 mL/Hr) IV Continuous <Continuous>  piperacillin/tazobactam IVPB. 3.375 Gram(s) IV Intermittent every 6 hours  propofol Infusion 5 MICROgram(s)/kG/Min (1.809 mL/Hr) IV Continuous <Continuous>  PureFlow Dialysate RFP-400 (K 2 / Ca 3) 5000 milliLiter(s) (1500 mL/Hr) CRRT <Continuous>  sodium chloride 0.9%. 1000 milliLiter(s) (10 mL/Hr) IV Continuous <Continuous>  vancomycin  IVPB 1000 milliGRAM(s) IV Intermittent every 24 hours  vasopressin Infusion 0.01 Unit(s)/Min (0.6 mL/Hr) IV Continuous <Continuous>    MEDICATIONS  (PRN):  ALBUTerol/ipratropium for Nebulization 3 milliLiter(s) Nebulizer every 6 hours PRN Shortness of Breath and/or Wheezing      Allergies    No Known Allergies        ROS: As per HPI, otherwise negative    Vital Signs Last 24 Hrs  T(C): 35.1 (26 Dec 2018 10:00), Max: 36.3 (25 Dec 2018 14:00)  T(F): 95.2 (26 Dec 2018 10:00), Max: 97.3 (25 Dec 2018 14:00)  HR: 114 (26 Dec 2018 11:00) (82 - 114)  BP: --  BP(mean): --  RR: 18 (26 Dec 2018 11:00) (5 - 27)  SpO2: 95% (26 Dec 2018 11:00) (79% - 100%)    Physical exam:  General: intubated, sedated. Jaundiced  CV: RRR, no murmurs  Pulm: CTA bilaterally  Neurologic:  Mental status: intubated, sedated, no movement or reaction to any stimuli  Brainstem: corneal reflexes intact, gag reflex intact  Cranial nerves:   II: pupils equally round and minimally reactive to light,   III, IV, VI: eyes midline  Motor: Swollen RUE. No movement of any extremity to any stimuli.   Sensation: no movement to painful stimuli  Coordination: deferred  Reflexes: upgoing toes bilaterally  Gait: deferred        LABS:                        10.8   26.2  )-----------( 71       ( 26 Dec 2018 09:50 )             31.0     12-26    135  |  100  |  28<H>  ----------------------------<  164<H>  3.9   |  20<L>  |  0.91    Ca    8.8      26 Dec 2018 09:48  Phos  2.6     12-26  Mg     2.0     12-26    TPro  3.7<L>  /  Alb  2.9<L>  /  TBili  21.6<H>  /  DBili  x   /  AST  901<H>  /  ALT  251<H>  /  AlkPhos  241<H>  12-26    PT/INR - ( 26 Dec 2018 09:51 )   PT: 22.7 sec;   INR: 1.97          PTT - ( 26 Dec 2018 09:51 )  PTT:41.7 sec      RADIOLOGY & ADDITIONAL TESTS:      Assessment and Plan  79 yo F admitted for NSTEMI, s/p emergent MV repair on 12/20, now w/ multiorgan failure from cardiogenic shock. Stroke consulted for JUAN degroot, unable to obtain CT as pt unstable for transfer from room. Concern for L MCA vs brainstem infarct.  Initial NIHSS today is 32    1)Secondary stroke prevention  -ASA  -Hold statin as pt with elevated LFTs and jaundice    2) Further workup   -If possible to have her own room for time of CT, can try to obtain portable CT in the morning hours.     DVT prophylaxis   -SCDs Neurology Stroke Progress Note    INTERVAL HPI/OVERNIGHT EVENTS:  Patient seen and examined. Pt intubated and sedated, no response or movement.     MEDICATIONS  (STANDING):  ascorbic acid Syrup 500 milliGRAM(s) Oral two times a day  aspirin  chewable 81 milliGRAM(s) Oral daily  calcium gluconate IVPB 1 Gram(s) IV Intermittent every 6 hours  chlorhexidine 0.12% Liquid 15 milliLiter(s) Oral Mucosa two times a day  chlorhexidine 2% Cloths 1 Application(s) Topical <User Schedule>  CRRT Treatment    <Continuous>  dextrose 50% Injectable 50 milliLiter(s) IV Push every 15 minutes  DOBUTamine Infusion 3 MICROgram(s)/kG/Min (5.427 mL/Hr) IV Continuous <Continuous>  EPINEPHrine    Infusion 0.02 MICROgram(s)/kG/Min (4.522 mL/Hr) IV Continuous <Continuous>  hydrocortisone sodium succinate Injectable 50 milliGRAM(s) IV Push every 12 hours  insulin Infusion 2 Unit(s)/Hr (2 mL/Hr) IV Continuous <Continuous>  micafungin IVPB 100 milliGRAM(s) IV Intermittent every 24 hours  milrinone Infusion 0.125 MICROgram(s)/kG/Min (2.261 mL/Hr) IV Continuous <Continuous>  pantoprazole  Injectable 40 milliGRAM(s) IV Push daily  phenylephrine    Infusion 2 MICROgram(s)/kG/Min (22.613 mL/Hr) IV Continuous <Continuous>  piperacillin/tazobactam IVPB. 3.375 Gram(s) IV Intermittent every 6 hours  propofol Infusion 5 MICROgram(s)/kG/Min (1.809 mL/Hr) IV Continuous <Continuous>  PureFlow Dialysate RFP-400 (K 2 / Ca 3) 5000 milliLiter(s) (1500 mL/Hr) CRRT <Continuous>  sodium chloride 0.9%. 1000 milliLiter(s) (10 mL/Hr) IV Continuous <Continuous>  vancomycin  IVPB 1000 milliGRAM(s) IV Intermittent every 24 hours  vasopressin Infusion 0.01 Unit(s)/Min (0.6 mL/Hr) IV Continuous <Continuous>    MEDICATIONS  (PRN):  ALBUTerol/ipratropium for Nebulization 3 milliLiter(s) Nebulizer every 6 hours PRN Shortness of Breath and/or Wheezing      Allergies    No Known Allergies        ROS: As per HPI, otherwise negative    Vital Signs Last 24 Hrs  T(C): 35.1 (26 Dec 2018 10:00), Max: 36.3 (25 Dec 2018 14:00)  T(F): 95.2 (26 Dec 2018 10:00), Max: 97.3 (25 Dec 2018 14:00)  HR: 114 (26 Dec 2018 11:00) (82 - 114)  BP: --  BP(mean): --  RR: 18 (26 Dec 2018 11:00) (5 - 27)  SpO2: 95% (26 Dec 2018 11:00) (79% - 100%)    Physical exam:  General: intubated, sedated. Jaundiced  CV: RRR, no murmurs  Pulm: CTA bilaterally  Neurologic:  Mental status: intubated, sedated, no movement or reaction to any stimuli  Brainstem: corneal reflexes intact, gag reflex intact  Cranial nerves:   II: pupils equally round and minimally reactive to light,   III, IV, VI: eyes midline  Motor: Swollen RUE. No movement of any extremity to any stimuli.   Sensation: no movement to painful stimuli  Coordination: deferred  Reflexes: upgoing toes bilaterally  Gait: deferred        LABS:                        10.8   26.2  )-----------( 71       ( 26 Dec 2018 09:50 )             31.0     12-26    135  |  100  |  28<H>  ----------------------------<  164<H>  3.9   |  20<L>  |  0.91    Ca    8.8      26 Dec 2018 09:48  Phos  2.6     12-26  Mg     2.0     12-26    TPro  3.7<L>  /  Alb  2.9<L>  /  TBili  21.6<H>  /  DBili  x   /  AST  901<H>  /  ALT  251<H>  /  AlkPhos  241<H>  12-26    PT/INR - ( 26 Dec 2018 09:51 )   PT: 22.7 sec;   INR: 1.97     PTT - ( 26 Dec 2018 09:51 )  PTT:41.7 sec      RADIOLOGY & ADDITIONAL TESTS:      Assessment and Plan  77 yo F admitted for NSTEMI, s/p emergent MV repair on 12/20, now w/ multiorgan failure from cardiogenic shock. Stroke consulted for JUAN degroot, unable to obtain CT as pt unstable for transfer from room. Concern for L MCA vs brainstem infarct.  Initial NIHSS today is 32    1)Secondary stroke prevention  -ASA  -Hold statin as pt with elevated LFTs and jaundice    2) Further workup   -If possible to have her own room for time of CT, can try to obtain portable CT in the morning hours.     DVT prophylaxis   -SCDs

## 2018-12-26 NOTE — PROGRESS NOTE ADULT - PROBLEM SELECTOR PLAN 2
- on pressors and inotropes   - treatment as per primary team
- on pressors and inotropes   - treatement as per primary team
- on pressors and inotropes   - treatment as per primary team
-Has not recurred in over a week however at this point the patient is higher risk for hemoptysis given ASA, Plavix, and Heparin Gtt in the setting of NSTEMI.   -Would recommend aggressive cough suppression if patient were to be extubated.   -If any hemoptysis should occur it should be collected so that is may be quantified and monitored.   -Massive hemoptysis would be >50-100cc at any given time or >200cc in 24 hours. If this should occur please call Pulmonary fellow, as well was stat IR consult for embolization.   -CT surgery also on board and aware of patient and her potential risk of bleeding issues.

## 2018-12-26 NOTE — PROGRESS NOTE ADULT - ASSESSMENT
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  Pt with persistent shock state despite maximal support    plan   Neuro -- concern for CVA will try to arrange for portable Head CT.   CVS - continues to requiring Inotropic and pressor support  pt with increasing pressor support overnight  Pulm - vent support   GI - GI proph, tube feeds    - CVVH ongoing but unable to remove fluid due to hypotension   Endo - glycemic control  Heme - leukocytosis, s/p platelet transfusion overnight   ID - on empiric zosyn, vanc for concern of SIRS/SEPSIS in the setting of shock  On Micafungin  for recent history of invasive aspergillosis.   Prognosis is poor.   Health care proxy updated.     Critical post op.    Critical care time spent 60min

## 2018-12-27 VITALS — OXYGEN SATURATION: 100 % | HEART RATE: 18 BPM

## 2018-12-27 LAB
GAS PNL BLDA: SIGNIFICANT CHANGE UP
LACTATE SERPL-SCNC: 11.1 MMOL/L — CRITICAL HIGH (ref 0.5–2)
SURGICAL PATHOLOGY STUDY: SIGNIFICANT CHANGE UP

## 2018-12-27 PROCEDURE — 71045 X-RAY EXAM CHEST 1 VIEW: CPT | Mod: 26

## 2018-12-27 PROCEDURE — 99292 CRITICAL CARE ADDL 30 MIN: CPT

## 2018-12-27 PROCEDURE — 99291 CRITICAL CARE FIRST HOUR: CPT

## 2018-12-27 RX ADMIN — Medication 50 MILLIGRAM(S): at 05:24

## 2018-12-27 RX ADMIN — PANTOPRAZOLE SODIUM 40 MILLIGRAM(S): 20 TABLET, DELAYED RELEASE ORAL at 11:25

## 2018-12-27 RX ADMIN — PIPERACILLIN AND TAZOBACTAM 200 GRAM(S): 4; .5 INJECTION, POWDER, LYOPHILIZED, FOR SOLUTION INTRAVENOUS at 11:25

## 2018-12-27 RX ADMIN — VASOPRESSIN 0.6 UNIT(S)/MIN: 20 INJECTION INTRAVENOUS at 11:01

## 2018-12-27 RX ADMIN — Medication 500 MILLIGRAM(S): at 05:23

## 2018-12-27 RX ADMIN — PROPOFOL 1.81 MICROGRAM(S)/KG/MIN: 10 INJECTION, EMULSION INTRAVENOUS at 11:08

## 2018-12-27 RX ADMIN — Medication 5.43 MICROGRAM(S)/KG/MIN: at 10:02

## 2018-12-27 RX ADMIN — CHLORHEXIDINE GLUCONATE 1 APPLICATION(S): 213 SOLUTION TOPICAL at 08:00

## 2018-12-27 RX ADMIN — PIPERACILLIN AND TAZOBACTAM 200 GRAM(S): 4; .5 INJECTION, POWDER, LYOPHILIZED, FOR SOLUTION INTRAVENOUS at 05:23

## 2018-12-27 RX ADMIN — CHLORHEXIDINE GLUCONATE 15 MILLILITER(S): 213 SOLUTION TOPICAL at 08:00

## 2018-12-27 NOTE — PROGRESS NOTE ADULT - PROVIDER SPECIALTY LIST ADULT
CT Surgery
Critical Care
Infectious Disease
Nephrology
Neurology
Neurology
Pulmonology
Critical Care
Infectious Disease
Nephrology

## 2018-12-27 NOTE — DISCHARGE NOTE FOR THE EXPIRED PATIENT - HOSPITAL COURSE
This is a 78 year old female with history of asthma, HLD, GERD, SVT, MVP, osteoporosis, RUL lung nodule, necrotizing pna s/p biopsy showing aspergillosis who initially presented to [facility] with hemoptysis and cough, found to have necrotizing pneumonia with biopsy showing aspergillosis.  During her admission at that time, pulmonary and infectious disease teams were consulted and patient was treated with Amphotericin B for invasive aspergillosis, switched to Voriconazole.  She later became unstable at [facility] with troponin peak at 50 without acute ST changes on EKG but with acute intermittent episode of Afib converted to NSR after 1 dose of cardizem.  Patient was transferred to [facility] on 12/19/18 for further evaluation.  On arrival to CCU, arterial line placed with noted decompensation overnight into 12/20/18 requiring high doses of vasopressors and inotropes.  She was emergently intubated with bedside ECHO showing severe MR and acute papillary muscle rupture.  Dr. Vega, Cardiac surgery, consulted for surgical evaluation and was found on initial evaluation to be in acute cardiogenic shock with ABG 7.15/33/75/11 O2 sat 80% on Vasopressin 0.06, Norepinephrine 1.6mcg/kg/min, Dobutamine 5, and SBP 80mmHg, unresponsive to high dose pressors and lactate 6.5.  She was taken emergently to cath lab on 12/20 with cardiac cath showing non-obstructive coronaries, EF normal with 5+ MR and IABP was placed.  She was taken emergently to OR where she underwent sternotomy MVR.  Intraoperatively, she received 6u PRBC, 2 FFP, 1 PLT and was admitted to CTICU on levo/vaso/epi with lactate 7, with primacor added after arrival. On 12/21/18, CVVHD attempted but quickly aborted 2/2 hypotension, received 1u PRBC.  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  Significant anemia with hematemesis, 4u PRBC, 3 FFP, cryo, plt, and vitamin K given.  On 12/23, transaminitis, no GI bleed on imaging.  12/24, LFTs trending down, stable.  12/25/18, IABP removed, remained in critical condition.  On 12/26/18, patient requiring high dose phenylephrine and vasopressin with no improvement in hemodynamics despite aggressive support.  Family meeting held with intensivist and decision was made to refrain from escalation of care.  She remained with SBP 60s overnight on epi 0.06, vladimir 4, vaso 0.1.  On 12/27/18, hypotensive in AM on gtt.  Patient continued to decompensate with lactate 11.1 and ABG 7.13/40/94/13/-15.2 with evidence of multiorgan system failure with resultant cardiopulmonary arrest at 1545, pronounced dead by Dr. Ferro at that time.

## 2018-12-27 NOTE — PROGRESS NOTE ADULT - REASON FOR ADMISSION
NSTEMI
NSTEMI/Acute MR
NSTEMI

## 2018-12-27 NOTE — PROGRESS NOTE ADULT - SUBJECTIVE AND OBJECTIVE BOX
CTICU  CRITICAL  CARE  attending     Hand off received 					   Pertinent clinical, laboratory, radiographic, hemodynamic, echocardiographic, respiratory data, microbiologic data and chart were reviewed and analyzed frequently throughout the course of the day and night  Patient seen and examined with CTS/ SH attending at bedside  Pt is a 78y , Female, HEALTH ISSUES - PROBLEM Dx:  Sepsis: Sepsis  Cardiogenic shock: Cardiogenic shock  HOLA (acute kidney injury): HOLA (acute kidney injury)  Hemoptysis: Hemoptysis  Pneumonia: Pneumonia      , FAMILY HISTORY:  No pertinent family history in first degree relatives  PAST MEDICAL & SURGICAL HISTORY:  Asthma, unspecified asthma severity, unspecified whether complicated, unspecified whether persistent  High cholesterol  Hypertension    Patient is a 78y old  Female who presents with a chief complaint of NSTEMI (26 Dec 2018 13:36)      14 system review was unremarkable  acute changes include acute respiratory failure  Vital signs, hemodynamic and respiratory parameters were reviewed from the bedside nursing flowsheet.  ICU Vital Signs Last 24 Hrs  T(C): 35.2 (27 Dec 2018 05:01), Max: 35.3 (27 Dec 2018 01:01)  T(F): 95.4 (27 Dec 2018 05:01), Max: 95.5 (27 Dec 2018 01:01)  HR: 42 (27 Dec 2018 14:10) (38 - 120)  BP: --  BP(mean): --  ABP: 42/22 (27 Dec 2018 14:00) (42/22 - 90/56)  ABP(mean): 28 (27 Dec 2018 14:00) (28 - 68)  RR: 12 (27 Dec 2018 14:00) (12 - 12)  SpO2: 100% (27 Dec 2018 14:10) (96% - 100%)    Adult Advanced Hemodynamics Last 24 Hrs  CVP(mm Hg): 8 (27 Dec 2018 05:30) (5 - 17)  CVP(cm H2O): --  CO: --  CI: --  PA: --  PA(mean): --  PCWP: --  SVR: --  SVRI: --  PVR: --  PVRI: --, ABG - ( 27 Dec 2018 03:37 )  pH, Arterial: 7.13  pH, Blood: x     /  pCO2: 40    /  pO2: 94    / HCO3: 13    / Base Excess: -15.2 /  SaO2: 96                Mode: AC/ CMV (Assist Control/ Continuous Mandatory Ventilation)  RR (machine): 12  TV (machine): 450  FiO2: 100  PEEP: 5  ITime: 1  MAP: 9  PIP: 25    Intake and output was reviewed and the fluid balance was calculated  Daily     Daily   I&O's Summary    26 Dec 2018 07:01  -  27 Dec 2018 07:00  --------------------------------------------------------  IN: 2519.8 mL / OUT: 1387 mL / NET: 1132.8 mL    27 Dec 2018 07:01  -  27 Dec 2018 14:25  --------------------------------------------------------  IN: 485.2 mL / OUT: 455 mL / NET: 30.2 mL        All lines and drain sites were assessed  Glycemic trend was reviewedCAPILLARY BLOOD GLUCOSE        No acute change in mental status  Auscultation of the chest reveals equal bs  Abdomen is soft  Extremities are warm and well perfused  Wounds appear clean and unremarkable  Antibiotics are periop    labs  CBC Full  -  ( 26 Dec 2018 21:10 )  WBC Count : 35.3 K/uL  Hemoglobin : 11.0 g/dL  Hematocrit : 32.1 %  Platelet Count - Automated : 57 K/uL  Mean Cell Volume : 85.6 fL  Mean Cell Hemoglobin : 29.3 pg  Mean Cell Hemoglobin Concentration : 34.3 g/dL  Auto Neutrophil # : x  Auto Lymphocyte # : x  Auto Monocyte # : x  Auto Eosinophil # : x  Auto Basophil # : x  Auto Neutrophil % : x  Auto Lymphocyte % : x  Auto Monocyte % : x  Auto Eosinophil % : x  Auto Basophil % : x    12-26    135  |  96  |  38<H>  ----------------------------<  191<H>  4.3   |  16<L>  |  1.34<H>    Ca    8.9      26 Dec 2018 21:10  Phos  4.0     12-26  Mg     2.2     12-26    TPro  3.2<L>  /  Alb  2.2<L>  /  TBili  19.9<H>  /  DBili  x   /  AST  601<H>  /  ALT  194<H>  /  AlkPhos  249<H>  12-26    PT/INR - ( 26 Dec 2018 21:10 )   PT: 22.5 sec;   INR: 1.95          PTT - ( 26 Dec 2018 21:10 )  PTT:47.2 sec  The current medications were reviewed   MEDICATIONS  (STANDING):  ascorbic acid Syrup 500 milliGRAM(s) Oral two times a day  aspirin  chewable 81 milliGRAM(s) Oral daily  calcium gluconate IVPB 1 Gram(s) IV Intermittent every 6 hours  chlorhexidine 0.12% Liquid 15 milliLiter(s) Oral Mucosa two times a day  chlorhexidine 2% Cloths 1 Application(s) Topical <User Schedule>  cisatracurium Infusion 3 MICROgram(s)/kG/Min (10.854 mL/Hr) IV Continuous <Continuous>  dextrose 50% Injectable 50 milliLiter(s) IV Push every 15 minutes  DOBUTamine Infusion 3 MICROgram(s)/kG/Min (5.427 mL/Hr) IV Continuous <Continuous>  EPINEPHrine    Infusion 0.03 MICROgram(s)/kG/Min (3.392 mL/Hr) IV Continuous <Continuous>  hydrocortisone sodium succinate Injectable 50 milliGRAM(s) IV Push every 12 hours  insulin Infusion 2 Unit(s)/Hr (2 mL/Hr) IV Continuous <Continuous>  micafungin IVPB 100 milliGRAM(s) IV Intermittent every 24 hours  milrinone Infusion 0.125 MICROgram(s)/kG/Min (2.261 mL/Hr) IV Continuous <Continuous>  pantoprazole  Injectable 40 milliGRAM(s) IV Push daily  phenylephrine    Infusion 2 MICROgram(s)/kG/Min (22.613 mL/Hr) IV Continuous <Continuous>  piperacillin/tazobactam IVPB. 3.375 Gram(s) IV Intermittent every 6 hours  propofol Infusion 5 MICROgram(s)/kG/Min (1.809 mL/Hr) IV Continuous <Continuous>  sodium chloride 0.9%. 1000 milliLiter(s) (10 mL/Hr) IV Continuous <Continuous>  vancomycin  IVPB 1000 milliGRAM(s) IV Intermittent every 24 hours  vasopressin Infusion 0.01 Unit(s)/Min (0.6 mL/Hr) IV Continuous <Continuous>    MEDICATIONS  (PRN):  ALBUTerol/ipratropium for Nebulization 3 milliLiter(s) Nebulizer every 6 hours PRN Shortness of Breath and/or Wheezing       PROBLEM LIST/ ASSESSMENT:  HEALTH ISSUES - PROBLEM Dx:  Sepsis: Sepsis  Cardiogenic shock: Cardiogenic shock  HOLA (acute kidney injury): HOLA (acute kidney injury)  Hemoptysis: Hemoptysis  Pneumonia: Pneumonia      ,   Patient is a 78y old  Female who presents with a chief complaint of NSTEMI (26 Dec 2018 13:36)     s/p cardiac surgery      My plan includes :  close hemodynamic, ventilatory and drain monitoring and management per post op routine  d/w  - requests comfort care   Monitor for arrhythmias and monitor parameters for organ perfusion  monitor neurologic status  Head of the bed should remain elevated to 45 deg .   chest PT and IS will be encouraged  monitor adequacy of oxygenation and ventilation and attempt to wean oxygen  Nutritional goals will be met using po eventually , ensure adequate caloric intake and montior the same  Stress ulcer and VTE prophylaxis will be achieved    Glycemic control is satisfactory  Electrolytes have been repleted as necessary and wound care has been carried out. Pain control has been achieved.   agressive physical therapy and early mobility and ambulation goals will be met   The family was updated about the course and plan  CRITICAL CARE TIME SPENT in evaluation and management, reassessments, review and interpretation of labs and x-rays, ventilator and hemodynamic management, formulating a plan and coordinating care: ___90____ MIN.  Time does not include procedural time.  CTICU ATTENDING     					    Aguila Ferro MD

## 2018-12-28 PROCEDURE — 84484 ASSAY OF TROPONIN QUANT: CPT

## 2018-12-28 PROCEDURE — 85027 COMPLETE CBC AUTOMATED: CPT

## 2018-12-28 PROCEDURE — P9012: CPT

## 2018-12-28 PROCEDURE — 84443 ASSAY THYROID STIM HORMONE: CPT

## 2018-12-28 PROCEDURE — P9016: CPT

## 2018-12-28 PROCEDURE — C1894: CPT

## 2018-12-28 PROCEDURE — 85610 PROTHROMBIN TIME: CPT

## 2018-12-28 PROCEDURE — 82962 GLUCOSE BLOOD TEST: CPT

## 2018-12-28 PROCEDURE — 80202 ASSAY OF VANCOMYCIN: CPT

## 2018-12-28 PROCEDURE — 85018 HEMOGLOBIN: CPT

## 2018-12-28 PROCEDURE — 83036 HEMOGLOBIN GLYCOSYLATED A1C: CPT

## 2018-12-28 PROCEDURE — 93306 TTE W/DOPPLER COMPLETE: CPT

## 2018-12-28 PROCEDURE — 94002 VENT MGMT INPAT INIT DAY: CPT

## 2018-12-28 PROCEDURE — 80061 LIPID PANEL: CPT

## 2018-12-28 PROCEDURE — 82553 CREATINE MB FRACTION: CPT

## 2018-12-28 PROCEDURE — 84100 ASSAY OF PHOSPHORUS: CPT

## 2018-12-28 PROCEDURE — 93005 ELECTROCARDIOGRAM TRACING: CPT

## 2018-12-28 PROCEDURE — P9035: CPT

## 2018-12-28 PROCEDURE — 82803 BLOOD GASES ANY COMBINATION: CPT

## 2018-12-28 PROCEDURE — 86901 BLOOD TYPING SEROLOGIC RH(D): CPT

## 2018-12-28 PROCEDURE — 81001 URINALYSIS AUTO W/SCOPE: CPT

## 2018-12-28 PROCEDURE — 82550 ASSAY OF CK (CPK): CPT

## 2018-12-28 PROCEDURE — 86900 BLOOD TYPING SEROLOGIC ABO: CPT

## 2018-12-28 PROCEDURE — 84295 ASSAY OF SERUM SODIUM: CPT

## 2018-12-28 PROCEDURE — 83050 HGB METHEMOGLOBIN QUAN: CPT

## 2018-12-28 PROCEDURE — C1769: CPT

## 2018-12-28 PROCEDURE — 83605 ASSAY OF LACTIC ACID: CPT

## 2018-12-28 PROCEDURE — 85384 FIBRINOGEN ACTIVITY: CPT

## 2018-12-28 PROCEDURE — 84132 ASSAY OF SERUM POTASSIUM: CPT

## 2018-12-28 PROCEDURE — C1887: CPT

## 2018-12-28 PROCEDURE — 71045 X-RAY EXAM CHEST 1 VIEW: CPT

## 2018-12-28 PROCEDURE — 83735 ASSAY OF MAGNESIUM: CPT

## 2018-12-28 PROCEDURE — P9017: CPT

## 2018-12-28 PROCEDURE — 83615 LACTATE (LD) (LDH) ENZYME: CPT

## 2018-12-28 PROCEDURE — 80048 BASIC METABOLIC PNL TOTAL CA: CPT

## 2018-12-28 PROCEDURE — 36415 COLL VENOUS BLD VENIPUNCTURE: CPT

## 2018-12-28 PROCEDURE — 82330 ASSAY OF CALCIUM: CPT

## 2018-12-28 PROCEDURE — 88305 TISSUE EXAM BY PATHOLOGIST: CPT

## 2018-12-28 PROCEDURE — C1889: CPT

## 2018-12-28 PROCEDURE — 36430 TRANSFUSION BLD/BLD COMPNT: CPT

## 2018-12-28 PROCEDURE — 85379 FIBRIN DEGRADATION QUANT: CPT

## 2018-12-28 PROCEDURE — 80053 COMPREHEN METABOLIC PANEL: CPT

## 2018-12-28 PROCEDURE — 85730 THROMBOPLASTIN TIME PARTIAL: CPT

## 2018-12-28 PROCEDURE — 86880 COOMBS TEST DIRECT: CPT

## 2018-12-28 PROCEDURE — 85025 COMPLETE CBC W/AUTO DIFF WBC: CPT

## 2018-12-28 PROCEDURE — 86850 RBC ANTIBODY SCREEN: CPT

## 2018-12-28 PROCEDURE — 86606 ASPERGILLUS ANTIBODY: CPT

## 2018-12-28 PROCEDURE — 94640 AIRWAY INHALATION TREATMENT: CPT

## 2018-12-28 PROCEDURE — P9045: CPT

## 2018-12-28 PROCEDURE — 86923 COMPATIBILITY TEST ELECTRIC: CPT

## 2018-12-28 PROCEDURE — P9047: CPT

## 2018-12-28 PROCEDURE — 94003 VENT MGMT INPAT SUBQ DAY: CPT

## 2018-12-28 PROCEDURE — 80076 HEPATIC FUNCTION PANEL: CPT

## 2018-12-29 LAB
GAS PNL BLDMV: SIGNIFICANT CHANGE UP
O2 CT VFR BLDMV CALC: SIGNIFICANT CHANGE UP ML/DL

## 2019-01-02 LAB — O2 CT VFR BLDMV CALC: SIGNIFICANT CHANGE UP ML/DL

## 2019-01-03 DIAGNOSIS — R57.0 CARDIOGENIC SHOCK: ICD-10-CM

## 2019-01-03 DIAGNOSIS — I21.4 NON-ST ELEVATION (NSTEMI) MYOCARDIAL INFARCTION: ICD-10-CM

## 2019-01-03 DIAGNOSIS — M81.0 AGE-RELATED OSTEOPOROSIS WITHOUT CURRENT PATHOLOGICAL FRACTURE: ICD-10-CM

## 2019-01-03 DIAGNOSIS — I50.1 LEFT VENTRICULAR FAILURE, UNSPECIFIED: ICD-10-CM

## 2019-01-03 DIAGNOSIS — J85.0 GANGRENE AND NECROSIS OF LUNG: ICD-10-CM

## 2019-01-03 DIAGNOSIS — A41.9 SEPSIS, UNSPECIFIED ORGANISM: ICD-10-CM

## 2019-01-03 DIAGNOSIS — K21.9 GASTRO-ESOPHAGEAL REFLUX DISEASE WITHOUT ESOPHAGITIS: ICD-10-CM

## 2019-01-03 DIAGNOSIS — E78.5 HYPERLIPIDEMIA, UNSPECIFIED: ICD-10-CM

## 2019-01-03 DIAGNOSIS — I34.0 NONRHEUMATIC MITRAL (VALVE) INSUFFICIENCY: ICD-10-CM

## 2019-01-03 DIAGNOSIS — Z66 DO NOT RESUSCITATE: ICD-10-CM

## 2019-01-03 DIAGNOSIS — R57.9 SHOCK, UNSPECIFIED: ICD-10-CM

## 2019-01-03 DIAGNOSIS — R91.1 SOLITARY PULMONARY NODULE: ICD-10-CM

## 2019-01-03 DIAGNOSIS — J45.909 UNSPECIFIED ASTHMA, UNCOMPLICATED: ICD-10-CM

## 2019-01-07 LAB
CORTICOSTEROID BINDING GLOBULIN RESULT: 0.9 MG/DL — LOW
CORTIS F/TOTAL MFR SERPL: 98 % — SIGNIFICANT CHANGE UP
CORTIS SERPL-MCNC: 356 UG/DL — HIGH
CORTISOL, FREE RESULT: 350 UG/DL — HIGH

## 2020-07-21 NOTE — PROGRESS NOTE ADULT - SUBJECTIVE AND OBJECTIVE BOX
OK TO TREAT      Pt in for labs and follow up visit today per protocol with Dr. Nav Roberts and RN. This is C1 AC and Atezo/Placebo. Patient reports the following adverse events at this time: gr 1 constipation, gr 1 hot flashes, gr 1 insomnia, gr 1 sob. Vital signs are stable. Patient to go to infusion center after appointment to receive  [facility] [place] [place] by [facility] + Atezo/placebo. Next appt is scheduled for 8/4/20. CTICU  CRITICAL  CARE  attending     Hand off received 					   Pertinent clinical, laboratory, radiographic, hemodynamic, echocardiographic, respiratory data, microbiologic data and chart were reviewed and analyzed frequently throughout the course of the day and night  Patient seen and examined with CTS/ SH attending at bedside      77 yo F w/ HTN, asthma, HLD, GERD, SVT, MVP, osteoporosis, lung nodule (RUL, for 2 years), necrotizing PNA s/p biopsy showing aspergillosis transferred from [facility] w/ NSTEMI for cardiac cath. In october, pt had routine CXR for monitoring of a RUL lung nodule (that shes has had for two years), that showed increase in size. Pt s/p bronchoscopy 10/31 w/ negative biopsy and culture. Pt had repeat bronchoscopy 2 weeks ago at [facility] which revealed aspergillosis. She followed up w/ her outpatient  pulmonologist 12/13 because she was having hemoptysis and cough (filled 2 tissues with blood), who referred her to [facility] ED. Pt admitted for w/ "moderate hemoptysis" and CT chest showing cavitary lesion concerning for necrotizing PNA. ID consulted, Bcx neg, and pt was started on amphotericin B (bc voriconazole interacts w/ flecainide, so used while cleared her system) for invasive aspergillosis c/b HOLA, n/v. Switched to voriconazole when flecainide cleared her two days later, w/ improvement in Cr. On the night prior to transfer to [facility],, pt spiked fever of 102 with n/v. Bcx drawn, trops I were 50. Pt had no EKG changes, CP, or SOB. Started on coreg, lipitor, lovenox, loaded w/ ASA, plavix. S/p zosyn x1 am of admission for fever. Pt defervesced, hemodynamically stable, 1 episode of paroxysmal afib that resolved s/p diltiazem. Transferred to [facility] for cardiac cath in setting of NSTEMI. On arrival, pt afebrile, tachycardic, with BP in 100s.  Pt denied CP, SOB, fevers, chills, reports stable cough, no current hemoptysis. Denies pain, dysuria, urinary frequency change, diarrhea, cardiac history.   S/P MVR  Hospital course complicated by multiorgan failure.  Acute respiratory  failure  Encephalopathy probably  due to  toxic metabolic disorder,  GI bleeding  Hemodynamic instability  IABP support  methemoglobinemia  HOLA on CVVHD      HEALTH ISSUES - PROBLEM Dx:  Sepsis: Sepsis  Cardiogenic shock: Cardiogenic shock  HOLA (acute kidney injury): HOLA (acute kidney injury)  Hemoptysis: Hemoptysis  Pneumonia: Pneumonia      FAMILY HISTORY:  No pertinent family history in first degree relatives  PAST MEDICAL & SURGICAL HISTORY:  Asthma, unspecified asthma severity, unspecified whether complicated, unspecified whether persistent  High cholesterol  Hypertension    Patient is a 78y old  Female who presents to [facility] with NSTEMI         14 system review was unremarkable  acute changes include acute respiratory failure  Vital signs, hemodynamic and respiratory parameters were reviewed from the bedside nursing flow  sheet.  ICU Vital Signs Last 24 Hrs  T(C): 36.1 (24 Dec 2018 11:00), Max: 36.3 (24 Dec 2018 01:01)  T(F): 97 (24 Dec 2018 11:00), Max: 97.3 (24 Dec 2018 01:01)  HR: 80 (24 Dec 2018 20:00) (70 - 88)  BP: --  BP(mean): --  ABP: 116/66 (24 Dec 2018 20:00) (98/56 - 136/52)  ABP(mean): 84 (24 Dec 2018 20:00) (52 - 84)  RR: 12 (24 Dec 2018 20:00) (12 - 12)  SpO2: 96% (24 Dec 2018 20:00) (70% - 100%)    Adult Advanced Hemodynamics Last 24 Hrs  CVP(mm Hg): 17 (24 Dec 2018 20:00) (8 - 20)  CVP(cm H2O): --  CO: 3.5 (24 Dec 2018 11:00) (3.1 - 3.5)  CI: 2 (24 Dec 2018 11:00) (1.8 - 2)  PA: 40/23 (24 Dec 2018 20:00) (25/15 - 42/24)  PA(mean): 30 (24 Dec 2018 20:00) (20 - 31)  PCWP: --  SVR: 1278 (24 Dec 2018 11:00) (1278 - 1623)  SVRI: 2237 (24 Dec 2018 11:00) (2237 - 2796)  PVR: --  PVRI: --, ABG - ( 24 Dec 2018 17:20 )  pH, Arterial: 7.47  pH, Blood: x     /  pCO2: 32    /  pO2: 152   / HCO3: 23    / Base Excess: -0.5  /  SaO2: x                 Mode: AC/ CMV (Assist Control/ Continuous Mandatory Ventilation)  RR (machine): 12  TV (machine): 450  FiO2: 70  PEEP: 8  ITime: 1  MAP: 10  PIP: 22    Intake and output was reviewed and the fluid balance was calculated  Daily     Daily   I&O's Summary    23 Dec 2018 07:01  -  24 Dec 2018 07:00  --------------------------------------------------------  IN: 3209.9 mL / OUT: 6182 mL / NET: -2972.1 mL    24 Dec 2018 07:01  -  24 Dec 2018 21:01  --------------------------------------------------------  IN: 3012.4 mL / OUT: 1644 mL / NET: 1368.4 mL        All lines and drain sites were assessed  Glycemic trend was reviewed CAPILLARY BLOOD GLUCOSE      POCT Blood Glucose.: 136 mg/dL (24 Dec 2018 07:24)        NEURO: PUPILS 2-3 mm (Reactive). + Right Hemiparesis. + Encephalopathy.  CVS: S1, S2, No S3,  RESPIRATORY: Auscultation of the chest reveals equal breath sounds.  GI: Abdomen is soft. Distended. Hypoactive Bowel sounds. + Sacral Decubitis.   Extremities are warm and well perfused.  VASCULAR: + Distal pulses.  SKIN: +  ICTERUS. Multiple areas of Ecchymosis and skin break down.   Wounds appear clean and unremarkable  Antibiotics are Zosyn, Micafungin, vancomycin.        labs  CBC Full  -  ( 24 Dec 2018 15:49 )  WBC Count : 21.0 K/uL  Hemoglobin : 10.2 g/dL  Hematocrit : 28.8 %  Platelet Count - Automated : 30 K/uL  Mean Cell Volume : 80.0 fL  Mean Cell Hemoglobin : 28.3 pg  Mean Cell Hemoglobin Concentration : 35.4 g/dL  Auto Neutrophil # : x  Auto Lymphocyte # : x  Auto Monocyte # : x  Auto Eosinophil # : x  Auto Basophil # : x  Auto Neutrophil % : x  Auto Lymphocyte % : x  Auto Monocyte % : x  Auto Eosinophil % : x  Auto Basophil % : x    12-24    135  |  94<L>  |  23  ----------------------------<  146<H>  3.9   |  20<L>  |  0.85    Ca    9.4      24 Dec 2018 15:49  Phos  2.6     12-24  Mg     2.1     12-24    TPro  4.0<L>  /  Alb  3.1<L>  /  TBili  25.0<H>  /  DBili  x   /  AST  4749<H>  /  ALT  639<H>  /  AlkPhos  186<H>  12-24    PT/INR - ( 24 Dec 2018 15:49 )   PT: 18.2 sec;   INR: 1.59          PTT - ( 24 Dec 2018 15:49 )  PTT:35.9 sec  The current medications were reviewed   MEDICATIONS  (STANDING):  amiodarone Infusion 1 mG/Min (33.333 mL/Hr) IV Continuous <Continuous>  ascorbic acid Syrup 500 milliGRAM(s) Oral two times a day  atorvastatin 80 milliGRAM(s) Oral at bedtime  calcium gluconate IVPB 1 Gram(s) IV Intermittent every 6 hours  chlorhexidine 0.12% Liquid 15 milliLiter(s) Oral Mucosa two times a day  chlorhexidine 2% Cloths 1 Application(s) Topical <User Schedule>  CRRT Treatment    <Continuous>  dextrose 50% Injectable 50 milliLiter(s) IV Push every 15 minutes  DOBUTamine Infusion 3 MICROgram(s)/kG/Min (5.427 mL/Hr) IV Continuous <Continuous>  EPINEPHrine    Infusion 0.02 MICROgram(s)/kG/Min (4.522 mL/Hr) IV Continuous <Continuous>  hydrocortisone sodium succinate Injectable 75 milliGRAM(s) IV Push every 8 hours  insulin Infusion 2 Unit(s)/Hr (2 mL/Hr) IV Continuous <Continuous>  micafungin IVPB 100 milliGRAM(s) IV Intermittent every 24 hours  milrinone Infusion 0.125 MICROgram(s)/kG/Min (2.261 mL/Hr) IV Continuous <Continuous>  pantoprazole Infusion 8 mG/Hr (10 mL/Hr) IV Continuous <Continuous>  phenylephrine    Infusion 0.2 MICROgram(s)/kG/Min (4.522 mL/Hr) IV Continuous <Continuous>  piperacillin/tazobactam IVPB. 3.375 Gram(s) IV Intermittent every 6 hours  PureFlow Dialysate RFP-400 (K 2 / Ca 3) 5000 milliLiter(s) (1500 mL/Hr) CRRT <Continuous>  sodium chloride 0.9%. 1000 milliLiter(s) (10 mL/Hr) IV Continuous <Continuous>  vancomycin  IVPB 1000 milliGRAM(s) IV Intermittent every 24 hours  vasopressin Infusion 0.01 Unit(s)/Min (0.6 mL/Hr) IV Continuous <Continuous>    MEDICATIONS  (PRN):  ALBUTerol/ipratropium for Nebulization 3 milliLiter(s) Nebulizer every 6 hours PRN Shortness of Breath and/or Wheezing  fentaNYL    Injectable 12.5 MICROGram(s) IV Push every 3 hours PRN Severe Pain (7 - 10)      PROBLEM LIST/ ASSESSMENT:  HEALTH ISSUES - PROBLEM Dx:  Sepsis: Sepsis  Cardiogenic shock: Cardiogenic shock  HOLA (acute kidney injury): HOLA (acute kidney injury)  Hemoptysis: Hemoptysis  Pneumonia: Pneumonia        Patient is a 78y old  Female who presented to [facility] with NSTEMI   Transferred   to [facility] in cardiogenic shock.  S/P Emergent MVR   Hemodynamically  Stable.  Tolerating CVVHD  Good oxygenation.        My plan includes :  CT scan of head, neck, chest, pelvis.  Continue vent support and nutrition.  WEAN IABP.  IV  ANTIBIOTICS.  CVVHD.  Close hemodynamic, ventilatory and drain monitoring and management.  Monitor for arrhythmias and monitor parameters for organ perfusion  Monitor neurologic status.   Head of the bed should remain elevated to 45 deg .   Chest PT and IS will be encouraged  Monitor adequacy of oxygenation and ventilation and attempt to wean oxygen  Nutritional goals will be met using po eventually , ensure adequate caloric intake and monitor the same  Stress ulcer and VTE prophylaxis will be achieved    Glycemic control is satisfactory  Electrolytes have been repleted as necessary and wound care has been carried out. Pain control has been achieved.   Aggressive physical therapy and early mobility and ambulation goals will be met   The family was updated about the course and plan  CRITICAL CARE TIME SPENT in evaluation and management, reassessments, review and interpretation of labs and x-rays, ventilator and hemodynamic management, formulating a plan and coordinating care: ___60____ MIN.  Time does not include procedural time.    CTICU ATTENDING     					    Javier Heaton MD

## 2024-04-03 NOTE — PROGRESS NOTE ADULT - SUBJECTIVE AND OBJECTIVE BOX
Interval Events:  Patient seen and examined at bedside. The patient is unable to offer any subjective history given her clinical state. The patient is s/p open mitral valve repair yesterday, is currently intubated, on multiple pressors and inotropic agents and in the process of being initiated on CVVH. The patient is also S/P IABP placement.     MEDICATIONS:  Pulmonary:  ALBUTerol/ipratropium for Nebulization 3 milliLiter(s) Nebulizer every 6 hours PRN  buDESOnide 160 MICROgram(s)/formoterol 4.5 MICROgram(s) Inhaler 2 Puff(s) Inhalation two times a day  montelukast 10 milliGRAM(s) Oral daily    Antimicrobials:  piperacillin/tazobactam IVPB. 3.375 Gram(s) IV Intermittent every 6 hours  vancomycin  IVPB 1000 milliGRAM(s) IV Intermittent every 24 hours    Anticoagulants:    Cardiac:  amiodarone Infusion 1 mG/Min IV Continuous <Continuous>  amiodarone IVPB 150 milliGRAM(s) IV Intermittent once  DOBUTamine Infusion 3 MICROgram(s)/kG/Min IV Continuous <Continuous>  furosemide Infusion 20 mG/Hr IV Continuous <Continuous>  milrinone Infusion 0.25 MICROgram(s)/kG/Min IV Continuous <Continuous>  norepinephrine Infusion 0.05 MICROgram(s)/kG/Min IV Continuous <Continuous>    Endocrine:  atorvastatin 80 milliGRAM(s) Oral at bedtime  dextrose 50% Injectable 50 milliLiter(s) IV Push every 15 minutes  hydrocortisone sodium succinate Injectable 75 milliGRAM(s) IV Push every 8 hours  insulin Infusion 2 Unit(s)/Hr IV Continuous <Continuous>  vasopressin Infusion 0.01 Unit(s)/Min IV Continuous <Continuous>    Allergies    No Known Allergies    Intolerances        Vital Signs Last 24 Hrs  T(C): 35.9 (21 Dec 2018 08:00), Max: 36.5 (21 Dec 2018 04:00)  T(F): 96.6 (21 Dec 2018 08:00), Max: 97.7 (21 Dec 2018 04:00)  HR: 82 (21 Dec 2018 13:00) (60 - 108)  BP: --  BP(mean): 64 (20 Dec 2018 14:30) (64 - 64)  RR: 14 (21 Dec 2018 13:00) (14 - 16)  SpO2: 97% (21 Dec 2018 13:00) (68% - 100%)    General: Intubated and sedated on a mechanical ventilator  HEENT: No icterus,. Moist mucous membranes  Neck: JVD noted. Supple, no meningismus  Cardio: S1, S2 noted, RRR. No murmurs, rubs or gallops  Resp: Rales appreciated bilateral, No wheezes appreciated, No rhonchi.   Abdo: Soft, NT, bowel sounds present. No organomegaly  Extremities: No edema noted. Pulses present b/l  Neuro: AAO x3, grossly normal motor strength.  Lymphnodes: no lymphadenopathy identified.  Skin: Dry, no rashes     @ 07:  -   @ 07:00  --------------------------------------------------------  IN: 5505.7 mL / OUT: 2505 mL / NET: 3000.7 mL     @ 07:  -   @ 13:19  --------------------------------------------------------  IN: 609.6 mL / OUT: 486 mL / NET: 123.6 mL      Mode: AC/ CMV (Assist Control/ Continuous Mandatory Ventilation)  RR (machine): 14  TV (machine): 600  FiO2: 60  PEEP: 8  ITime: 1  MAP: 13  PIP: 28      LABS:  ABG - ( 21 Dec 2018 11:13 )  pH, Arterial: 7.50  pH, Blood: x     /  pCO2: 24    /  pO2: 94    / HCO3: 18    / Base Excess: -3.8  /  SaO2: 97                  CBC Full  -  ( 21 Dec 2018 11:15 )  WBC Count : 14.8 K/uL  Hemoglobin : 10.9 g/dL  Hematocrit : 32.1 %  Platelet Count - Automated : 68 K/uL  Mean Cell Volume : 78.1 fL  Mean Cell Hemoglobin : 26.5 pg  Mean Cell Hemoglobin Concentration : 34.0 g/dL  Auto Neutrophil # : x  Auto Lymphocyte # : x  Auto Monocyte # : x  Auto Eosinophil # : x  Auto Basophil # : x  Auto Neutrophil % : x  Auto Lymphocyte % : x  Auto Monocyte % : x  Auto Eosinophil % : x  Auto Basophil % : x        144  |  108  |  37<H>  ----------------------------<  131<H>  4.1   |  18<L>  |  2.13<H>    Ca    8.9      21 Dec 2018 11:15  Phos  4.4       Mg     2.1         TPro  4.2<L>  /  Alb  3.2<L>  /  TBili  4.5<H>  /  DBili  x   /  AST  >7000<H>  /  ALT  3873<H>  /  AlkPhos  111      PT/INR - ( 21 Dec 2018 11:15 )   PT: 29.1 sec;   INR: 2.50          PTT - ( 21 Dec 2018 11:15 )  PTT:37.4 sec      Urinalysis Basic - ( 20 Dec 2018 04:08 )    Color: Yellow / Appearance: Clear / S.020 / pH: x  Gluc: x / Ketone: NEGATIVE  / Bili: Negative / Urobili: 0.2 E.U./dL   Blood: x / Protein: NEGATIVE mg/dL / Nitrite: NEGATIVE   Leuk Esterase: NEGATIVE / RBC: < 5 /HPF / WBC < 5 /HPF   Sq Epi: x / Non Sq Epi: 0-5 /HPF / Bacteria: Present /HPF                RADIOLOGY & ADDITIONAL STUDIES (The following images were personally reviewed): normal

## 2025-02-14 NOTE — PROVIDER CONTACT NOTE (CRITICAL VALUE NOTIFICATION) - BACKGROUND
Noted to have bradycardia with heart rate in the 40s   Cardiology input appreciated   Please see also, goals of care discussion   Transitioned to comfort measures only 2/6/2025    NSTEMI, Papillary muscle rupture